# Patient Record
Sex: FEMALE | Race: BLACK OR AFRICAN AMERICAN | Employment: FULL TIME | URBAN - METROPOLITAN AREA
[De-identification: names, ages, dates, MRNs, and addresses within clinical notes are randomized per-mention and may not be internally consistent; named-entity substitution may affect disease eponyms.]

---

## 2022-04-13 ENCOUNTER — HOSPITAL ENCOUNTER (EMERGENCY)
Age: 67
Discharge: HOME | End: 2022-04-13
Attending: STUDENT IN AN ORGANIZED HEALTH CARE EDUCATION/TRAINING PROGRAM

## 2022-04-13 ENCOUNTER — APPOINTMENT (OUTPATIENT)
Dept: GENERAL RADIOLOGY | Age: 67
End: 2022-04-13
Attending: STUDENT IN AN ORGANIZED HEALTH CARE EDUCATION/TRAINING PROGRAM

## 2022-04-13 VITALS
TEMPERATURE: 98.1 F | HEART RATE: 73 BPM | DIASTOLIC BLOOD PRESSURE: 77 MMHG | OXYGEN SATURATION: 99 % | BODY MASS INDEX: 29.02 KG/M2 | RESPIRATION RATE: 27 BRPM | WEIGHT: 170 LBS | HEIGHT: 64 IN | SYSTOLIC BLOOD PRESSURE: 178 MMHG

## 2022-04-13 DIAGNOSIS — R53.83 FATIGUE, UNSPECIFIED TYPE: Primary | ICD-10-CM

## 2022-04-13 LAB
ALBUMIN SERPL-MCNC: 3.2 G/DL (ref 3.2–4.6)
ALBUMIN/GLOB SERPL: 1 {RATIO} (ref 1.2–3.5)
ALP SERPL-CCNC: 108 U/L (ref 50–136)
ALT SERPL-CCNC: 32 U/L (ref 12–65)
ANION GAP SERPL CALC-SCNC: 4 MMOL/L (ref 7–16)
APPEARANCE UR: ABNORMAL
AST SERPL-CCNC: 16 U/L (ref 15–37)
BASOPHILS # BLD: 0.1 K/UL (ref 0–0.2)
BASOPHILS NFR BLD: 1 % (ref 0–2)
BILIRUB SERPL-MCNC: 0.2 MG/DL (ref 0.2–1.1)
BILIRUB UR QL: NEGATIVE
BUN SERPL-MCNC: 17 MG/DL (ref 8–23)
CALCIUM SERPL-MCNC: 8.1 MG/DL (ref 8.3–10.4)
CHLORIDE SERPL-SCNC: 105 MMOL/L (ref 98–107)
CO2 SERPL-SCNC: 30 MMOL/L (ref 21–32)
COLOR UR: YELLOW
CREAT SERPL-MCNC: 0.99 MG/DL (ref 0.6–1)
DIFFERENTIAL METHOD BLD: ABNORMAL
EOSINOPHIL # BLD: 0.1 K/UL (ref 0–0.8)
EOSINOPHIL NFR BLD: 1 % (ref 0.5–7.8)
ERYTHROCYTE [DISTWIDTH] IN BLOOD BY AUTOMATED COUNT: 14.5 % (ref 11.9–14.6)
GLOBULIN SER CALC-MCNC: 3.2 G/DL (ref 2.3–3.5)
GLUCOSE SERPL-MCNC: 188 MG/DL (ref 65–100)
GLUCOSE UR STRIP.AUTO-MCNC: NEGATIVE MG/DL
HCT VFR BLD AUTO: 38.5 % (ref 35.8–46.3)
HGB BLD-MCNC: 12.4 G/DL (ref 11.7–15.4)
HGB UR QL STRIP: NEGATIVE
IMM GRANULOCYTES # BLD AUTO: 0.2 K/UL (ref 0–0.5)
IMM GRANULOCYTES NFR BLD AUTO: 1 % (ref 0–5)
KETONES UR QL STRIP.AUTO: ABNORMAL MG/DL
LEUKOCYTE ESTERASE UR QL STRIP.AUTO: NEGATIVE
LYMPHOCYTES # BLD: 3.9 K/UL (ref 0.5–4.6)
LYMPHOCYTES NFR BLD: 34 % (ref 13–44)
MAGNESIUM SERPL-MCNC: 1.9 MG/DL (ref 1.8–2.4)
MCH RBC QN AUTO: 30.7 PG (ref 26.1–32.9)
MCHC RBC AUTO-ENTMCNC: 32.2 G/DL (ref 31.4–35)
MCV RBC AUTO: 95.3 FL (ref 79.6–97.8)
MONOCYTES # BLD: 0.8 K/UL (ref 0.1–1.3)
MONOCYTES NFR BLD: 7 % (ref 4–12)
NEUTS SEG # BLD: 6.5 K/UL (ref 1.7–8.2)
NEUTS SEG NFR BLD: 56 % (ref 43–78)
NITRITE UR QL STRIP.AUTO: NEGATIVE
NRBC # BLD: 0 K/UL (ref 0–0.2)
PH UR STRIP: 5.5 [PH] (ref 5–9)
PLATELET # BLD AUTO: 247 K/UL (ref 150–450)
PMV BLD AUTO: 9.9 FL (ref 9.4–12.3)
POTASSIUM SERPL-SCNC: 3.8 MMOL/L (ref 3.5–5.1)
PROT SERPL-MCNC: 6.4 G/DL (ref 6.3–8.2)
PROT UR STRIP-MCNC: NEGATIVE MG/DL
RBC # BLD AUTO: 4.04 M/UL (ref 4.05–5.2)
SODIUM SERPL-SCNC: 139 MMOL/L (ref 136–145)
SP GR UR REFRACTOMETRY: 1.03 (ref 1–1.02)
TSH SERPL DL<=0.005 MIU/L-ACNC: 2.11 UIU/ML
UROBILINOGEN UR QL STRIP.AUTO: 0.2 EU/DL (ref 0.2–1)
WBC # BLD AUTO: 11.5 K/UL (ref 4.3–11.1)

## 2022-04-13 PROCEDURE — 81003 URINALYSIS AUTO W/O SCOPE: CPT

## 2022-04-13 PROCEDURE — 93005 ELECTROCARDIOGRAM TRACING: CPT | Performed by: STUDENT IN AN ORGANIZED HEALTH CARE EDUCATION/TRAINING PROGRAM

## 2022-04-13 PROCEDURE — 80053 COMPREHEN METABOLIC PANEL: CPT

## 2022-04-13 PROCEDURE — 99285 EMERGENCY DEPT VISIT HI MDM: CPT

## 2022-04-13 PROCEDURE — 74011250636 HC RX REV CODE- 250/636: Performed by: STUDENT IN AN ORGANIZED HEALTH CARE EDUCATION/TRAINING PROGRAM

## 2022-04-13 PROCEDURE — 83735 ASSAY OF MAGNESIUM: CPT

## 2022-04-13 PROCEDURE — 84443 ASSAY THYROID STIM HORMONE: CPT

## 2022-04-13 PROCEDURE — 85025 COMPLETE CBC W/AUTO DIFF WBC: CPT

## 2022-04-13 PROCEDURE — 71046 X-RAY EXAM CHEST 2 VIEWS: CPT

## 2022-04-13 RX ORDER — SODIUM CHLORIDE 0.9 % (FLUSH) 0.9 %
5-10 SYRINGE (ML) INJECTION AS NEEDED
Status: DISCONTINUED | OUTPATIENT
Start: 2022-04-13 | End: 2022-04-14 | Stop reason: HOSPADM

## 2022-04-13 RX ORDER — HYDROXYZINE PAMOATE 25 MG/1
25 CAPSULE ORAL
Qty: 20 CAPSULE | Refills: 0 | Status: SHIPPED | OUTPATIENT
Start: 2022-04-13

## 2022-04-13 RX ORDER — MELATONIN 3 MG
3 CAPSULE ORAL
Qty: 20 CAPSULE | Refills: 0 | Status: SHIPPED | OUTPATIENT
Start: 2022-04-13

## 2022-04-13 RX ORDER — SODIUM CHLORIDE 0.9 % (FLUSH) 0.9 %
5-10 SYRINGE (ML) INJECTION EVERY 8 HOURS
Status: DISCONTINUED | OUTPATIENT
Start: 2022-04-13 | End: 2022-04-14 | Stop reason: HOSPADM

## 2022-04-13 RX ADMIN — SODIUM CHLORIDE 1000 ML: 900 INJECTION, SOLUTION INTRAVENOUS at 21:44

## 2022-04-14 LAB
ATRIAL RATE: 58 BPM
CALCULATED P AXIS, ECG09: 76 DEGREES
CALCULATED R AXIS, ECG10: 61 DEGREES
CALCULATED T AXIS, ECG11: 115 DEGREES
DIAGNOSIS, 93000: NORMAL
P-R INTERVAL, ECG05: 134 MS
Q-T INTERVAL, ECG07: 438 MS
QRS DURATION, ECG06: 82 MS
QTC CALCULATION (BEZET), ECG08: 429 MS
VENTRICULAR RATE, ECG03: 58 BPM

## 2022-04-14 NOTE — DISCHARGE INSTRUCTIONS
Your work-up tonight has been stable. Take the medication prescribed as directed. Avoid caffeinated drinks after early afternoon to prevent disruption of sleep schedule. Go to sleep at the same time every night, awake at similar times each morning.   Arrange follow-up with your primary care provider for recheck and return to this department for worsening symptoms, concerns or questions

## 2022-04-14 NOTE — ED PROVIDER NOTES
62-year-old female patient presenting to this department with reports of worsening fatigue. Patient states this has been progressive over the past 2 weeks. She states she is up frequently throughout the night to urinate. History of overactive bladder, denies dysuria, hematuria or associated fever or chills. Patient states she often is exhausted by 730, goes to bed shortly thereafter and wakes around 10, at 1 AM and then 3 AM and finally 5 to go to work. Patient states she feels fatigued in the morning when waking. She denies any sudden changes in health but does endorse stress related to her job. Patient reports history of COPD and tobacco use. She denies previous sleep study. Voice concern for dehydration as her mouth has been dry but denies any changes in fluid intake           History reviewed. No pertinent past medical history. History reviewed. No pertinent surgical history. History reviewed. No pertinent family history. Social History     Socioeconomic History    Marital status:      Spouse name: Not on file    Number of children: Not on file    Years of education: Not on file    Highest education level: Not on file   Occupational History    Not on file   Tobacco Use    Smoking status: Never Smoker    Smokeless tobacco: Never Used   Substance and Sexual Activity    Alcohol use: Not on file    Drug use: Not on file    Sexual activity: Not on file   Other Topics Concern    Not on file   Social History Narrative    Not on file     Social Determinants of Health     Financial Resource Strain:     Difficulty of Paying Living Expenses: Not on file   Food Insecurity:     Worried About Running Out of Food in the Last Year: Not on file    [place] of Food in the Last Year: Not on file   Transportation Needs:     Lack of Transportation (Medical): Not on file    Lack of Transportation (Non-Medical):  Not on file   Physical Activity:     Days of Exercise per Week: Not on file    Minutes of Exercise per Session: Not on file   Stress:     Feeling of Stress : Not on file   Social Connections:     Frequency of Communication with Friends and Family: Not on file    Frequency of Social Gatherings with Friends and Family: Not on file    Attends [employer] Services: Not on file    Active Member of Clubs or Organizations: Not on file    Attends Club or Organization Meetings: Not on file    Marital Status: Not on file   Intimate Partner Violence:     Fear of Current or Ex-Partner: Not on file    Emotionally Abused: Not on file    Physically Abused: Not on file    Sexually Abused: Not on file   Housing Stability:     Unable to Pay for Housing in the Last Year: Not on file    Number of [place] in the Last Year: Not on file    Unstable Housing in the Last Year: Not on file         ALLERGIES: Patient has no known allergies. Review of Systems   Constitutional: Positive for fatigue. Negative for chills, diaphoresis and fever. HENT: Negative for congestion, sneezing and sore throat. Eyes: Negative for visual disturbance. Respiratory: Negative for cough, chest tightness, shortness of breath and wheezing. Cardiovascular: Negative for chest pain and leg swelling. Gastrointestinal: Negative for abdominal pain, blood in stool, diarrhea, nausea and vomiting. Endocrine: Negative for polyuria. Genitourinary: Negative for difficulty urinating, dysuria, flank pain, hematuria and urgency. Musculoskeletal: Negative for back pain, myalgias, neck pain and neck stiffness. Skin: Negative for color change and rash. Neurological: Negative for dizziness, syncope, speech difficulty, weakness, light-headedness, numbness and headaches. Psychiatric/Behavioral: Positive for sleep disturbance. Negative for behavioral problems. All other systems reviewed and are negative.       Vitals:    04/13/22 1814   BP: (!) 163/73   Pulse: 68   Resp: 16   Temp: 98.1 °F (36.7 °C)   SpO2: 98%   Weight: 77.1 kg (170 lb)   Height: 5' 4\" (1.626 m)            Physical Exam  Vitals and nursing note reviewed. Constitutional:       General: She is not in acute distress. Appearance: She is well-developed. She is not diaphoretic. Comments: Alert and oriented to person place and time. No acute distress, speaks in clear, fluid sentences. Very well-appearing female patient,   HENT:      Head: Normocephalic and atraumatic. Right Ear: External ear normal.      Left Ear: External ear normal.      Nose: Nose normal.   Eyes:      Pupils: Pupils are equal, round, and reactive to light. Cardiovascular:      Rate and Rhythm: Normal rate and regular rhythm. Heart sounds: Normal heart sounds. No murmur heard. No friction rub. No gallop. Pulmonary:      Effort: Pulmonary effort is normal. No respiratory distress. Breath sounds: Normal breath sounds. No stridor. No decreased breath sounds, wheezing, rhonchi or rales. Chest:      Chest wall: No tenderness. Abdominal:      General: There is no distension. Palpations: Abdomen is soft. There is no mass. Tenderness: There is no abdominal tenderness. There is no guarding or rebound. Hernia: No hernia is present. Musculoskeletal:         General: No tenderness or deformity. Normal range of motion. Cervical back: Normal range of motion. Skin:     General: Skin is warm and dry. Neurological:      Mental Status: She is alert and oriented to person, place, and time. Cranial Nerves: No cranial nerve deficit. MDM  Number of Diagnoses or Management Options  Fatigue, unspecified type: established and worsening  Diagnosis management comments: Labs and x-ray including TSH are stable.   Awaiting urinalysis    Anticipate discharge with outpatient supportive care and primary care referral       Amount and/or Complexity of Data Reviewed  Clinical lab tests: ordered and reviewed  Tests in the radiology section of CPT®: ordered and reviewed  Tests in the medicine section of CPT®: ordered and reviewed  Independent visualization of images, tracings, or specimens: yes    Risk of Complications, Morbidity, and/or Mortality  Presenting problems: moderate  Diagnostic procedures: low  Management options: moderate    Patient Progress  Patient progress: stable         Procedures

## 2022-04-14 NOTE — ED NOTES
I have reviewed discharge instructions with the patient. The patient verbalized understanding. Patient left ED via Discharge Method: ambulatory to Home with self    Opportunity for questions and clarification provided. No acute distress present      Patient given 2 scripts. To continue your aftercare when you leave the hospital, you may receive an automated call from our care team to check in on how you are doing. This is a free service and part of our promise to provide the best care and service to meet your aftercare needs.  If you have questions, or wish to unsubscribe from this service please call [phone]. Thank you for Choosing our [facility].

## 2023-02-21 ENCOUNTER — APPOINTMENT (OUTPATIENT)
Dept: CT IMAGING | Age: 68
End: 2023-02-21
Payer: MEDICARE

## 2023-02-21 ENCOUNTER — APPOINTMENT (OUTPATIENT)
Dept: GENERAL RADIOLOGY | Age: 68
End: 2023-02-21
Payer: MEDICARE

## 2023-02-21 ENCOUNTER — APPOINTMENT (OUTPATIENT)
Dept: ULTRASOUND IMAGING | Age: 68
End: 2023-02-21
Payer: MEDICARE

## 2023-02-21 ENCOUNTER — HOSPITAL ENCOUNTER (EMERGENCY)
Age: 68
Discharge: HOME | End: 2023-02-21
Attending: EMERGENCY MEDICINE
Payer: MEDICARE

## 2023-02-21 VITALS
HEART RATE: 72 BPM | SYSTOLIC BLOOD PRESSURE: 189 MMHG | TEMPERATURE: 98.9 F | HEIGHT: 64 IN | WEIGHT: 165 LBS | RESPIRATION RATE: 18 BRPM | DIASTOLIC BLOOD PRESSURE: 90 MMHG | BODY MASS INDEX: 28.17 KG/M2 | OXYGEN SATURATION: 99 %

## 2023-02-21 DIAGNOSIS — N39.0 URINARY TRACT INFECTION WITHOUT HEMATURIA, SITE UNSPECIFIED: ICD-10-CM

## 2023-02-21 DIAGNOSIS — R93.89 ENDOMETRIAL THICKENING ON ULTRASOUND: Primary | ICD-10-CM

## 2023-02-21 DIAGNOSIS — R10.9 ABDOMINAL PAIN, UNSPECIFIED ABDOMINAL LOCATION: ICD-10-CM

## 2023-02-21 DIAGNOSIS — M25.512 ACUTE PAIN OF LEFT SHOULDER: ICD-10-CM

## 2023-02-21 LAB
ALBUMIN SERPL-MCNC: 4 G/DL (ref 3.2–4.6)
ALBUMIN/GLOB SERPL: 1.3 (ref 0.4–1.6)
ALP SERPL-CCNC: 86 U/L (ref 50–130)
ALT SERPL-CCNC: 27 U/L (ref 12–65)
ANION GAP SERPL CALC-SCNC: 5 MMOL/L (ref 2–11)
APPEARANCE UR: ABNORMAL
AST SERPL-CCNC: 21 U/L (ref 15–37)
BACTERIA URNS QL MICRO: ABNORMAL /HPF
BILIRUB SERPL-MCNC: 0.4 MG/DL (ref 0.2–1.1)
BILIRUB UR QL: NEGATIVE
BUN SERPL-MCNC: 19 MG/DL (ref 8–23)
CALCIUM SERPL-MCNC: 9 MG/DL (ref 8.3–10.4)
CASTS URNS QL MICRO: ABNORMAL /LPF
CHLORIDE SERPL-SCNC: 108 MMOL/L (ref 101–110)
CO2 SERPL-SCNC: 29 MMOL/L (ref 21–32)
COLOR UR: ABNORMAL
CREAT SERPL-MCNC: 0.95 MG/DL (ref 0.6–1)
EPI CELLS #/AREA URNS HPF: ABNORMAL /HPF
ERYTHROCYTE [DISTWIDTH] IN BLOOD BY AUTOMATED COUNT: 14 % (ref 11.9–14.6)
GLOBULIN SER CALC-MCNC: 3.2 G/DL (ref 2.8–4.5)
GLUCOSE SERPL-MCNC: 101 MG/DL (ref 65–100)
GLUCOSE UR STRIP.AUTO-MCNC: NEGATIVE MG/DL
HCT VFR BLD AUTO: 40.1 % (ref 35.8–46.3)
HGB BLD-MCNC: 13.2 G/DL (ref 11.7–15.4)
HGB UR QL STRIP: NEGATIVE
KETONES UR QL STRIP.AUTO: ABNORMAL MG/DL
LEUKOCYTE ESTERASE UR QL STRIP.AUTO: NEGATIVE
LIPASE SERPL-CCNC: 64 U/L (ref 73–393)
MAGNESIUM SERPL-MCNC: 2.2 MG/DL (ref 1.8–2.4)
MCH RBC QN AUTO: 31.1 PG (ref 26.1–32.9)
MCHC RBC AUTO-ENTMCNC: 32.9 G/DL (ref 31.4–35)
MCV RBC AUTO: 94.4 FL (ref 82–102)
MUCOUS THREADS URNS QL MICRO: ABNORMAL /LPF
NITRITE UR QL STRIP.AUTO: NEGATIVE
NRBC # BLD: 0 K/UL (ref 0–0.2)
OTHER OBSERVATIONS: ABNORMAL
PH UR STRIP: 5 (ref 5–9)
PLATELET # BLD AUTO: 274 K/UL (ref 150–450)
PMV BLD AUTO: 9.6 FL (ref 9.4–12.3)
POTASSIUM SERPL-SCNC: 3.5 MMOL/L (ref 3.5–5.1)
PROT SERPL-MCNC: 7.2 G/DL (ref 6.3–8.2)
PROT UR STRIP-MCNC: ABNORMAL MG/DL
RBC # BLD AUTO: 4.25 M/UL (ref 4.05–5.2)
RBC #/AREA URNS HPF: ABNORMAL /HPF
SODIUM SERPL-SCNC: 142 MMOL/L (ref 133–143)
SP GR UR REFRACTOMETRY: 1.03 (ref 1–1.02)
TROPONIN I SERPL HS-MCNC: 40.6 PG/ML (ref 0–14)
TROPONIN I SERPL HS-MCNC: 42.8 PG/ML (ref 0–14)
UROBILINOGEN UR QL STRIP.AUTO: 1 EU/DL (ref 0.2–1)
WBC # BLD AUTO: 9.1 K/UL (ref 4.3–11.1)
WBC URNS QL MICRO: ABNORMAL /HPF

## 2023-02-21 PROCEDURE — 99285 EMERGENCY DEPT VISIT HI MDM: CPT

## 2023-02-21 PROCEDURE — 83690 ASSAY OF LIPASE: CPT

## 2023-02-21 PROCEDURE — 94762 N-INVAS EAR/PLS OXIMTRY CONT: CPT

## 2023-02-21 PROCEDURE — A4216 STERILE WATER/SALINE, 10 ML: HCPCS | Performed by: PHYSICIAN ASSISTANT

## 2023-02-21 PROCEDURE — 85027 COMPLETE CBC AUTOMATED: CPT

## 2023-02-21 PROCEDURE — 83735 ASSAY OF MAGNESIUM: CPT

## 2023-02-21 PROCEDURE — 80053 COMPREHEN METABOLIC PANEL: CPT

## 2023-02-21 PROCEDURE — 2580000003 HC RX 258: Performed by: PHYSICIAN ASSISTANT

## 2023-02-21 PROCEDURE — 96365 THER/PROPH/DIAG IV INF INIT: CPT

## 2023-02-21 PROCEDURE — 93005 ELECTROCARDIOGRAM TRACING: CPT | Performed by: PHYSICIAN ASSISTANT

## 2023-02-21 PROCEDURE — 2500000003 HC RX 250 WO HCPCS: Performed by: PHYSICIAN ASSISTANT

## 2023-02-21 PROCEDURE — 73030 X-RAY EXAM OF SHOULDER: CPT

## 2023-02-21 PROCEDURE — 71046 X-RAY EXAM CHEST 2 VIEWS: CPT

## 2023-02-21 PROCEDURE — 76856 US EXAM PELVIC COMPLETE: CPT

## 2023-02-21 PROCEDURE — 6360000002 HC RX W HCPCS: Performed by: PHYSICIAN ASSISTANT

## 2023-02-21 PROCEDURE — 74177 CT ABD & PELVIS W/CONTRAST: CPT

## 2023-02-21 PROCEDURE — 96375 TX/PRO/DX INJ NEW DRUG ADDON: CPT

## 2023-02-21 PROCEDURE — 72040 X-RAY EXAM NECK SPINE 2-3 VW: CPT

## 2023-02-21 PROCEDURE — 6360000004 HC RX CONTRAST MEDICATION: Performed by: PHYSICIAN ASSISTANT

## 2023-02-21 PROCEDURE — 87086 URINE CULTURE/COLONY COUNT: CPT

## 2023-02-21 PROCEDURE — 81001 URINALYSIS AUTO W/SCOPE: CPT

## 2023-02-21 PROCEDURE — 84484 ASSAY OF TROPONIN QUANT: CPT

## 2023-02-21 RX ORDER — MELOXICAM 7.5 MG/1
7.5 TABLET ORAL DAILY
Qty: 30 TABLET | Refills: 0 | Status: SHIPPED | OUTPATIENT
Start: 2023-02-21

## 2023-02-21 RX ORDER — ONDANSETRON 2 MG/ML
4 INJECTION INTRAMUSCULAR; INTRAVENOUS
Status: COMPLETED | OUTPATIENT
Start: 2023-02-21 | End: 2023-02-21

## 2023-02-21 RX ORDER — METHYLPREDNISOLONE SODIUM SUCCINATE 125 MG/2ML
125 INJECTION, POWDER, LYOPHILIZED, FOR SOLUTION INTRAMUSCULAR; INTRAVENOUS
Status: COMPLETED | OUTPATIENT
Start: 2023-02-21 | End: 2023-02-21

## 2023-02-21 RX ORDER — SODIUM CHLORIDE 0.9 % (FLUSH) 0.9 %
10 SYRINGE (ML) INJECTION
Status: COMPLETED | OUTPATIENT
Start: 2023-02-21 | End: 2023-02-21

## 2023-02-21 RX ORDER — 0.9 % SODIUM CHLORIDE 0.9 %
100 INTRAVENOUS SOLUTION INTRAVENOUS
Status: COMPLETED | OUTPATIENT
Start: 2023-02-21 | End: 2023-02-21

## 2023-02-21 RX ORDER — METHYLPREDNISOLONE 4 MG/1
4 TABLET ORAL SEE ADMIN INSTRUCTIONS
Qty: 1 KIT | Refills: 0 | Status: SHIPPED | OUTPATIENT
Start: 2023-02-21

## 2023-02-21 RX ORDER — CEFDINIR 300 MG/1
300 CAPSULE ORAL 2 TIMES DAILY
Qty: 20 CAPSULE | Refills: 0 | Status: SHIPPED | OUTPATIENT
Start: 2023-02-21 | End: 2023-03-03

## 2023-02-21 RX ORDER — DIPHENHYDRAMINE HYDROCHLORIDE 50 MG/ML
25 INJECTION INTRAMUSCULAR; INTRAVENOUS
Status: COMPLETED | OUTPATIENT
Start: 2023-02-21 | End: 2023-02-21

## 2023-02-21 RX ORDER — FEXOFENADINE HCL 180 MG/1
180 TABLET ORAL DAILY
Qty: 30 TABLET | Refills: 0 | Status: SHIPPED | OUTPATIENT
Start: 2023-02-21 | End: 2023-03-23

## 2023-02-21 RX ORDER — 0.9 % SODIUM CHLORIDE 0.9 %
1000 INTRAVENOUS SOLUTION INTRAVENOUS
Status: COMPLETED | OUTPATIENT
Start: 2023-02-21 | End: 2023-02-21

## 2023-02-21 RX ADMIN — FAMOTIDINE 20 MG: 10 INJECTION, SOLUTION INTRAVENOUS at 18:12

## 2023-02-21 RX ADMIN — DIPHENHYDRAMINE HYDROCHLORIDE 25 MG: 50 INJECTION, SOLUTION INTRAMUSCULAR; INTRAVENOUS at 18:11

## 2023-02-21 RX ADMIN — CEFTRIAXONE 1000 MG: 1 INJECTION, POWDER, FOR SOLUTION INTRAMUSCULAR; INTRAVENOUS at 18:20

## 2023-02-21 RX ADMIN — SODIUM CHLORIDE, PRESERVATIVE FREE 10 ML: 5 INJECTION INTRAVENOUS at 17:19

## 2023-02-21 RX ADMIN — ONDANSETRON 4 MG: 2 INJECTION INTRAMUSCULAR; INTRAVENOUS at 18:13

## 2023-02-21 RX ADMIN — SODIUM CHLORIDE 100 ML: 9 INJECTION, SOLUTION INTRAVENOUS at 17:19

## 2023-02-21 RX ADMIN — SODIUM CHLORIDE 1000 ML: 9 INJECTION, SOLUTION INTRAVENOUS at 18:11

## 2023-02-21 RX ADMIN — METHYLPREDNISOLONE SODIUM SUCCINATE 125 MG: 125 INJECTION, POWDER, FOR SOLUTION INTRAMUSCULAR; INTRAVENOUS at 18:14

## 2023-02-21 RX ADMIN — IOPAMIDOL 100 ML: 755 INJECTION, SOLUTION INTRAVENOUS at 17:15

## 2023-02-21 ASSESSMENT — LIFESTYLE VARIABLES
HOW MANY STANDARD DRINKS CONTAINING ALCOHOL DO YOU HAVE ON A TYPICAL DAY: PATIENT DOES NOT DRINK
HOW OFTEN DO YOU HAVE A DRINK CONTAINING ALCOHOL: NEVER

## 2023-02-21 ASSESSMENT — PAIN DESCRIPTION - ORIENTATION: ORIENTATION: LEFT

## 2023-02-21 ASSESSMENT — PAIN - FUNCTIONAL ASSESSMENT
PAIN_FUNCTIONAL_ASSESSMENT: 0-10
PAIN_FUNCTIONAL_ASSESSMENT: PREVENTS OR INTERFERES SOME ACTIVE ACTIVITIES AND ADLS

## 2023-02-21 ASSESSMENT — PAIN DESCRIPTION - LOCATION: LOCATION: SHOULDER

## 2023-02-21 ASSESSMENT — PAIN DESCRIPTION - DESCRIPTORS: DESCRIPTORS: ACHING

## 2023-02-21 ASSESSMENT — PAIN SCALES - GENERAL: PAINLEVEL_OUTOF10: 10

## 2023-02-21 ASSESSMENT — PAIN DESCRIPTION - FREQUENCY: FREQUENCY: CONTINUOUS

## 2023-02-21 NOTE — Clinical Note
Lucita Martinez was seen and treated in our emergency department on 2/21/2023. She may return to work on 02/23/2023. If you have any questions or concerns, please don't hesitate to call.       ASHLYN Fontenot

## 2023-02-21 NOTE — ED PROVIDER NOTES
Emergency Department Provider Note                   PCP:                KARINE Jeffries NP               Age: 74 y.o. Sex: female       ICD-10-CM    1. Endometrial thickening on ultrasound  R93.89 [facility] Marah Lima MD, OB/GYN, [place]      2. Abdominal pain, unspecified abdominal location  R10.9 [facility] Marah Lima MD, OB/GYN, [place]      3. Urinary tract infection without hematuria, site unspecified  N39.0       4. Acute pain of left shoulder  M25.512 [address] [place] [address] Dr Dick Reed To Discharge 02/21/2023 09:02:10 PM       Medical Decision Making  6:02 PM patient has some hives proximal to her IV site from receiving the IV contrast.  She has no history of this. There is no shortness of breath or difficulty swallowing. No chest pain. She has some nausea with it. No itching. IV Benadryl, Pepcid, Solu-Medrol and fluids were ordered as well as Zofran for the nausea. IV Rocephin was ordered for her UTI however that will be held until these other medications are given and she is feeling better. She had bacteria in her urine. Pelvic ultrasound was ordered due to the thickening of her endometrium on CT. Patient is resting comfortably in the room. 7:11 PM the hives are completely gone. She is feeling better. IV contrast dye added to her allergy list.  No anaphylaxis. She will be sent home on Medrol dose pack, fexofenadine, Omnicef for her urinary tract infection, meloxicam for her pain and referral to Dr. Rufus Jaramillo who is the next unassigned OB/GYN. Patient was instructed to call them tomorrow to be scheduled as soon as possible. She will need an endometrial biopsy. She expresses understanding. She was referred to orthopedics for her shoulder if this continues to bother her. She should do gentle range of motion exercises.   Patient expresses understanding and is stable for discharge and ambulatory out of the ED without difficulty at this time. I also made a referral to PCP for follow-up. Keep a check on BP, check it once or twice weekly with same blood pressure monitor, write the number down with date and time and take that with you to see a PCP for recheck of BP. Patient presented with acute UTI, abdominal pain, shoulder pain to the ED. History obtained from patient. I reviewed the following tests labs, XR, CT. I did review records from an external source. I did engage in shared decision making with the patient/family. I considered further evaluation but did not do it due to resolved symptoms. There is no social determinant of health affecting care. I did use a clinical guideline to determine care of the patient. I discussed results/disposition with patient. Problems Addressed:  Abdominal pain, unspecified abdominal location: acute illness or injury  Acute pain of left shoulder: acute illness or injury  Endometrial thickening on ultrasound: acute illness or injury  Urinary tract infection without hematuria, site unspecified: acute illness or injury    Amount and/or Complexity of Data Reviewed  Labs: ordered. Radiology: ordered. ECG/medicine tests: ordered. Risk  OTC drugs. Prescription drug management.                                 Orders Placed This Encounter   Procedures    Culture, Urine    XR CHEST (2 VW)    CT ABDOMEN PELVIS W IV CONTRAST Additional Contrast? None    XR CERVICAL SPINE (2-3 VIEWS)    XR SHOULDER LEFT (MIN 2 VIEWS)    US PELVIS COMPLETE    CBC    Comprehensive Metabolic Panel    Troponin    Lipase    Magnesium    Urinalysis w rflx microscopic    [facility] Weinberg MD, OB/GYN, [address]    Cardiac Monitor    Pulse Oximetry    EKG 12 Lead    Saline lock IV        Medications   sodium chloride flush 0.9 % injection 10 mL (10 mLs IntraVENous Given 2/21/23 6705)   iopamidol (ISOVUE-370) 76 % injection 100 mL (100 mLs IntraVENous Given 2/21/23 1715)   0.9 % sodium chloride bolus (0 mLs IntraVENous Stopped 2/21/23 1720)   cefTRIAXone (ROCEPHIN) 1,000 mg in sodium chloride 0.9 % 50 mL IVPB (mini-bag) (0 mg IntraVENous Stopped 2/21/23 1850)   diphenhydrAMINE (BENADRYL) injection 25 mg (25 mg IntraVENous Given 2/21/23 1811)   famotidine (PEPCID) 20 mg in sodium chloride (PF) 0.9 % 10 mL injection (20 mg IntraVENous Given 2/21/23 1812)   methylPREDNISolone sodium (SOLU-MEDROL) injection 125 mg (125 mg IntraVENous Given 2/21/23 1814)   ondansetron (ZOFRAN) injection 4 mg (4 mg IntraVENous Given 2/21/23 1813)   0.9 % sodium chloride bolus (0 mLs IntraVENous Stopped 2/21/23 1911)       There are no discharge medications for this patient. Meryl Peoples is a 74 y.o. female who presents to the Emergency Department with chief complaint of    Chief Complaint   Patient presents with    Shoulder Pain    Constipation      Patient is here with abdominal pain and bloating that radiates up into her chest.  This has been going off and on for \"a while. \" She does not have a primary care physician. She states she has a history of constipation and nothing over-the-counter works. She has no fever. She has not been sick recently. She does have pain in the left shoulder that runs into her left upper arm but no swelling to that arm or weakness. No trouble speaking. No injury to the area. There is no swelling/tingling or weakness to her arms or legs, trouble with urination other than frequency or other new symptoms. She was concerned as she had a foreign body stuck in her vagina from May until January of this year without knowledge of it. This was removed without difficulty by [facility] on January 24. The object was intact. She had no injury from it however she has had symptoms off and on since then and is concerned she may have some repercussions from having the object in her vagina for such a long time.   She did ambulate to the room without difficulty and is well hydrated. The history is provided by the patient. Chest Pain  Pain location:  Epigastric  Pain quality: dull    Pain quality: not aching, not burning, not crushing, not hot, no pressure, not radiating, not sharp, not shooting, not stabbing, not tearing, not throbbing and no tightness    Pain radiates to:  Does not radiate  Pain severity:  Mild  Duration:  10 months  Timing:  Intermittent  Progression:  Waxing and waning  Context: not breathing, not drug use, not eating, not intercourse, not lifting, not movement, not raising an arm, not at rest, not stress and not trauma    Relieved by:  Nothing  Worsened by:  Nothing  Ineffective treatments:  None tried  Associated symptoms: abdominal pain and nausea    Associated symptoms: no AICD problem, no altered mental status, no anorexia, no anxiety, no back pain, no claudication, no cough, no diaphoresis, no dizziness, no dysphagia, no fatigue, no fever, no headache, no heartburn, no lower extremity edema, no near-syncope, no numbness, no orthopnea, no palpitations, no PND, no shortness of breath, no syncope, no vomiting and no weakness    Abdominal pain:     Location:  Generalized    Quality: cramping      Duration:  10 months    Timing:  Constant     Review of Systems   Constitutional: Negative. Negative for diaphoresis, fatigue and fever. HENT: Negative. Negative for trouble swallowing. Eyes: Negative. Respiratory: Negative. Negative for cough and shortness of breath. Cardiovascular:  Positive for chest pain. Negative for palpitations, orthopnea, claudication, syncope, PND and near-syncope. Gastrointestinal:  Positive for abdominal pain and nausea. Negative for anorexia, heartburn and vomiting. Endocrine: Negative. Genitourinary: Negative. Musculoskeletal: Negative. Negative for back pain. Left shoulder pain     Skin: Negative. Allergic/Immunologic: Negative. Neurological: Negative.   Negative for dizziness, weakness, numbness and headaches. Hematological: Negative. Psychiatric/Behavioral: Negative. All other systems reviewed and are negative. No past medical history on file. No past surgical history on file. No family history on file. Social History     Socioeconomic History    Marital status:    Tobacco Use    Smoking status: Never    Smokeless tobacco: Never        Allergies: Contrast [iodides]    There are no discharge medications for this patient. Vitals signs and nursing note reviewed. Patient Vitals for the past 4 hrs:   Pulse Resp BP SpO2   02/21/23 2121 72 18 -- 99 %   02/21/23 2114 62 12 (!) 189/90 99 %          Physical Exam  Vitals and nursing note reviewed. Constitutional:       Appearance: Normal appearance. HENT:      Head: Normocephalic and atraumatic. Right Ear: External ear normal.      Left Ear: External ear normal.      Nose: Nose normal.      Mouth/Throat:      Mouth: Mucous membranes are moist.   Eyes:      Extraocular Movements: Extraocular movements intact. Conjunctiva/sclera: Conjunctivae normal.      Pupils: Pupils are equal, round, and reactive to light. Cardiovascular:      Rate and Rhythm: Normal rate and regular rhythm. Pulses: Normal pulses. Heart sounds: Normal heart sounds. Pulmonary:      Effort: Pulmonary effort is normal.      Breath sounds: Normal breath sounds. Abdominal:      General: Abdomen is flat. Palpations: Abdomen is soft. Musculoskeletal:         General: Normal range of motion. Arms:       Cervical back: Normal range of motion and neck supple. Comments: Area of pain, there is full range of motion of the left shoulder and she is distally neurovascularly intact. Mild tenderness to palpation over the paracervical muscles on the left, palpation of that area reproduces her pain. Skin:     General: Skin is warm. Capillary Refill: Capillary refill takes less than 2 seconds. Neurological:      General: No focal deficit present. Mental Status: She is alert and oriented to person, place, and time. Mental status is at baseline. GCS: GCS eye subscore is 4. GCS verbal subscore is 5. GCS motor subscore is 6. Cranial Nerves: Cranial nerves 2-12 are intact. Sensory: Sensation is intact. Motor: Motor function is intact. Coordination: Coordination is intact. Gait: Gait is intact. Psychiatric:         Mood and Affect: Mood normal.         Behavior: Behavior normal.         Thought Content: Thought content normal.         Judgment: Judgment normal.        Procedures    ED EKG Interpretation  EKG was interpreted in the absence of a cardiologist.    Rate: 61 bpm.  EKG Interpretation: EKG Interpretation: sinus rhythm  ST Segments: Nonspecific ST segments - NO STEMI    Is this patient to be included in the SEP-1 core measure due to severe sepsis or septic shock? No Exclusion criteria - the patient is NOT to be included for SEP-1 Core Measure due to: Infection is not suspected     Results for orders placed or performed during the hospital encounter of 02/21/23   XR CHEST (2 VW)    Narrative    EXAMINATION: XR CHEST (2 VW) 2/21/2023 4:46 PM    ACCESSION NUMBER: [id number]    COMPARISON: None available    INDICATION: Chest Pain    TECHNIQUE: PA and lateral views of the chest were obtained. FINDINGS:   Support Devices:   *  None    Cardiac Silhouette: Within normal limits in size. Mediastinum: Normal mediastinal contours. Aortic arch calcifications. Lungs: No airspace consolidation. No pneumothorax or sizable pleural effusion. Upper Abdomen: Normal     Miscellaneous: No fracture or suspicious osseous lesion. Impression    No acute cardiopulmonary abnormality.      CT ABDOMEN PELVIS W IV CONTRAST Additional Contrast? None    Narrative    EXAMINATION: CT SCAN OF THE ABDOMEN AND PELVIS WITH INTRAVENOUS CONTRAST    DATE OF EXAM: 2/21/2023 4:30 PM    HISTORY: Abdominal pain and bloating  patient states she recently had a metal   object removed from the vagina. Patient is concerned about infection. COMPARISON: None. TECHNIQUE: CT examination of the abdomen and pelvis was performed following the   intravenous administration of iodinated contrast. 100 mL Isovue-370 CT dose   lowering techniques were used, to include: automated exposure control,   adjustment for patient size, and/or use of iterative reconstruction. FINDINGS:    ABDOMEN/PELVIS:    Lower Chest: Normal.     Liver: Normal.     Gallbladder/Biliary: Normal.     Pancreas: Normal.     Spleen: Normal.     Adrenal Glands: Normal.     Kidneys: Normal.     GI Tract: Stomach is nondistended. Small bowel is nondilated. Appendix is   visualized and normal. Large bowel shows no wall thickening and no significant   constipation. Mesentery/Peritoneum: Normal.    Vasculature: Significant atherosclerotic plaque abdominal aorta. No aneurysm. Lymph Nodes: Normal.     Abdominal Wall: Normal.     Bladder: Normal.     Reproductive: Limited assessment of the endometrium with CT but on the sagittal   view appears to measure up to 1.1 cm. This is abnormal based on patient age. No   radiopaque foreign body detected. No adnexal mass. Musculoskeletal: Normal.        Impression    1. Endometrial thickening. Recommend pelvis ultrasound for further assessment. 2.  No radiopaque foreign body. Thank you for the referral of this patient. This exam was interpreted by an   American Board of Radiology certified radiologist with subspecialty fellowship   in [place] [address]. If there are questions about this or other reports you can   contact a radiologist directly at [phone]   You can also reach me directly   at [email or web address]         Sharifa Hernandez M.D., [facility]   2/21/2023 5:43:00 PM   XR CERVICAL SPINE (2-3 VIEWS)    Narrative    EXAMINATION: XR CERVICAL SPINE (2-3 VIEWS) 2/21/2023 4:46 PM    ACCESSION NUMBER: [id number]    COMPARISON: None available    INDICATION: Neck and shoulder/arm pain    TECHNIQUE: 3 views of the cervical spine were obtained. FINDINGS:  Vertebral body height is within normal limits. Cervical spine alignment is  normal. No acute fracture. Mild to moderate multilevel degenerative changes involving the disc spaces and  facet joints. Arterial calcifications within the midneck. Impression    1. No acute abnormality. 2.  Mild to moderate multilevel degenerative changes. .   XR SHOULDER LEFT (MIN 2 VIEWS)    Narrative    EXAMINATION: XR SHOULDER LEFT (MIN 2 VIEWS) 2/21/2023 4:46 PM    ACCESSION NUMBER: [id number]    COMPARISON: None available    INDICATION: Neck and shoulder/arm pain    TECHNIQUE: 3 views of the left shoulder were obtained. FINDINGS:  No acute fracture or dislocation. Mild glenohumeral and AC joint arthrosis. Bony  mineralization is preserved. The soft tissues are unremarkable. Impression    1. No acute osseous abnormality. 2.  Mild glenohumeral and AC joint arthrosis. US PELVIS COMPLETE    Narrative    Exam: Pelvic ultrasound    Date: February 21, 2023    History: Endometrial thickening    Comparison: February 21, 2023    Technique: Real-time Duplex scan was performed using B-mode/grayscale imaging,   color flow and spectral Doppler analysis to include evaluation of arterial and   venous flow. Transabdominal and transvaginal technique was utilized. Findings: The uterus measures 7.2 x 3.7 x 3.1 cm. The endometrium is abnormally thickened   in a patient of this age measuring 1.5 cm. There is a probable 1.8 cm fibroid in   the anterior myometrium. The ovaries are not well-visualized secondary to overlying bowel gas. Impression    1. Abnormally thickened endometrium measuring 1.5 cm. OB/GYN consultation is   strongly recommended for follow-up. 2. Approximately 1.8 cm fibroid in the anterior myometrium.   3. The study is significantly degraded by overlying bowel gas.       Slot 63             Elo Landry M.D.   2/21/2023 8:42:00 PM   CBC   Result Value Ref Range    WBC 9.1 4.3 - 11.1 K/uL    RBC 4.25 4.05 - 5.2 M/uL    Hemoglobin 13.2 11.7 - 15.4 g/dL    Hematocrit 40.1 35.8 - 46.3 %    MCV 94.4 82.0 - 102.0 FL    MCH 31.1 26.1 - 32.9 PG    MCHC 32.9 31.4 - 35.0 g/dL    RDW 14.0 11.9 - 14.6 %    Platelets 295 231 - 710 K/uL    MPV 9.6 9.4 - 12.3 FL    nRBC 0.00 0.0 - 0.2 K/uL   Comprehensive Metabolic Panel   Result Value Ref Range    Sodium 142 133 - 143 mmol/L    Potassium 3.5 3.5 - 5.1 mmol/L    Chloride 108 101 - 110 mmol/L    CO2 29 21 - 32 mmol/L    Anion Gap 5 2 - 11 mmol/L    Glucose 101 (H) 65 - 100 mg/dL    BUN 19 8 - 23 MG/DL    Creatinine 0.95 0.6 - 1.0 MG/DL    Est, Glom Filt Rate >60 >60 ml/min/1.73m2    Calcium 9.0 8.3 - 10.4 MG/DL    Total Bilirubin 0.4 0.2 - 1.1 MG/DL    ALT 27 12 - 65 U/L    AST 21 15 - 37 U/L    Alk Phosphatase 86 50 - 130 U/L    Total Protein 7.2 6.3 - 8.2 g/dL    Albumin 4.0 3.2 - 4.6 g/dL    Globulin 3.2 2.8 - 4.5 g/dL    Albumin/Globulin Ratio 1.3 0.4 - 1.6     Troponin   Result Value Ref Range    Troponin, High Sensitivity 40.6 (H) 0 - 14 pg/mL   Troponin   Result Value Ref Range    Troponin, High Sensitivity 42.8 (H) 0 - 14 pg/mL   Lipase   Result Value Ref Range    Lipase 64 (L) 73 - 393 U/L   Magnesium   Result Value Ref Range    Magnesium 2.2 1.8 - 2.4 mg/dL   Urinalysis w rflx microscopic   Result Value Ref Range    Color, UA DARK YELLOW      Appearance CLOUDY      Specific Gravity, UA 1.034 (H) 1.001 - 1.023      pH, Urine 5.0 5.0 - 9.0      Protein, UA TRACE (A) NEG mg/dL    Glucose, UA Negative mg/dL    Ketones, Urine TRACE (A) NEG mg/dL    Bilirubin Urine Negative NEG      Blood, Urine Negative NEG      Urobilinogen, Urine 1.0 0.2 - 1.0 EU/dL    Nitrite, Urine Negative NEG      Leukocyte Esterase, Urine Negative NEG      WBC, UA 5-10 0 /hpf    RBC, UA 0-3 0 /hpf    Epithelial Cells UA 5-10 0 /hpf    BACTERIA, URINE 4+ (H) 0 /hpf    Casts 0-3 0 /lpf    Mucus, UA 1+ (H) 0 /lpf    Other observations RESULTS VERIFIED MANUALLY     EKG 12 Lead   Result Value Ref Range    Ventricular Rate 61 BPM    Atrial Rate 61 BPM    P-R Interval 140 ms    QRS Duration 89 ms    Q-T Interval 431 ms    QTc Calculation (Bazett) 435 ms    P Axis 64 degrees    R Axis 35 degrees    T Axis 66 degrees    Diagnosis Sinus rhythm         US PELVIS COMPLETE   Final Result   1. Abnormally thickened endometrium measuring 1.5 cm. OB/GYN consultation is    strongly recommended for follow-up. 2. Approximately 1.8 cm fibroid in the anterior myometrium. 3. The study is significantly degraded by overlying bowel gas. Zabrina Schroeder M.D.    2/21/2023 8:42:00 PM      CT ABDOMEN PELVIS W IV CONTRAST Additional Contrast? None   Final Result      1. Endometrial thickening. Recommend pelvis ultrasound for further assessment. 2.  No radiopaque foreign body. Thank you for the referral of this patient. This exam was interpreted by an    American Board of Radiology certified radiologist with subspecialty fellowship    in [place] [address]. If there are questions about this or other reports you can    contact a radiologist directly at [phone]   You can also reach me directly    at [email or web address]             Jose Enrique Baires M.D., [facility]    2/21/2023 5:43:00 PM      XR CHEST (2 VW)   Final Result   No acute cardiopulmonary abnormality. XR CERVICAL SPINE (2-3 VIEWS)   Final Result   1. No acute abnormality. 2.  Mild to moderate multilevel degenerative changes. .      XR SHOULDER LEFT (MIN 2 VIEWS)   Final Result   1. No acute osseous abnormality. 2.  Mild glenohumeral and AC joint arthrosis. Voice dictation software was used during the making of this note. This software is not perfect and grammatical and other typographical errors may be present.   This note has not been completely proofread for errors.      Fidel Galavizma  02/22/23 7114

## 2023-02-21 NOTE — ED TRIAGE NOTES
Pt c/o being constipated for the past couple weeks with no relief after taking miralx and drinking prune juice. Stated she had an x ray 01/23/2023 which showed a metal object inside her vagina which they removed, patient is concerned for an infection. Denies any discharge, flank pain or burning on urinating, was not placed on any antibiotics. Admits to frequency.    Also c/o left shoulder pain with numbness radiating down arm, denies any injury to shoulder

## 2023-02-22 LAB
EKG ATRIAL RATE: 61 BPM
EKG DIAGNOSIS: NORMAL
EKG P AXIS: 64 DEGREES
EKG P-R INTERVAL: 140 MS
EKG Q-T INTERVAL: 431 MS
EKG QRS DURATION: 89 MS
EKG QTC CALCULATION (BAZETT): 435 MS
EKG R AXIS: 35 DEGREES
EKG T AXIS: 66 DEGREES
EKG VENTRICULAR RATE: 61 BPM

## 2023-02-22 ASSESSMENT — ENCOUNTER SYMPTOMS
ORTHOPNEA: 0
VOMITING: 0
BACK PAIN: 0
SHORTNESS OF BREATH: 0
EYES NEGATIVE: 1
HEARTBURN: 0
RESPIRATORY NEGATIVE: 1
ABDOMINAL PAIN: 1
TROUBLE SWALLOWING: 0
NAUSEA: 1
ALLERGIC/IMMUNOLOGIC NEGATIVE: 1
COUGH: 0

## 2023-02-22 NOTE — ED NOTES
I have reviewed discharge instructions with the patient. The patient verbalized understanding. Patient left ED via Discharge Method: ambulatory to Home with self. Opportunity for questions and clarification provided. Patient given 4 scripts.         Burgess Aniket RN  02/21/23 [id number]

## 2023-02-22 NOTE — DISCHARGE INSTRUCTIONS
Call the gynecologist for an appointment as soon as possible. You will need an endometrial biopsy. Referral made to primary care and orthopedics. Take the medication as directed. I added IV contrast dye as an allergy on your allergy list.  Never take this again as you did have an allergic reaction to it. I will send you with a steroid dose taper as well as an allergy medicine that you should finish. Return to the ED if you are worsening in any way.

## 2023-02-23 ENCOUNTER — OFFICE VISIT (OUTPATIENT)
Dept: OBGYN CLINIC | Age: 68
End: 2023-02-23
Payer: MEDICARE

## 2023-02-23 VITALS
DIASTOLIC BLOOD PRESSURE: 80 MMHG | SYSTOLIC BLOOD PRESSURE: 140 MMHG | WEIGHT: 163 LBS | BODY MASS INDEX: 28.88 KG/M2 | HEIGHT: 63 IN

## 2023-02-23 DIAGNOSIS — K92.1 BLACK STOOL: ICD-10-CM

## 2023-02-23 DIAGNOSIS — N88.2 CERVICAL STENOSIS (UTERINE CERVIX): ICD-10-CM

## 2023-02-23 DIAGNOSIS — Z91.89 AT HIGH RISK FOR PAIN FROM PROCEDURE: ICD-10-CM

## 2023-02-23 DIAGNOSIS — N89.8 VAGINAL DISCHARGE: ICD-10-CM

## 2023-02-23 DIAGNOSIS — Z12.4 SCREENING FOR CERVICAL CANCER: ICD-10-CM

## 2023-02-23 DIAGNOSIS — R93.89 ENDOMETRIAL THICKENING ON ULTRASOUND: Primary | ICD-10-CM

## 2023-02-23 DIAGNOSIS — N89.8 VAGINAL ODOR: ICD-10-CM

## 2023-02-23 PROCEDURE — 4004F PT TOBACCO SCREEN RCVD TLK: CPT | Performed by: OBSTETRICS & GYNECOLOGY

## 2023-02-23 PROCEDURE — G8417 CALC BMI ABV UP PARAM F/U: HCPCS | Performed by: OBSTETRICS & GYNECOLOGY

## 2023-02-23 PROCEDURE — 3017F COLORECTAL CA SCREEN DOC REV: CPT | Performed by: OBSTETRICS & GYNECOLOGY

## 2023-02-23 PROCEDURE — 1090F PRES/ABSN URINE INCON ASSESS: CPT | Performed by: OBSTETRICS & GYNECOLOGY

## 2023-02-23 PROCEDURE — G8484 FLU IMMUNIZE NO ADMIN: HCPCS | Performed by: OBSTETRICS & GYNECOLOGY

## 2023-02-23 PROCEDURE — G8400 PT W/DXA NO RESULTS DOC: HCPCS | Performed by: OBSTETRICS & GYNECOLOGY

## 2023-02-23 PROCEDURE — G8427 DOCREV CUR MEDS BY ELIG CLIN: HCPCS | Performed by: OBSTETRICS & GYNECOLOGY

## 2023-02-23 PROCEDURE — 99203 OFFICE O/P NEW LOW 30 MIN: CPT | Performed by: OBSTETRICS & GYNECOLOGY

## 2023-02-23 PROCEDURE — 1123F ACP DISCUSS/DSCN MKR DOCD: CPT | Performed by: OBSTETRICS & GYNECOLOGY

## 2023-02-23 RX ORDER — NAPROXEN 500 MG/1
500 TABLET ORAL ONCE
Qty: 1 TABLET | Refills: 0 | Status: SHIPPED | OUTPATIENT
Start: 2023-02-23 | End: 2023-02-23

## 2023-02-23 RX ORDER — HYDROCODONE BITARTRATE AND ACETAMINOPHEN 7.5; 325 MG/1; MG/1
1 TABLET ORAL ONCE
Qty: 1 TABLET | Refills: 0 | Status: SHIPPED | OUTPATIENT
Start: 2023-02-23 | End: 2023-02-23

## 2023-02-23 RX ORDER — MISOPROSTOL 200 UG/1
TABLET ORAL
Qty: 3 TABLET | Refills: 0 | Status: SHIPPED | OUTPATIENT
Start: 2023-02-23

## 2023-02-23 NOTE — PROGRESS NOTES
Ed Clarke  74 y.o.  1955  [id number]    New pt           *    Today:  2/23/23      Chief Complaint   Patient presents with    [facility] FU       Subjective:  Ed Clarke is a 74 y.o. Amenorrhea since menopause ins her 45s  CT incidental finding --> endometrial thickening. No VB    4/2022 SF ER:  fatigue, frequent nocturia  \"Patient states she often is exhausted by 730, goes to bed shortly thereafter and wakes around 10, at 1 AM and then 3 AM and finally 5 to go to work. Patient states she feels fatigued in the morning when waking. Patient reports history of COPD and tobacco use  SF US--> thickened endometrium    2/2023 SF ER:  acute UTI, abd'l pain, shoulder pain          HPI:      CT Result (most recent):  CT ABDOMEN PELVIS W IV CONTRAST 02/21/2023    Narrative  EXAMINATION: CT SCAN OF THE ABDOMEN AND PELVIS WITH INTRAVENOUS CONTRAST    DATE OF EXAM: 2/21/2023 4:30 PM    HISTORY: Abdominal pain and bloating  patient states she recently had a metal  object removed from the vagina. Patient is concerned about infection. COMPARISON: None. TECHNIQUE: CT examination of the abdomen and pelvis was performed following the  intravenous administration of iodinated contrast. 100 mL Isovue-370 CT dose  lowering techniques were used, to include: automated exposure control,  adjustment for patient size, and/or use of iterative reconstruction. FINDINGS:    ABDOMEN/PELVIS:    Lower Chest: Normal.    Liver: Normal.    Gallbladder/Biliary: Normal.    Pancreas: Normal.    Spleen: Normal.    Adrenal Glands: Normal.    Kidneys: Normal.    GI Tract: Stomach is nondistended. Small bowel is nondilated. Appendix is  visualized and normal. Large bowel shows no wall thickening and no significant  constipation. Mesentery/Peritoneum: Normal.    Vasculature: Significant atherosclerotic plaque abdominal aorta. No aneurysm.     Lymph Nodes: Normal.    Abdominal Wall: Normal.    Bladder: Normal.    Reproductive: Limited assessment of the endometrium with CT but on the sagittal  view appears to measure up to 1.1 cm. This is abnormal based on patient age. No  radiopaque foreign body detected. No adnexal mass. Musculoskeletal: Normal.    Impression  1. Endometrial thickening. Recommend pelvis ultrasound for further assessment. 2.  No radiopaque foreign body. Thank you for the referral of this patient. This exam was interpreted by an  American Board of Radiology certified radiologist with subspecialty fellowship  in [place] [address]. If there are questions about this or other reports you can  contact a radiologist directly at [phone]   You can also reach me directly  at [email or web address]        Sunni Carias M.D., [facility]  2/21/2023 5:43:00 PM      US Result (most recent):  US PELVIS COMPLETE 02/21/2023    Narrative  Exam: Pelvic ultrasound    Date: February 21, 2023    History: Endometrial thickening    Comparison: February 21, 2023    Technique: Real-time Duplex scan was performed using B-mode/grayscale imaging,  color flow and spectral Doppler analysis to include evaluation of arterial and  venous flow. Transabdominal and transvaginal technique was utilized. Findings: The uterus measures 7.2 x 3.7 x 3.1 cm. The endometrium is abnormally thickened  in a patient of this age measuring 1.5 cm. There is a probable 1.8 cm fibroid in  the anterior myometrium. The ovaries are not well-visualized secondary to overlying bowel gas. Impression  1. Abnormally thickened endometrium measuring 1.5 cm. OB/GYN consultation is  strongly recommended for follow-up. 2. Approximately 1.8 cm fibroid in the anterior myometrium. 3. The study is significantly degraded by overlying bowel gas. Cindy Dougherty M.D.  2/21/2023 8:42:00 PM         No LMP recorded (lmp unknown). Patient is postmenopausal.      OB History   No obstetric history on file.        Allergies   Allergen Reactions Contrast [Iodides] Hives       Current Outpatient Medications   Medication Sig    miSOPROStol (CYTOTEC) 200 MCG tablet Take 200 mcg p.o. at bedtime starting 3 nights before your follow-up GYN office visit for endometrial biopsy. naproxen (EC-NAPROSYN) 500 MG EC tablet Take 1 tablet by mouth once for 1 dose Take 1-2 hours before endometrial biopsy    meloxicam (MOBIC) 7.5 MG tablet Take 1 tablet by mouth daily As directed for pain with food    methylPREDNISolone (MEDROL, KARY,) 4 MG tablet Take 1 tablet by mouth See Admin Instructions Take by mouth. fexofenadine (ALLEGRA) 180 MG tablet Take 1 tablet by mouth daily    albuterol sulfate HFA (PROVENTIL;VENTOLIN;PROAIR) 108 (90 Base) MCG/ACT inhaler INHALE 2 PUFFS EVERY 4 TO 6 HOURS AS NEEDED    atorvastatin (LIPITOR) 10 MG tablet Take 10 mg by mouth    benzonatate (TESSALON) 100 MG capsule Take 100 mg by mouth 3 times daily as needed    carvedilol (COREG) 12.5 MG tablet TAKE 1 TABLET BY MOUTH TWICE A DAY    lisinopril (PRINIVIL;ZESTRIL) 40 MG tablet   0 Refill(s), Type: Maintenance    magnesium oxide (MAG-OX) 400 MG tablet magnesium oxide 400 mg (241.3 mg magnesium) tablet   Take 1 tablet twice a day by oral route. Melatonin 3 MG CAPS Take 3 mg by mouth    busPIRone (BUSPAR) 10 MG tablet buspirone 10 mg tablet   1 tablet twice day as needed for anxiety    diclofenac (VOLTAREN) 75 MG EC tablet TAKE 1 TAB BY MOUTH TWICE A DAY X14 DAYS AS NEEDED FOR PAIN, SWELLING WITH FOOD. NOT W/ OTHER NSAIDS    FLUoxetine (PROZAC) 20 MG capsule Prozac 20 mg capsule   take 2 pills daily in the morning    zolpidem (AMBIEN) 10 MG tablet zolpidem 10 mg tablet   1/2 to one tablet qhs PRN     No current facility-administered medications for this visit. No past medical history on file.     Past Surgical History:   Procedure Laterality Date     SECTION         Social History     Socioeconomic History    Marital status:    Tobacco Use    Smoking status: Never Smokeless tobacco: Never   Vaping Use    Vaping Use: Never used   Substance and Sexual Activity    Alcohol use: Yes     Comment: occ    Drug use: Never    Sexual activity: Not Currently     Partners: Male       Family History   Problem Relation Age of Onset    Stroke Mother     Pseudotumor Cerebri Child     Hypertension Son         Both sons       Review of Systems   Genitourinary:         Nocturia       Objective: BP (!) 140/80   Ht 5' 3\" (1.6 m)   Wt 163 lb (73.9 kg)   LMP  (LMP Unknown)   BMI 28.87 kg/m²   Cayden Hernandez is a 74 y.o. female who appears pleasant, well developed, well nourished, with good attention to hygiene and body habitus. In no acute distress. Psych:  Oriented to person, place and time. Mood and affect are normal and appropriate to the situation. Short-term memory and understanding intact    Eyes: Sclera are clear. Conjunctiva and lids within normal limits. No icterus. Ears and Nose: no gross deformities to visual inspection, gross hearing intact     Lymph Nodes:    No groin lymphadenopathy noted. Skin: No skin rash, subcutaneous nodules, lesions or ulcers observed  Respiratory:   Breathing is non-labored. Lungs clear to auscultation without wheezing or rhonchi   Cardiovascular: RRR, Heart auscultation reveals no murmurs, rubs or gallops appreciated. Abdomen: Soft. No masses, nontender, no guarding or rebound  External genitalia: normal appearing, minimal erythema, no lesions  Vagina: pink with normal rugations, no lesions  Cervix: normal appearing, no lesions,  no exudate, no CMT.   Pap  Uterus: midline, normal size, shape and contour  Adnexa: bilaterally  no masses, non tender  Rectovaginal exam:  Normal sphincter tone, no masses     A/P:    1.  73 yo incidental finding endometrial thickening--> EMB      Significant comorbidities:        F/u 3m-assess response to tx    Re ROS--> non gynecologic concerns referred back to her PCP or appropriate specialist.            ICD-10-CM 1. Endometrial thickening on ultrasound  R93.89 PAP IG, Aptima HPV and rfx 16/18,45 ([id number])     miSOPROStol (CYTOTEC) 200 MCG tablet     HYDROcodone-acetaminophen (NORCO) 7.5-325 MG per tablet     naproxen (EC-NAPROSYN) 500 MG EC tablet      2. Screening for cervical cancer  Z12.4 PAP IG, Aptima HPV and rfx 16/18,45 ([id number])     PAP IG, Aptima HPV and rfx 16/18,45 ([id number])      3. Vaginal discharge  N89.8 NuSwab Vaginitis Plus (VG+) with Canddia (Six Species)     NuSwab Vaginitis Plus (VG+) with Canddia (Six Species)      4. Vaginal odor  N89.8 NuSwab Vaginitis Plus (VG+) with Canddia (Six Species)     NuSwab Vaginitis Plus (VG+) with Canddia (Six Species)      5. Black stool  K92.1 AMB POC FECAL BLOOD, OCCULT, QL 1 CARD      6. At high risk for pain from procedure  Z91.89 HYDROcodone-acetaminophen (NORCO) 7.5-325 MG per tablet     naproxen (EC-NAPROSYN) 500 MG EC tablet      7. Cervical stenosis (uterine cervix)  N88.2 PAP IG, Aptima HPV and rfx 16/18,45 ([id number])     miSOPROStol (CYTOTEC) 200 MCG tablet           Orders Placed This Encounter   Procedures    NuSwab Vaginitis Plus (VG+) with Canddia (Six Species)    PAP IG, Aptima HPV and rfx 16/18,45 ([id number])    AMB POC FECAL BLOOD, OCCULT, QL 1 CARD       More than 50% of this 30+  minute visit was spent addressing the above patient concerns including:  assessment, extensive chart review, physical exam and counseling the patient about the above concerns including evaluation plan and treatment strategies. Long conversation with patient, all her questions answered and addressed all her concerns.     Lexii Souza MD, [facility]

## 2023-02-24 ENCOUNTER — OFFICE VISIT (OUTPATIENT)
Dept: ORTHOPEDIC SURGERY | Age: 68
End: 2023-02-24
Payer: MEDICARE

## 2023-02-24 VITALS — WEIGHT: 163.8 LBS | BODY MASS INDEX: 27.29 KG/M2 | HEIGHT: 65 IN

## 2023-02-24 DIAGNOSIS — G95.9 CERVICAL MYELOPATHY (HCC): ICD-10-CM

## 2023-02-24 DIAGNOSIS — M48.02 CERVICAL STENOSIS OF SPINAL CANAL: Primary | ICD-10-CM

## 2023-02-24 DIAGNOSIS — R29.898 LEFT ARM WEAKNESS: ICD-10-CM

## 2023-02-24 LAB
BACTERIA SPEC CULT: NORMAL
SERVICE CMNT-IMP: NORMAL

## 2023-02-24 PROCEDURE — 99203 OFFICE O/P NEW LOW 30 MIN: CPT | Performed by: NURSE PRACTITIONER

## 2023-02-24 PROCEDURE — G8417 CALC BMI ABV UP PARAM F/U: HCPCS | Performed by: NURSE PRACTITIONER

## 2023-02-24 PROCEDURE — G8400 PT W/DXA NO RESULTS DOC: HCPCS | Performed by: NURSE PRACTITIONER

## 2023-02-24 PROCEDURE — 3017F COLORECTAL CA SCREEN DOC REV: CPT | Performed by: NURSE PRACTITIONER

## 2023-02-24 PROCEDURE — G8428 CUR MEDS NOT DOCUMENT: HCPCS | Performed by: NURSE PRACTITIONER

## 2023-02-24 PROCEDURE — 4004F PT TOBACCO SCREEN RCVD TLK: CPT | Performed by: NURSE PRACTITIONER

## 2023-02-24 PROCEDURE — 1090F PRES/ABSN URINE INCON ASSESS: CPT | Performed by: NURSE PRACTITIONER

## 2023-02-24 PROCEDURE — G8484 FLU IMMUNIZE NO ADMIN: HCPCS | Performed by: NURSE PRACTITIONER

## 2023-02-24 PROCEDURE — 1123F ACP DISCUSS/DSCN MKR DOCD: CPT | Performed by: NURSE PRACTITIONER

## 2023-02-24 RX ORDER — FLUOXETINE HYDROCHLORIDE 20 MG/1
CAPSULE ORAL
COMMUNITY
Start: 2022-11-10

## 2023-02-24 RX ORDER — MAGNESIUM OXIDE 400 MG/1
TABLET ORAL
COMMUNITY

## 2023-02-24 RX ORDER — ATORVASTATIN CALCIUM 10 MG/1
10 TABLET, FILM COATED ORAL
COMMUNITY
Start: 2019-12-12

## 2023-02-24 RX ORDER — ALBUTEROL SULFATE 90 UG/1
AEROSOL, METERED RESPIRATORY (INHALATION)
COMMUNITY
Start: 2023-02-07

## 2023-02-24 RX ORDER — BUSPIRONE HYDROCHLORIDE 10 MG/1
TABLET ORAL
COMMUNITY
Start: 2022-11-10

## 2023-02-24 RX ORDER — CARVEDILOL 12.5 MG/1
TABLET ORAL
COMMUNITY
Start: 2023-01-18

## 2023-02-24 RX ORDER — DICLOFENAC SODIUM 75 MG/1
TABLET, DELAYED RELEASE ORAL
COMMUNITY
Start: 2023-01-24

## 2023-02-24 RX ORDER — LISINOPRIL 40 MG/1
TABLET ORAL
COMMUNITY
Start: 2019-12-12

## 2023-02-24 RX ORDER — ZOLPIDEM TARTRATE 10 MG/1
TABLET ORAL
COMMUNITY
Start: 2022-11-10

## 2023-02-24 RX ORDER — BENZONATATE 100 MG/1
100 CAPSULE ORAL 3 TIMES DAILY PRN
COMMUNITY
Start: 2018-11-26

## 2023-02-24 NOTE — PROGRESS NOTES
Name: Cyrus Dixon  YOB: 1955  Gender: female  MRN: [id number]    CC: Acute on chronic neck pain, arm pain    History of present illness: This is a very pleasant 74 y.o. old female who presents with recent treatment in the emergency room for cervical spine complaints. As she is a bit of a poor historian but states she is had neck pain for quite some time. She has pain that on the left side of the neck into the shoulder and down in the axilla down the bicep and into her hand sometimes. She has weakness of the left arm. She has difficulty lifting her left shoulder up. She states this got worse after the shingles vaccine. She has had symptoms on the right before. She is done physical therapy years ago. She states she is had 6 prednisone packs in the past 6 months. She tells me that she has had nerve studies in the past.  Upon further investigation into epic, patient was seen by [facility] neurology for questionable seizures and also cervical myelopathy. I do not have any images to review but report of an MRI cervical spine from 2016 revealed severe spinal stenosis from C4-C7. At that time in 2018 nerve conduction studies were normal.  My understanding was she was being treated at the [facility] during that time and maybe that is why surgery was not offered. There have been changes in fine motor skills such as handwriting and buttoning buttons. There have not been changes in gait since the onset of the symptoms. The patient has not had cervical surgery in the past.    Thus far, efforts to address the pain have included gabapentin in the past, steroids, NSAIDs, physical therapy. AMB PAIN ASSESSMENT 2/24/2023   Location of Pain Neck   Location Modifiers Left   Severity of Pain 10   Frequency of Pain Constant   Limiting Behavior Yes   Result of Injury No   Work-Related Injury No   Are there other pain locations you wish to document?  No          ROS/Meds/PSH/PMH/FH/SH: I personally reviewed the patient's collected intake data. Below are the pertinents:    Allergies   Allergen Reactions    Contrast [Iodides] Hives         Current Outpatient Medications:     albuterol sulfate HFA (PROVENTIL;VENTOLIN;PROAIR) 108 (90 Base) MCG/ACT inhaler, INHALE 2 PUFFS EVERY 4 TO 6 HOURS AS NEEDED, Disp: , Rfl:     atorvastatin (LIPITOR) 10 MG tablet, Take 10 mg by mouth, Disp: , Rfl:     benzonatate (TESSALON) 100 MG capsule, Take 100 mg by mouth 3 times daily as needed, Disp: , Rfl:     carvedilol (COREG) 12.5 MG tablet, TAKE 1 TABLET BY MOUTH TWICE A DAY, Disp: , Rfl:     lisinopril (PRINIVIL;ZESTRIL) 40 MG tablet,  0 Refill(s), Type: Maintenance, Disp: , Rfl:     magnesium oxide (MAG-OX) 400 MG tablet, magnesium oxide 400 mg (241.3 mg magnesium) tablet  Take 1 tablet twice a day by oral route., Disp: , Rfl:     Melatonin 3 MG CAPS, Take 3 mg by mouth, Disp: , Rfl:     busPIRone (BUSPAR) 10 MG tablet, buspirone 10 mg tablet  1 tablet twice day as needed for anxiety, Disp: , Rfl:     diclofenac (VOLTAREN) 75 MG EC tablet, TAKE 1 TAB BY MOUTH TWICE A DAY X14 DAYS AS NEEDED FOR PAIN, SWELLING WITH FOOD. NOT W/ OTHER NSAIDS, Disp: , Rfl:     FLUoxetine (PROZAC) 20 MG capsule, Prozac 20 mg capsule  take 2 pills daily in the morning, Disp: , Rfl:     zolpidem (AMBIEN) 10 MG tablet, zolpidem 10 mg tablet  1/2 to one tablet qhs PRN, Disp: , Rfl:     miSOPROStol (CYTOTEC) 200 MCG tablet, Take 200 mcg p.o. at bedtime starting 3 nights before your follow-up GYN office visit for endometrial biopsy. , Disp: 3 tablet, Rfl: 0    cefdinir (OMNICEF) 300 MG capsule, Take 1 capsule by mouth 2 times daily for 10 days, Disp: 20 capsule, Rfl: 0    meloxicam (MOBIC) 7.5 MG tablet, Take 1 tablet by mouth daily As directed for pain with food, Disp: 30 tablet, Rfl: 0    methylPREDNISolone (MEDROL, KARY,) 4 MG tablet, Take 1 tablet by mouth See Admin Instructions Take by mouth., Disp: 1 kit, Rfl: 0    fexofenadine (ALLEGRA) 180 MG tablet, Take 1 tablet by mouth daily, Disp: 30 tablet, Rfl: 0    naproxen (EC-NAPROSYN) 500 MG EC tablet, Take 1 tablet by mouth once for 1 dose Take 1-2 hours before endometrial biopsy, Disp: 1 tablet, Rfl: 0  Past Surgical History:   Procedure Laterality Date     SECTION       There is no problem list on file for this patient. Tobacco:  reports that she has never smoked. She has never used smokeless tobacco.  Alcohol:   Social History     Substance and Sexual Activity   Alcohol Use Yes    Comment: occ        Physical Exam:   BMI: Body mass index is 27.68 kg/m². GENERAL:  Adult in no acute distress, well developed, well nourished . Patient is appropriately conversant  CERVICAL SPINE:  Inspection of the neck reveals no evidence of rash or skin lesion. Examination of the cervical spine reveals no evidence of sagittal or coronal plane deformity, decreased ROM, and palpable tenderness  Spurling's sign is positive to the left side for reproduction of radicular symptoms. Patient ambulates with a normal gait. Sensory testing reveals intact sensation to light touch in the distribution of the C5-T1 dermatomes bilaterally  Reflexes     Right Left   Biceps (C5) 2 2   Brachio radialis (C6) 2 2    Triceps (C7) 2 3     Begum's is negative         Tinel's and Gloria testing over the cubital and carpal tunnels do not reproduce the symptoms. Shoulder examination is not consistent with adhesive capsulitis or acute rotator cuff tendinitis. Strength testing in the upper extremity reveals the following based on the 5 point grading scale:     Delt(C5) Bicep(C6) WE(C6) Tricep (C7) WF(C7) (C8) Int (T1)   Right 5 5 5 5 5 5 5   Left 3 5 5 5 5 5 5     Pulses are palpable over bilateral radial arteries. Radiographic Studies:     AP and Lateral views of the Cervical Spine independently reviewed:   Advanced disc degeneration from C4-C7. Assessment/Plan:       Diagnosis Orders   1.  Cervical stenosis of spinal canal        2. Left arm weakness        3. Cervical myelopathy ([address]            This patient's clinical history and physical exam is consistent with cervical radiculopathy involving primarily the left C5 and C6 nerve root(s). Is exhibiting signs of radiculopathy but also has deltoid weakness with known previous history of advanced cervical stenosis that was severe with cervical myelopathy. She has never had corrective surgery for this. I suspect she is having worsening myelopathy symptoms. We will go ahead and obtain a new MRI of the cervical spine. I will see her back after. I discussed the natural history of this condition with the patient in that often these patients will experience diminishing of their symptoms within several weeks of conservative care. We also discussed that the typical conservative treatments available include rest, NSAIDs, pain medication, and physical therapy as symptoms allow. Oral and/or epidural steroids are other options to consider. I discussed potential surgical options including a discectomy and fusion if the symptoms fail to improve or there is a progressive neurologic deficit and conservative management has been exhausted. -MRI: A cervical MRI was ordered to confirm the diagnosis and assess the severity. No orders of the defined types were placed in this encounter. No orders of the defined types were placed in this encounter. 3 This is stable chronic illness/condition      Return for MRI results. KARINE Sotomayor - CNP  02/24/23      Elements of this note were created using speech recognition software. As such, errors of speech recognition may be present.

## 2023-02-27 LAB
A VAGINAE DNA VAG QL NAA+PROBE: NORMAL SCORE
BVAB2 DNA VAG QL NAA+PROBE: NORMAL SCORE
C ALBICANS DNA VAG QL NAA+PROBE: NEGATIVE
C GLABRATA DNA VAG QL NAA+PROBE: NEGATIVE
C TRACH RRNA SPEC QL NAA+PROBE: NEGATIVE
CANDIDA KRUSEI: NEGATIVE
CANDIDA LUSITANIAE, NAA, 180015: NEGATIVE
CANDIDA PARAPSILOSIS/TROPICALIS: NEGATIVE
MEGA1 DNA VAG QL NAA+PROBE: NORMAL SCORE
N GONORRHOEA RRNA SPEC QL NAA+PROBE: NEGATIVE
T VAGINALIS RRNA SPEC QL NAA+PROBE: NEGATIVE

## 2023-02-28 ENCOUNTER — TELEPHONE (OUTPATIENT)
Dept: FAMILY MEDICINE CLINIC | Facility: CLINIC | Age: 68
End: 2023-02-28

## 2023-02-28 NOTE — TELEPHONE ENCOUNTER
----- Message from [facility] sent at 2/27/2023  9:51 AM EST -----  Subject: Appointment Request    Reason for Call: New Patient/New to Provider Appointment needed: New   Patient Request Appointment    QUESTIONS    Reason for appointment request? Available appointments did not meet   patient need     Additional Information for Provider? Patient called in to schedule a new   patient appointment as well as a ED follow up in regards to a lot things,   such as neck and shoulder pain, UTI, somethings with her uterus. Available   appointments does not meet patient needs. Patient would like to be seen   sooner than 3/29/2023. Patient has [employer]. Patient prefers an appointment for 1:30 or after.  Please   contact patient to further assist.   ---------------------------------------------------------------------------  --------------  [employer] INFO  [phone]; OK to leave message on voicemail  ---------------------------------------------------------------------------  --------------  SCRIPT ANSWERS  COVID Screen: Monica Calvo

## 2023-03-01 LAB
CYTOLOGIST CVX/VAG CYTO: NORMAL
CYTOLOGY CVX/VAG DOC THIN PREP: NORMAL
HPV APTIMA: NEGATIVE
HPV GENOTYPE REFLEX: NORMAL
Lab: NORMAL
PATH REPORT.FINAL DX SPEC: NORMAL
STAT OF ADQ CVX/VAG CYTO-IMP: NORMAL

## 2023-03-16 ENCOUNTER — TELEPHONE (OUTPATIENT)
Dept: OBGYN CLINIC | Age: 68
End: 2023-03-16

## 2023-03-16 NOTE — TELEPHONE ENCOUNTER
3/16/23 4:56 pm JOYCE JIMENEZ MA      Returned pt call in regards to a Rx for Sreekanth Fess. Pt did not answer so I LMOV to return call. I also sent pt a [employer] message with this information.

## 2023-03-16 NOTE — TELEPHONE ENCOUNTER
Patient LM that she was given 3 meds to take prior to procedure and she doesn't know how to take them.   Will ask dr Jorge L Sun and let her know

## 2023-03-17 ENCOUNTER — TELEPHONE (OUTPATIENT)
Dept: OBGYN CLINIC | Age: 68
End: 2023-03-17

## 2023-03-17 NOTE — TELEPHONE ENCOUNTER
----- Message from Shani Zacarias MA sent at 3/17/2023 10:55 AM EDT -----  Regarding: FW: Hydrocodone    ----- Message -----  From: Ana Laura Barone  Sent: 3/16/2023   7:24 PM EDT  To: [facility] Clinical Staff  Subject: Hydrocodone                                      I got all three but I need to know how to take them do I take them tonight before on that Monday or do I take them that Tuesday morning the [address]    3/17/23 11:00 AM  JOYCE JIMENEZ MA  Called pt back and gave her instruction on how to take meds prior to her procedure. She verbalized understanding.

## 2023-03-21 ENCOUNTER — TELEPHONE (OUTPATIENT)
Dept: OBGYN CLINIC | Age: 68
End: 2023-03-21

## 2023-03-21 ENCOUNTER — PROCEDURE VISIT (OUTPATIENT)
Dept: OBGYN CLINIC | Age: 68
End: 2023-03-21
Payer: MEDICARE

## 2023-03-21 VITALS
BODY MASS INDEX: 28.7 KG/M2 | WEIGHT: 162 LBS | SYSTOLIC BLOOD PRESSURE: 137 MMHG | DIASTOLIC BLOOD PRESSURE: 88 MMHG | HEIGHT: 63 IN

## 2023-03-21 DIAGNOSIS — N39.3 SUI (STRESS URINARY INCONTINENCE, FEMALE): ICD-10-CM

## 2023-03-21 DIAGNOSIS — R93.89 ENDOMETRIAL THICKENING ON ULTRASOUND: Primary | ICD-10-CM

## 2023-03-21 PROCEDURE — 1123F ACP DISCUSS/DSCN MKR DOCD: CPT | Performed by: OBSTETRICS & GYNECOLOGY

## 2023-03-21 PROCEDURE — 3017F COLORECTAL CA SCREEN DOC REV: CPT | Performed by: OBSTETRICS & GYNECOLOGY

## 2023-03-21 PROCEDURE — G8484 FLU IMMUNIZE NO ADMIN: HCPCS | Performed by: OBSTETRICS & GYNECOLOGY

## 2023-03-21 PROCEDURE — 1036F TOBACCO NON-USER: CPT | Performed by: OBSTETRICS & GYNECOLOGY

## 2023-03-21 PROCEDURE — 0509F URINE INCON PLAN DOCD: CPT | Performed by: OBSTETRICS & GYNECOLOGY

## 2023-03-21 PROCEDURE — G8417 CALC BMI ABV UP PARAM F/U: HCPCS | Performed by: OBSTETRICS & GYNECOLOGY

## 2023-03-21 PROCEDURE — 1090F PRES/ABSN URINE INCON ASSESS: CPT | Performed by: OBSTETRICS & GYNECOLOGY

## 2023-03-21 PROCEDURE — G8427 DOCREV CUR MEDS BY ELIG CLIN: HCPCS | Performed by: OBSTETRICS & GYNECOLOGY

## 2023-03-21 PROCEDURE — 99213 OFFICE O/P EST LOW 20 MIN: CPT | Performed by: OBSTETRICS & GYNECOLOGY

## 2023-03-21 PROCEDURE — G8400 PT W/DXA NO RESULTS DOC: HCPCS | Performed by: OBSTETRICS & GYNECOLOGY

## 2023-03-21 SDOH — ECONOMIC STABILITY: FOOD INSECURITY: WITHIN THE PAST 12 MONTHS, THE FOOD YOU BOUGHT JUST DIDN'T LAST AND YOU DIDN'T HAVE MONEY TO GET MORE.: PATIENT DECLINED

## 2023-03-21 SDOH — ECONOMIC STABILITY: FOOD INSECURITY: WITHIN THE PAST 12 MONTHS, YOU WORRIED THAT YOUR FOOD WOULD RUN OUT BEFORE YOU GOT MONEY TO BUY MORE.: PATIENT DECLINED

## 2023-03-21 SDOH — ECONOMIC STABILITY: INCOME INSECURITY: HOW HARD IS IT FOR YOU TO PAY FOR THE VERY BASICS LIKE FOOD, HOUSING, MEDICAL CARE, AND HEATING?: PATIENT DECLINED

## 2023-03-21 SDOH — ECONOMIC STABILITY: HOUSING INSECURITY
IN THE LAST 12 MONTHS, WAS THERE A TIME WHEN YOU DID NOT HAVE A STEADY PLACE TO SLEEP OR SLEPT IN A SHELTER (INCLUDING NOW)?: PATIENT REFUSED

## 2023-03-21 NOTE — PATIENT INSTRUCTIONS
[facility]  Phone: [phone]  Located at [facility]. Emergency Food Pantry Hours: Mon, Wed, and Fri 9am-1pm  Website: [email or web address]  [address] Pantry  Phone: [phone]  Located at [address]. Call for hours and availability   Website: [email or web address]  [address] Pant[address]  Phone: [phone]  Located at [address]. Call for hours and availability; serves up to 76 families a week, based on donations  Website: [email or web address]      Need additional resources? Call 211 or Find Help: https://www. findhelp.org/FINANCIAL RESOURCES    [employer]   o What they offer: The [employer] Program helps uninsured patients who do not qualify for government-sponsored health insurance and cannot afford to pay for their medical care. Insured patients may also qualify for assistance based on family income, family size, and medical needs. o Phone Number: [phone]      o How to apply for the [employer] Program:   Option 1: To apply for financial assistance, a patient (or their family or other provider) should fill out the Financial Assistance Application. Copies of the Financial Assistance Application and the FAP may be obtained for free by calling the [facility] customer service department at [phone]. Option 2: The Financial Assistance Application and policy may be obtained for free by downloading a copy from the [employer]: o http://walton-ramos.org/. com/patient-resources/financial-assistance       o Applications are available in several languages on the website     [employer]  What they offer:  May be able to assist with medical bills if you are uninsured. Phone number: [phone]  www. [employer]    [employer] and Resources for the [employer] (uninsured and under insured) A Nurse and  are available.   [employer]

## 2023-03-21 NOTE — PROGRESS NOTES
History     Socioeconomic History    Marital status:      Spouse name: Not on file    Number of children: Not on file    Years of education: Not on file    Highest education level: Not on file   Occupational History    Not on file   Tobacco Use    Smoking status: Never    Smokeless tobacco: Never   Vaping Use    Vaping Use: Never used   Substance and Sexual Activity    Alcohol use: Yes     Comment: occ    Drug use: Never    Sexual activity: Not Currently     Partners: Male   Other Topics Concern    Not on file   Social History Narrative    Not on file     Social Determinants of Health     Financial Resource Strain: Unknown    Difficulty of Paying Living Expenses: Patient refused   Food Insecurity: Unknown    Worried About Running Out of Food in the Last Year: Patient refused    [address] in the Last Year: Patient refused   Transportation Needs: Unknown    Lack of Transportation (Medical): Not on file    Lack of Transportation (Non-Medical): Patient refused   Physical Activity: Not on file   Stress: Not on file   Social Connections: Not on file   Intimate Partner Violence: Not on file   Housing Stability: Unknown    Unable to Pay for Housing in the Last Year: Not on file    Number of [place] in the Last Year: Not on file    Unstable Housing in the Last Year: Patient refused       Family History:  Family History   Problem Relation Age of Onset    Stroke Mother     Pseudotumor Cerebri Child     Hypertension Son         Both sons       Physical Exam:  /88   Ht 5' 3\" (1.6 m)   Wt 162 lb (73.5 kg)   LMP  (LMP Unknown)   BMI 28.70 kg/m²     Skinny Cook is a 74 y.o. female who appears pleasant, well developed, well nourished, with good attention to hygiene and body habitus. Oriented to person, place and time. Skinny Cook is in no apparent distress. Psych:  Mood and affect are normal and appropriate to the situation.   Head is normocephalic, atraumatic, without any gross head or neck

## 2023-03-27 ENCOUNTER — NURSE ONLY (OUTPATIENT)
Dept: OBGYN CLINIC | Age: 68
End: 2023-03-27

## 2023-04-05 ENCOUNTER — HOSPITAL ENCOUNTER (EMERGENCY)
Age: 68
Discharge: HOME | End: 2023-04-05
Attending: EMERGENCY MEDICINE
Payer: MEDICARE

## 2023-04-05 ENCOUNTER — APPOINTMENT (OUTPATIENT)
Dept: GENERAL RADIOLOGY | Age: 68
End: 2023-04-05
Payer: MEDICARE

## 2023-04-05 VITALS
DIASTOLIC BLOOD PRESSURE: 64 MMHG | SYSTOLIC BLOOD PRESSURE: 128 MMHG | HEART RATE: 71 BPM | HEIGHT: 63 IN | BODY MASS INDEX: 28.88 KG/M2 | RESPIRATION RATE: 21 BRPM | TEMPERATURE: 99.6 F | WEIGHT: 163 LBS | OXYGEN SATURATION: 98 %

## 2023-04-05 DIAGNOSIS — U07.1 COVID-19 VIRUS INFECTION: Primary | ICD-10-CM

## 2023-04-05 DIAGNOSIS — N30.00 ACUTE CYSTITIS WITHOUT HEMATURIA: ICD-10-CM

## 2023-04-05 LAB
APPEARANCE UR: ABNORMAL
BACTERIA URNS QL MICRO: ABNORMAL /HPF
BILIRUB UR QL: ABNORMAL
CASTS URNS QL MICRO: ABNORMAL /LPF
COLOR UR: ABNORMAL
EPI CELLS #/AREA URNS HPF: ABNORMAL /HPF
GLUCOSE UR STRIP.AUTO-MCNC: NEGATIVE MG/DL
HGB UR QL STRIP: NEGATIVE
KETONES UR QL STRIP.AUTO: ABNORMAL MG/DL
LEUKOCYTE ESTERASE UR QL STRIP.AUTO: ABNORMAL
MUCOUS THREADS URNS QL MICRO: ABNORMAL /LPF
NITRITE UR QL STRIP.AUTO: NEGATIVE
OTHER OBSERVATIONS: ABNORMAL
PH UR STRIP: 5 (ref 5–9)
PROT UR STRIP-MCNC: 100 MG/DL
RBC #/AREA URNS HPF: ABNORMAL /HPF
SP GR UR REFRACTOMETRY: 1.03 (ref 1–1.02)
UROBILINOGEN UR QL STRIP.AUTO: 1 EU/DL (ref 0.2–1)
WBC URNS QL MICRO: ABNORMAL /HPF

## 2023-04-05 PROCEDURE — 99284 EMERGENCY DEPT VISIT MOD MDM: CPT

## 2023-04-05 PROCEDURE — 81001 URINALYSIS AUTO W/SCOPE: CPT

## 2023-04-05 PROCEDURE — 71045 X-RAY EXAM CHEST 1 VIEW: CPT

## 2023-04-05 RX ORDER — CEPHALEXIN 500 MG/1
500 CAPSULE ORAL 2 TIMES DAILY
Qty: 14 CAPSULE | Refills: 0 | Status: SHIPPED | OUTPATIENT
Start: 2023-04-05 | End: 2023-04-12

## 2023-04-05 ASSESSMENT — ENCOUNTER SYMPTOMS: SORE THROAT: 0

## 2023-04-05 ASSESSMENT — PAIN SCALES - GENERAL: PAINLEVEL_OUTOF10: 9

## 2023-04-05 ASSESSMENT — PAIN DESCRIPTION - ORIENTATION: ORIENTATION: LEFT

## 2023-04-05 ASSESSMENT — PAIN - FUNCTIONAL ASSESSMENT: PAIN_FUNCTIONAL_ASSESSMENT: 0-10

## 2023-04-05 ASSESSMENT — PAIN DESCRIPTION - LOCATION: LOCATION: SHOULDER

## 2023-04-05 NOTE — ED PROVIDER NOTES
Emergency Department Provider Note       PCP: KARINE Ordoñez - ABBY   Age: 74 y.o. Sex: female     DISPOSITION Decision To Discharge 04/05/2023 11:30:54 AM       ICD-10-CM    1. COVID-19 virus infection  U07.1       2. Acute cystitis without hematuria  N30.00           Medical Decision Making     Complexity of Problems Addressed:  Complexity of Problem: 1 acute, uncomplicated illness or injury. Complexity of Problem: 1 acute illness with systemic symptoms. Data Reviewed and Analyzed:  Category 1:   I independently ordered and reviewed each unique test.  I reviewed external records: ED visit note from an outside group. I reviewed external records: provider visit note from PCP. I reviewed external records: provider visit note from outside specialist.  I reviewed external records: previous lab results from outside ED. Category 2:   I interpreted the X-rays. No focal pneumonia    Category 3: Discussion of management or test interpretation. Patient is well-appearing here. She is not hypoxic. Will check urinalysis. Did obtain chest x-ray to shows no pneumonia. She does qualify for Paxlovid. Risk of Complications and/or Morbidity of Patient Management:  Prescription drug management performed    History     Cyrus Dixon is a 74 y.o. female who presents to the Emergency Department with chief complaint of    Chief Complaint   Patient presents with    Positive For Covid-19      Patient presents ER with complaints of testing positive for COVID. Patient states symptoms started a couple nights ago. States she had fever, body aches, arm pain cough and shortness of breath. States she had a home COVID test that was positive today. Her secondary complaint is frequently urinating. Denies any dysuria. States she tested positive for UTI a couple weeks ago. Denies any abdominal pain or vomiting. The history is provided by the patient.    Positive For Covid-19  Max temp prior to arrival:

## 2023-04-05 NOTE — DISCHARGE INSTRUCTIONS
Take medications as prescribed  Take Tylenol or ibuprofen as needed for fever or body aches  Drink plenty liquids  Follow-up with your primary care physician

## 2023-04-05 NOTE — ED NOTES
I have reviewed discharge instructions with the patient. The patient verbalized understanding. Patient left ED via Discharge Method: ambulatory to Home with self. Opportunity for questions and clarification provided. Patient given 2 scripts. To continue your aftercare when you leave the hospital, you may receive an automated call from our care team to check in on how you are doing. This is a free service and part of our promise to provide the best care and service to meet your aftercare needs.  If you have questions, or wish to unsubscribe from this service please call [phone]. Thank you for Choosing our [facility] Emergency Department.         Brittany Minor RN  04/05/23 1496

## 2023-04-05 NOTE — ED TRIAGE NOTES
Patient ambulatory to triage. Patient states she had a positive COVID test at home this morning. Patient c\o L shoulder pain, fatigue, and frequency urination.

## 2023-04-06 ENCOUNTER — CARE COORDINATION (OUTPATIENT)
Dept: CARE COORDINATION | Facility: CLINIC | Age: 68
End: 2023-04-06

## 2023-04-06 NOTE — CARE COORDINATION
Patient contacted regarding COVID-19 diagnosis. Discussed COVID-19 related testing which was available at this time. Test results were positive. Patient informed of results, if available? Yes. LPN Care Coordinator contacted the patient by telephone to perform post discharge assessment. Call within 2 business days of discharge: Yes. Verified name and  with patient as identifiers. Provided introduction to self, and explanation of the CTN/ACM role, and reason for call due to risk factors for infection and/or exposure to COVID-19. Symptoms reviewed with patient who verbalized the following symptoms: fever  fatigue. Patient reports starting her abx for UTI as well as the Paxlovid. Instructed patient to push fluids. Due to no new or worsening symptoms encounter was not routed to provider for escalation. Discussed follow-up appointments. If no appointment was previously scheduled, appointment scheduling offered: Patient states she will contact her OB/GYN today as she was to have a biopsy soon. Patient states she will contact PCP as well as she is asking to see a different provider. [facility] follow up appointment(s):   Future Appointments   Date Time Provider Dru Gunn   2023  8:45 AM KARINE Hutson - CNP [facility] AMB     Non-[facility] follow up appointment(s): n/a    Non-face-to-face services provided:  Obtained and reviewed discharge summary and/or continuity of care documents  Education of patient/family/caregiver/guardian to support self-management-patient verbalized understanding     Advance Care Planning:   Does patient have an Advance Directive:  decision maker updated. Educated patient about risk for severe COVID-19 due to risk factors according to CDC guidelines. LPN CC reviewed discharge instructions, medical action plan and red flag symptoms with the patient who verbalized understanding. Discussed COVID vaccination status: Yes.  Education provided on COVID-19 vaccination as

## 2023-04-27 ENCOUNTER — TELEMEDICINE (OUTPATIENT)
Dept: PRIMARY CARE CLINIC | Facility: CLINIC | Age: 68
End: 2023-04-27
Payer: MEDICARE

## 2023-04-27 DIAGNOSIS — G47.09 OTHER INSOMNIA: ICD-10-CM

## 2023-04-27 DIAGNOSIS — Z12.31 ENCOUNTER FOR SCREENING MAMMOGRAM FOR MALIGNANT NEOPLASM OF BREAST: ICD-10-CM

## 2023-04-27 DIAGNOSIS — I10 PRIMARY HYPERTENSION: Primary | ICD-10-CM

## 2023-04-27 DIAGNOSIS — E78.00 HYPERCHOLESTEREMIA: ICD-10-CM

## 2023-04-27 DIAGNOSIS — J30.89 ENVIRONMENTAL AND SEASONAL ALLERGIES: ICD-10-CM

## 2023-04-27 DIAGNOSIS — Z78.0 POST-MENOPAUSAL: ICD-10-CM

## 2023-04-27 DIAGNOSIS — Z76.89 ENCOUNTER TO ESTABLISH CARE WITH NEW DOCTOR: ICD-10-CM

## 2023-04-27 DIAGNOSIS — J45.40 MODERATE PERSISTENT ASTHMA, UNSPECIFIED WHETHER COMPLICATED: ICD-10-CM

## 2023-04-27 DIAGNOSIS — F32.A ANXIETY AND DEPRESSION: ICD-10-CM

## 2023-04-27 DIAGNOSIS — F41.9 ANXIETY AND DEPRESSION: ICD-10-CM

## 2023-04-27 DIAGNOSIS — M25.512 ACUTE PAIN OF LEFT SHOULDER: ICD-10-CM

## 2023-04-27 PROCEDURE — G8427 DOCREV CUR MEDS BY ELIG CLIN: HCPCS | Performed by: FAMILY MEDICINE

## 2023-04-27 PROCEDURE — 1123F ACP DISCUSS/DSCN MKR DOCD: CPT | Performed by: FAMILY MEDICINE

## 2023-04-27 PROCEDURE — 99203 OFFICE O/P NEW LOW 30 MIN: CPT | Performed by: FAMILY MEDICINE

## 2023-04-27 PROCEDURE — 3017F COLORECTAL CA SCREEN DOC REV: CPT | Performed by: FAMILY MEDICINE

## 2023-04-27 PROCEDURE — G8400 PT W/DXA NO RESULTS DOC: HCPCS | Performed by: FAMILY MEDICINE

## 2023-04-27 PROCEDURE — 1090F PRES/ABSN URINE INCON ASSESS: CPT | Performed by: FAMILY MEDICINE

## 2023-04-27 RX ORDER — ATORVASTATIN CALCIUM 40 MG/1
40 TABLET, FILM COATED ORAL DAILY
COMMUNITY
Start: 2023-04-08

## 2023-04-27 RX ORDER — FEXOFENADINE HCL 180 MG/1
180 TABLET ORAL DAILY
Qty: 90 TABLET | Refills: 1 | Status: SHIPPED | OUTPATIENT
Start: 2023-04-27

## 2023-04-27 RX ORDER — FEXOFENADINE HCL 180 MG/1
180 TABLET ORAL DAILY
COMMUNITY
Start: 2023-04-17 | End: 2023-04-27 | Stop reason: SDUPTHER

## 2023-04-27 RX ORDER — FLUOXETINE HYDROCHLORIDE 40 MG/1
CAPSULE ORAL
COMMUNITY
Start: 2023-03-23 | End: 2023-04-27 | Stop reason: CLARIF

## 2023-04-27 SDOH — ECONOMIC STABILITY: FOOD INSECURITY: WITHIN THE PAST 12 MONTHS, YOU WORRIED THAT YOUR FOOD WOULD RUN OUT BEFORE YOU GOT MONEY TO BUY MORE.: SOMETIMES TRUE

## 2023-04-27 SDOH — ECONOMIC STABILITY: HOUSING INSECURITY
IN THE LAST 12 MONTHS, WAS THERE A TIME WHEN YOU DID NOT HAVE A STEADY PLACE TO SLEEP OR SLEPT IN A SHELTER (INCLUDING NOW)?: NO

## 2023-04-27 SDOH — ECONOMIC STABILITY: FOOD INSECURITY: WITHIN THE PAST 12 MONTHS, THE FOOD YOU BOUGHT JUST DIDN'T LAST AND YOU DIDN'T HAVE MONEY TO GET MORE.: NEVER TRUE

## 2023-04-27 SDOH — ECONOMIC STABILITY: INCOME INSECURITY: HOW HARD IS IT FOR YOU TO PAY FOR THE VERY BASICS LIKE FOOD, HOUSING, MEDICAL CARE, AND HEATING?: SOMEWHAT HARD

## 2023-04-27 ASSESSMENT — PATIENT HEALTH QUESTIONNAIRE - PHQ9
6. FEELING BAD ABOUT YOURSELF - OR THAT YOU ARE A FAILURE OR HAVE LET YOURSELF OR YOUR FAMILY DOWN: 0
4. FEELING TIRED OR HAVING LITTLE ENERGY: 3
9. THOUGHTS THAT YOU WOULD BE BETTER OFF DEAD, OR OF HURTING YOURSELF: 0
1. LITTLE INTEREST OR PLEASURE IN DOING THINGS: 2
5. POOR APPETITE OR OVEREATING: 1
10. IF YOU CHECKED OFF ANY PROBLEMS, HOW DIFFICULT HAVE THESE PROBLEMS MADE IT FOR YOU TO DO YOUR WORK, TAKE CARE OF THINGS AT HOME, OR GET ALONG WITH OTHER PEOPLE: 0
SUM OF ALL RESPONSES TO PHQ9 QUESTIONS 1 & 2: 3
8. MOVING OR SPEAKING SO SLOWLY THAT OTHER PEOPLE COULD HAVE NOTICED. OR THE OPPOSITE, BEING SO FIGETY OR RESTLESS THAT YOU HAVE BEEN MOVING AROUND A LOT MORE THAN USUAL: 1
SUM OF ALL RESPONSES TO PHQ QUESTIONS 1-9: 13
3. TROUBLE FALLING OR STAYING ASLEEP: 3
7. TROUBLE CONCENTRATING ON THINGS, SUCH AS READING THE NEWSPAPER OR WATCHING TELEVISION: 2
2. FEELING DOWN, DEPRESSED OR HOPELESS: 1

## 2023-04-27 NOTE — PROGRESS NOTES
her medications which is Allegra that she takes for her allergies. She states that she has refill on all of her other medications at this time. She used to previously see a new [facility] in [facility] and states that she decided to switch care. She follows with psychiatrist, orthopedic, OB/GYN specialist.  She states that she is up-to-date on Pap smear and had it in February 2023. She is also up-to-date on colonoscopy and had it in 2016 and is due in 2026. She is willing to get mammogram and bone density scan done. She states that she has left shoulder pain, that has been ongoing for past several months. She had x-ray done in the ER and also seen in orthopedic physician for it. She states that meloxicam does not work for the pain. She was advised to do physical therapy and she never followed up on it. She states that she continues with the pain and is willing to do therapy at this time. Denies headache, dizziness, fever, chills, cough, shortness of breath, wheezing, chest pain, palpitations, nausea, vomiting, diarrhea, abdominal pain, urinary problems. Denies all other review of system. No other concerns or complaints. Review of Systems   Constitutional:  Negative for appetite change, chills, fatigue, fever and unexpected weight change. HENT:  Negative for congestion, ear discharge, ear pain, postnasal drip, rhinorrhea, sinus pressure, sinus pain, sneezing and sore throat. Eyes:  Negative for pain, redness and visual disturbance. Respiratory:  Negative for cough, chest tightness, shortness of breath and wheezing. Cardiovascular:  Negative for chest pain, palpitations and leg swelling. Gastrointestinal:  Negative for abdominal pain, blood in stool, constipation, diarrhea, nausea and vomiting. Musculoskeletal:  Positive for arthralgias. Negative for back pain, gait problem, neck pain and neck stiffness. Skin:  Negative for rash and wound.    Neurological:  Negative for dizziness, tremors,

## 2023-04-28 PROBLEM — F32.A ANXIETY AND DEPRESSION: Status: ACTIVE | Noted: 2023-04-28

## 2023-04-28 PROBLEM — F41.9 ANXIETY AND DEPRESSION: Status: ACTIVE | Noted: 2023-04-28

## 2023-04-28 PROBLEM — I10 PRIMARY HYPERTENSION: Status: ACTIVE | Noted: 2023-04-28

## 2023-04-28 PROBLEM — E78.00 HYPERCHOLESTEREMIA: Status: ACTIVE | Noted: 2023-04-28

## 2023-04-28 PROBLEM — J30.89 ENVIRONMENTAL AND SEASONAL ALLERGIES: Status: ACTIVE | Noted: 2023-04-28

## 2023-04-28 PROBLEM — J45.40 MODERATE PERSISTENT ASTHMA: Status: ACTIVE | Noted: 2023-04-28

## 2023-04-28 PROBLEM — M25.512 ACUTE PAIN OF LEFT SHOULDER: Status: ACTIVE | Noted: 2023-04-28

## 2023-04-28 PROBLEM — G47.09 OTHER INSOMNIA: Status: ACTIVE | Noted: 2023-04-28

## 2023-04-28 ASSESSMENT — ENCOUNTER SYMPTOMS
BACK PAIN: 0
SORE THROAT: 0
EYE REDNESS: 0
CHEST TIGHTNESS: 0
DIARRHEA: 0
RHINORRHEA: 0
WHEEZING: 0
BLOOD IN STOOL: 0
EYE PAIN: 0
VOMITING: 0
SHORTNESS OF BREATH: 0
NAUSEA: 0
SINUS PAIN: 0
COUGH: 0
CONSTIPATION: 0
ABDOMINAL PAIN: 0
SINUS PRESSURE: 0

## 2023-05-02 ENCOUNTER — TELEPHONE (OUTPATIENT)
Dept: PRIMARY CARE CLINIC | Facility: CLINIC | Age: 68
End: 2023-05-02

## 2023-05-16 ENCOUNTER — HOSPITAL ENCOUNTER (OUTPATIENT)
Dept: MAMMOGRAPHY | Age: 68
Discharge: HOME | End: 2023-05-19
Payer: MEDICARE

## 2023-05-16 DIAGNOSIS — Z78.0 POST-MENOPAUSAL: ICD-10-CM

## 2023-05-16 DIAGNOSIS — Z12.31 ENCOUNTER FOR SCREENING MAMMOGRAM FOR MALIGNANT NEOPLASM OF BREAST: ICD-10-CM

## 2023-05-16 PROCEDURE — 77067 SCR MAMMO BI INCL CAD: CPT

## 2023-05-16 PROCEDURE — 77080 DXA BONE DENSITY AXIAL: CPT

## 2023-05-24 ENCOUNTER — TELEMEDICINE (OUTPATIENT)
Dept: PRIMARY CARE CLINIC | Facility: CLINIC | Age: 68
End: 2023-05-24
Payer: MEDICARE

## 2023-05-24 DIAGNOSIS — M85.89 OSTEOPENIA OF MULTIPLE SITES: ICD-10-CM

## 2023-05-24 DIAGNOSIS — U07.1 COVID-19 VIRUS INFECTION: ICD-10-CM

## 2023-05-24 DIAGNOSIS — R05.1 ACUTE COUGH: ICD-10-CM

## 2023-05-24 DIAGNOSIS — R09.81 NASAL CONGESTION: Primary | ICD-10-CM

## 2023-05-24 PROCEDURE — 99213 OFFICE O/P EST LOW 20 MIN: CPT | Performed by: FAMILY MEDICINE

## 2023-05-24 PROCEDURE — 1090F PRES/ABSN URINE INCON ASSESS: CPT | Performed by: FAMILY MEDICINE

## 2023-05-24 PROCEDURE — 3017F COLORECTAL CA SCREEN DOC REV: CPT | Performed by: FAMILY MEDICINE

## 2023-05-24 PROCEDURE — G8427 DOCREV CUR MEDS BY ELIG CLIN: HCPCS | Performed by: FAMILY MEDICINE

## 2023-05-24 PROCEDURE — 1123F ACP DISCUSS/DSCN MKR DOCD: CPT | Performed by: FAMILY MEDICINE

## 2023-05-24 PROCEDURE — G8399 PT W/DXA RESULTS DOCUMENT: HCPCS | Performed by: FAMILY MEDICINE

## 2023-05-24 RX ORDER — GUAIFENESIN 600 MG/1
TABLET, EXTENDED RELEASE ORAL
COMMUNITY

## 2023-05-24 RX ORDER — OMEPRAZOLE 40 MG/1
CAPSULE, DELAYED RELEASE ORAL
COMMUNITY

## 2023-05-24 RX ORDER — LISINOPRIL AND HYDROCHLOROTHIAZIDE 12.5; 1 MG/1; MG/1
TABLET ORAL
COMMUNITY
End: 2023-05-24 | Stop reason: CLARIF

## 2023-05-24 ASSESSMENT — PATIENT HEALTH QUESTIONNAIRE - PHQ9
2. FEELING DOWN, DEPRESSED OR HOPELESS: 0
9. THOUGHTS THAT YOU WOULD BE BETTER OFF DEAD, OR OF HURTING YOURSELF: 0
SUM OF ALL RESPONSES TO PHQ QUESTIONS 1-9: 10
5. POOR APPETITE OR OVEREATING: 3
3. TROUBLE FALLING OR STAYING ASLEEP: 1
4. FEELING TIRED OR HAVING LITTLE ENERGY: 3
SUM OF ALL RESPONSES TO PHQ9 QUESTIONS 1 & 2: 1
SUM OF ALL RESPONSES TO PHQ QUESTIONS 1-9: 10
6. FEELING BAD ABOUT YOURSELF - OR THAT YOU ARE A FAILURE OR HAVE LET YOURSELF OR YOUR FAMILY DOWN: 0
1. LITTLE INTEREST OR PLEASURE IN DOING THINGS: 1
10. IF YOU CHECKED OFF ANY PROBLEMS, HOW DIFFICULT HAVE THESE PROBLEMS MADE IT FOR YOU TO DO YOUR WORK, TAKE CARE OF THINGS AT HOME, OR GET ALONG WITH OTHER PEOPLE: 1
8. MOVING OR SPEAKING SO SLOWLY THAT OTHER PEOPLE COULD HAVE NOTICED. OR THE OPPOSITE, BEING SO FIGETY OR RESTLESS THAT YOU HAVE BEEN MOVING AROUND A LOT MORE THAN USUAL: 1
7. TROUBLE CONCENTRATING ON THINGS, SUCH AS READING THE NEWSPAPER OR WATCHING TELEVISION: 1

## 2023-05-24 ASSESSMENT — ENCOUNTER SYMPTOMS
SORE THROAT: 0
ABDOMINAL PAIN: 0
SINUS PAIN: 0
COUGH: 1
RHINORRHEA: 0
EYE PAIN: 0
BACK PAIN: 0
VOMITING: 0
NAUSEA: 0
SINUS PRESSURE: 0
SHORTNESS OF BREATH: 0
DIARRHEA: 0
CONSTIPATION: 0
CHEST TIGHTNESS: 0
BLOOD IN STOOL: 0
WHEEZING: 0
EYE REDNESS: 0

## 2023-05-24 NOTE — PROGRESS NOTES
findings:-       On this date 5/24/2023 I have spent 15 minutes reviewing previous notes, test results and face to face (virtual) with the patient discussing the diagnosis and importance of compliance with the treatment plan as well as documenting on the day of the visit. Aries Hudson, was evaluated through a synchronous (real-time) audio-video encounter. The patient (or guardian if applicable) is aware that this is a billable service, which includes applicable co-pays. This Virtual Visit was conducted with patient's (and/or legal guardian's) consent. Patient identification was verified, and a caregiver was present when appropriate. The patient was located at Home: [id number] [facility] [id number]  Provider was located at [address]): [address] [facility],  [address] [place], [place] [address] [address]         --[facility] Railing, DO

## 2023-06-02 ENCOUNTER — TELEPHONE (OUTPATIENT)
Dept: PRIMARY CARE CLINIC | Facility: CLINIC | Age: 68
End: 2023-06-02

## 2023-06-13 ENCOUNTER — OFFICE VISIT (OUTPATIENT)
Dept: PRIMARY CARE CLINIC | Facility: CLINIC | Age: 68
End: 2023-06-13
Payer: MEDICARE

## 2023-06-13 VITALS
SYSTOLIC BLOOD PRESSURE: 138 MMHG | DIASTOLIC BLOOD PRESSURE: 84 MMHG | WEIGHT: 163 LBS | HEART RATE: 79 BPM | TEMPERATURE: 97.2 F | OXYGEN SATURATION: 98 % | BODY MASS INDEX: 28.88 KG/M2 | HEIGHT: 63 IN

## 2023-06-13 DIAGNOSIS — E78.00 HYPERCHOLESTEREMIA: ICD-10-CM

## 2023-06-13 DIAGNOSIS — I10 PRIMARY HYPERTENSION: ICD-10-CM

## 2023-06-13 DIAGNOSIS — M25.512 ACUTE PAIN OF LEFT SHOULDER: Primary | ICD-10-CM

## 2023-06-13 DIAGNOSIS — U07.1 COVID-19 VIRUS INFECTION: ICD-10-CM

## 2023-06-13 DIAGNOSIS — G89.29 CHRONIC MIDLINE LOW BACK PAIN WITH BILATERAL SCIATICA: ICD-10-CM

## 2023-06-13 DIAGNOSIS — M54.42 CHRONIC MIDLINE LOW BACK PAIN WITH BILATERAL SCIATICA: ICD-10-CM

## 2023-06-13 DIAGNOSIS — M54.41 CHRONIC MIDLINE LOW BACK PAIN WITH BILATERAL SCIATICA: ICD-10-CM

## 2023-06-13 PROCEDURE — 1090F PRES/ABSN URINE INCON ASSESS: CPT | Performed by: FAMILY MEDICINE

## 2023-06-13 PROCEDURE — G8427 DOCREV CUR MEDS BY ELIG CLIN: HCPCS | Performed by: FAMILY MEDICINE

## 2023-06-13 PROCEDURE — 4004F PT TOBACCO SCREEN RCVD TLK: CPT | Performed by: FAMILY MEDICINE

## 2023-06-13 PROCEDURE — 3078F DIAST BP <80 MM HG: CPT | Performed by: FAMILY MEDICINE

## 2023-06-13 PROCEDURE — 3017F COLORECTAL CA SCREEN DOC REV: CPT | Performed by: FAMILY MEDICINE

## 2023-06-13 PROCEDURE — 3074F SYST BP LT 130 MM HG: CPT | Performed by: FAMILY MEDICINE

## 2023-06-13 PROCEDURE — 99213 OFFICE O/P EST LOW 20 MIN: CPT | Performed by: FAMILY MEDICINE

## 2023-06-13 PROCEDURE — 1123F ACP DISCUSS/DSCN MKR DOCD: CPT | Performed by: FAMILY MEDICINE

## 2023-06-13 PROCEDURE — G8399 PT W/DXA RESULTS DOCUMENT: HCPCS | Performed by: FAMILY MEDICINE

## 2023-06-13 PROCEDURE — G8417 CALC BMI ABV UP PARAM F/U: HCPCS | Performed by: FAMILY MEDICINE

## 2023-06-13 SDOH — ECONOMIC STABILITY: FOOD INSECURITY: WITHIN THE PAST 12 MONTHS, YOU WORRIED THAT YOUR FOOD WOULD RUN OUT BEFORE YOU GOT MONEY TO BUY MORE.: NEVER TRUE

## 2023-06-13 SDOH — ECONOMIC STABILITY: FOOD INSECURITY: WITHIN THE PAST 12 MONTHS, THE FOOD YOU BOUGHT JUST DIDN'T LAST AND YOU DIDN'T HAVE MONEY TO GET MORE.: NEVER TRUE

## 2023-06-13 SDOH — ECONOMIC STABILITY: INCOME INSECURITY: HOW HARD IS IT FOR YOU TO PAY FOR THE VERY BASICS LIKE FOOD, HOUSING, MEDICAL CARE, AND HEATING?: NOT HARD AT ALL

## 2023-06-13 ASSESSMENT — PATIENT HEALTH QUESTIONNAIRE - PHQ9
5. POOR APPETITE OR OVEREATING: 0
SUM OF ALL RESPONSES TO PHQ9 QUESTIONS 1 & 2: 0
8. MOVING OR SPEAKING SO SLOWLY THAT OTHER PEOPLE COULD HAVE NOTICED. OR THE OPPOSITE, BEING SO FIGETY OR RESTLESS THAT YOU HAVE BEEN MOVING AROUND A LOT MORE THAN USUAL: 0
SUM OF ALL RESPONSES TO PHQ QUESTIONS 1-9: 3
1. LITTLE INTEREST OR PLEASURE IN DOING THINGS: 0
7. TROUBLE CONCENTRATING ON THINGS, SUCH AS READING THE NEWSPAPER OR WATCHING TELEVISION: 0
SUM OF ALL RESPONSES TO PHQ QUESTIONS 1-9: 3
3. TROUBLE FALLING OR STAYING ASLEEP: 1
6. FEELING BAD ABOUT YOURSELF - OR THAT YOU ARE A FAILURE OR HAVE LET YOURSELF OR YOUR FAMILY DOWN: 0
SUM OF ALL RESPONSES TO PHQ QUESTIONS 1-9: 3
9. THOUGHTS THAT YOU WOULD BE BETTER OFF DEAD, OR OF HURTING YOURSELF: 0
10. IF YOU CHECKED OFF ANY PROBLEMS, HOW DIFFICULT HAVE THESE PROBLEMS MADE IT FOR YOU TO DO YOUR WORK, TAKE CARE OF THINGS AT HOME, OR GET ALONG WITH OTHER PEOPLE: 0
SUM OF ALL RESPONSES TO PHQ QUESTIONS 1-9: 3
4. FEELING TIRED OR HAVING LITTLE ENERGY: 2
2. FEELING DOWN, DEPRESSED OR HOPELESS: 0

## 2023-06-19 ASSESSMENT — ENCOUNTER SYMPTOMS
SORE THROAT: 0
ABDOMINAL PAIN: 0
DIARRHEA: 0
SINUS PRESSURE: 0
CONSTIPATION: 0
RHINORRHEA: 0
SINUS PAIN: 0
BLOOD IN STOOL: 0
VOMITING: 0
CHEST TIGHTNESS: 0
NAUSEA: 0
SHORTNESS OF BREATH: 0
WHEEZING: 0
COUGH: 0
EYE PAIN: 0
BACK PAIN: 1
EYE REDNESS: 0

## 2023-07-17 ENCOUNTER — COMMUNITY OUTREACH (OUTPATIENT)
Dept: PRIMARY CARE CLINIC | Facility: CLINIC | Age: 68
End: 2023-07-17

## 2023-09-01 ENCOUNTER — TELEPHONE (OUTPATIENT)
Dept: INTERNAL MEDICINE CLINIC | Facility: CLINIC | Age: 68
End: 2023-09-01

## 2023-09-01 PROBLEM — N32.81 OVERACTIVE BLADDER: Status: ACTIVE | Noted: 2021-11-17

## 2023-09-01 PROBLEM — G95.9 CERVICAL MYELOPATHY WITH CERVICAL RADICULOPATHY (HCC): Status: ACTIVE | Noted: 2017-01-11

## 2023-09-01 PROBLEM — N95.9 MENOPAUSAL AND POSTMENOPAUSAL DISORDER: Status: ACTIVE | Noted: 2021-11-17

## 2023-09-01 PROBLEM — M48.02 CERVICAL STENOSIS OF SPINE: Status: ACTIVE | Noted: 2017-07-19

## 2023-09-01 PROBLEM — J44.9 COPD (CHRONIC OBSTRUCTIVE PULMONARY DISEASE) (HCC): Status: ACTIVE | Noted: 2023-09-01

## 2023-09-01 PROBLEM — M54.12 CERVICAL MYELOPATHY WITH CERVICAL RADICULOPATHY (HCC): Status: ACTIVE | Noted: 2017-01-11

## 2023-09-01 PROBLEM — U07.1 DISEASE DUE TO SEVERE ACUTE RESPIRATORY SYNDROME CORONAVIRUS 2 (SARS-COV-2): Status: ACTIVE | Noted: 2023-09-01

## 2023-09-01 PROBLEM — E55.9 VITAMIN D DEFICIENCY: Status: ACTIVE | Noted: 2023-09-01

## 2023-09-19 ENCOUNTER — TELEPHONE (OUTPATIENT)
Dept: OBGYN CLINIC | Age: 68
End: 2023-09-19

## 2023-09-20 ENCOUNTER — OFFICE VISIT (OUTPATIENT)
Dept: OBGYN CLINIC | Age: 68
End: 2023-09-20
Payer: MEDICARE

## 2023-09-20 VITALS
DIASTOLIC BLOOD PRESSURE: 80 MMHG | HEIGHT: 63 IN | WEIGHT: 166 LBS | SYSTOLIC BLOOD PRESSURE: 142 MMHG | BODY MASS INDEX: 29.41 KG/M2

## 2023-09-20 DIAGNOSIS — N89.8 VAGINAL DISCHARGE: ICD-10-CM

## 2023-09-20 DIAGNOSIS — R93.89 ENDOMETRIAL THICKENING ON ULTRASOUND: Primary | ICD-10-CM

## 2023-09-20 PROCEDURE — G8428 CUR MEDS NOT DOCUMENT: HCPCS | Performed by: OBSTETRICS & GYNECOLOGY

## 2023-09-20 PROCEDURE — G8417 CALC BMI ABV UP PARAM F/U: HCPCS | Performed by: OBSTETRICS & GYNECOLOGY

## 2023-09-20 PROCEDURE — G8399 PT W/DXA RESULTS DOCUMENT: HCPCS | Performed by: OBSTETRICS & GYNECOLOGY

## 2023-09-20 PROCEDURE — 1090F PRES/ABSN URINE INCON ASSESS: CPT | Performed by: OBSTETRICS & GYNECOLOGY

## 2023-09-20 PROCEDURE — 1123F ACP DISCUSS/DSCN MKR DOCD: CPT | Performed by: OBSTETRICS & GYNECOLOGY

## 2023-09-20 PROCEDURE — 76830 TRANSVAGINAL US NON-OB: CPT | Performed by: OBSTETRICS & GYNECOLOGY

## 2023-09-20 PROCEDURE — 3074F SYST BP LT 130 MM HG: CPT | Performed by: OBSTETRICS & GYNECOLOGY

## 2023-09-20 PROCEDURE — 3078F DIAST BP <80 MM HG: CPT | Performed by: OBSTETRICS & GYNECOLOGY

## 2023-09-20 PROCEDURE — 3017F COLORECTAL CA SCREEN DOC REV: CPT | Performed by: OBSTETRICS & GYNECOLOGY

## 2023-09-20 PROCEDURE — 99213 OFFICE O/P EST LOW 20 MIN: CPT | Performed by: OBSTETRICS & GYNECOLOGY

## 2023-09-20 PROCEDURE — 4004F PT TOBACCO SCREEN RCVD TLK: CPT | Performed by: OBSTETRICS & GYNECOLOGY

## 2023-09-20 RX ORDER — FLUOXETINE HYDROCHLORIDE 40 MG/1
CAPSULE ORAL DAILY
COMMUNITY
Start: 2023-09-05

## 2023-09-20 NOTE — PROGRESS NOTES
POC US, TRANSVAGINAL      2. Vaginal discharge  NuSwab Vaginitis Plus (VG+) with Canddia (Six Species)    NuSwab Vaginitis Plus (VG+) with Canddia (Six Species)          N85.9    Fluid in the endometrial cavity     R93.89  Endometrial thickening.            Miko Holt MD, Shy Citizen

## 2023-09-21 ENCOUNTER — TELEPHONE (OUTPATIENT)
Dept: PRIMARY CARE CLINIC | Facility: CLINIC | Age: 68
End: 2023-09-21

## 2023-09-21 NOTE — TELEPHONE ENCOUNTER
----- Message from Candida Biswas sent at 9/21/2023  9:04 AM EDT -----  Subject: Message to Provider    QUESTIONS  Information for Provider? IMPORTANT? Patient needs a call from one of the   nurses to go over her medications. She really needs assistance. Best times   to call are around 9 am and 4 pm. Thank you.  ---------------------------------------------------------------------------  --------------  Link NAVARRETE  [phone]; OK to leave message on voicemail  ---------------------------------------------------------------------------  --------------  SCRIPT ANSWERS  Relationship to Patient?  Self

## 2023-10-04 ENCOUNTER — PREP FOR PROCEDURE (OUTPATIENT)
Dept: OBGYN CLINIC | Age: 68
End: 2023-10-04

## 2023-10-04 DIAGNOSIS — R93.89 THICKENED ENDOMETRIUM: ICD-10-CM

## 2023-10-06 ENCOUNTER — HOSPITAL ENCOUNTER (EMERGENCY)
Age: 68
Discharge: HOME | End: 2023-10-06
Payer: MEDICARE

## 2023-10-06 ENCOUNTER — APPOINTMENT (OUTPATIENT)
Dept: GENERAL RADIOLOGY | Age: 68
End: 2023-10-06
Payer: MEDICARE

## 2023-10-06 VITALS
SYSTOLIC BLOOD PRESSURE: 141 MMHG | HEIGHT: 64 IN | BODY MASS INDEX: 28.34 KG/M2 | TEMPERATURE: 98.4 F | OXYGEN SATURATION: 98 % | HEART RATE: 59 BPM | WEIGHT: 166 LBS | DIASTOLIC BLOOD PRESSURE: 70 MMHG | RESPIRATION RATE: 18 BRPM

## 2023-10-06 DIAGNOSIS — M62.838 CERVICAL PARASPINAL MUSCLE SPASM: ICD-10-CM

## 2023-10-06 DIAGNOSIS — M54.12 CERVICAL RADICULITIS: Primary | ICD-10-CM

## 2023-10-06 PROCEDURE — 72040 X-RAY EXAM NECK SPINE 2-3 VW: CPT

## 2023-10-06 PROCEDURE — 6360000002 HC RX W HCPCS

## 2023-10-06 PROCEDURE — 99284 EMERGENCY DEPT VISIT MOD MDM: CPT

## 2023-10-06 PROCEDURE — 96372 THER/PROPH/DIAG INJ SC/IM: CPT

## 2023-10-06 RX ORDER — METHOCARBAMOL 750 MG/1
750 TABLET, FILM COATED ORAL 4 TIMES DAILY
Qty: 20 TABLET | Refills: 0 | Status: SHIPPED | OUTPATIENT
Start: 2023-10-06 | End: 2023-10-11

## 2023-10-06 RX ORDER — KETOROLAC TROMETHAMINE 30 MG/ML
30 INJECTION, SOLUTION INTRAMUSCULAR; INTRAVENOUS
Status: COMPLETED | OUTPATIENT
Start: 2023-10-06 | End: 2023-10-06

## 2023-10-06 RX ORDER — DEXAMETHASONE SODIUM PHOSPHATE 10 MG/ML
8 INJECTION INTRAMUSCULAR; INTRAVENOUS
Status: COMPLETED | OUTPATIENT
Start: 2023-10-06 | End: 2023-10-06

## 2023-10-06 RX ORDER — METHYLPREDNISOLONE 4 MG/1
TABLET ORAL
Qty: 1 KIT | Refills: 0 | Status: SHIPPED | OUTPATIENT
Start: 2023-10-06 | End: 2023-10-12

## 2023-10-06 RX ADMIN — DEXAMETHASONE SODIUM PHOSPHATE 8 MG: 10 INJECTION INTRAMUSCULAR; INTRAVENOUS at 17:56

## 2023-10-06 RX ADMIN — KETOROLAC TROMETHAMINE 30 MG: 30 INJECTION, SOLUTION INTRAMUSCULAR; INTRAVENOUS at 17:56

## 2023-10-06 ASSESSMENT — LIFESTYLE VARIABLES
HOW OFTEN DO YOU HAVE A DRINK CONTAINING ALCOHOL: MONTHLY OR LESS
HOW MANY STANDARD DRINKS CONTAINING ALCOHOL DO YOU HAVE ON A TYPICAL DAY: 1 OR 2

## 2023-10-06 ASSESSMENT — PAIN - FUNCTIONAL ASSESSMENT: PAIN_FUNCTIONAL_ASSESSMENT: 0-10

## 2023-10-06 ASSESSMENT — PAIN SCALES - GENERAL
PAINLEVEL_OUTOF10: 10
PAINLEVEL_OUTOF10: 10

## 2023-10-06 ASSESSMENT — ENCOUNTER SYMPTOMS
VOMITING: 0
NAUSEA: 0
CHEST TIGHTNESS: 0
SHORTNESS OF BREATH: 0
COLOR CHANGE: 0
ABDOMINAL PAIN: 0
DIARRHEA: 0

## 2023-10-06 ASSESSMENT — PAIN DESCRIPTION - LOCATION
LOCATION: NECK
LOCATION: NECK

## 2023-10-06 NOTE — ED TRIAGE NOTES
Pt states that she woke up this morning with the right side of neck hurting.   Denies any injury or trauma to the neck

## 2023-10-06 NOTE — DISCHARGE INSTRUCTIONS
X-ray did reveal some degenerative changes of the cervical spine, most particularly at C5 and C6 as discussed. Your radiating pain is almost certainly related to a compressed nerve. There appears to also be some overlap with cervical muscular spasms/tension. I provided a prescription for steroids and muscle relaxers which will likely help with your pain and tension. Recommend heat and range of motion exercises of the neck and shoulder as well. Please return with any worsening symptoms or concerns. Otherwise, plan to follow-up with your primary doctor.

## 2023-10-09 ENCOUNTER — CARE COORDINATION (OUTPATIENT)
Dept: CARE COORDINATION | Facility: CLINIC | Age: 68
End: 2023-10-09

## 2023-10-27 ENCOUNTER — TELEMEDICINE (OUTPATIENT)
Dept: INTERNAL MEDICINE CLINIC | Facility: CLINIC | Age: 68
End: 2023-10-27
Payer: MEDICARE

## 2023-10-27 DIAGNOSIS — J43.8 OTHER EMPHYSEMA (HCC): ICD-10-CM

## 2023-10-27 DIAGNOSIS — G95.9 CERVICAL MYELOPATHY WITH CERVICAL RADICULOPATHY (HCC): ICD-10-CM

## 2023-10-27 DIAGNOSIS — E78.00 HYPERCHOLESTEREMIA: ICD-10-CM

## 2023-10-27 DIAGNOSIS — Z76.89 ENCOUNTER TO ESTABLISH CARE WITH NEW DOCTOR: Primary | ICD-10-CM

## 2023-10-27 DIAGNOSIS — K63.5 POLYP OF COLON, UNSPECIFIED PART OF COLON, UNSPECIFIED TYPE: ICD-10-CM

## 2023-10-27 DIAGNOSIS — I10 PRIMARY HYPERTENSION: ICD-10-CM

## 2023-10-27 DIAGNOSIS — R79.9 ABNORMAL BLOOD CHEMISTRY: ICD-10-CM

## 2023-10-27 DIAGNOSIS — F17.200 TOBACCO DEPENDENCE SYNDROME: ICD-10-CM

## 2023-10-27 DIAGNOSIS — E55.9 VITAMIN D DEFICIENCY: ICD-10-CM

## 2023-10-27 DIAGNOSIS — M85.89 OSTEOPENIA OF MULTIPLE SITES: ICD-10-CM

## 2023-10-27 DIAGNOSIS — M54.12 CERVICAL MYELOPATHY WITH CERVICAL RADICULOPATHY (HCC): ICD-10-CM

## 2023-10-27 DIAGNOSIS — F41.9 ANXIETY AND DEPRESSION: ICD-10-CM

## 2023-10-27 DIAGNOSIS — F32.A ANXIETY AND DEPRESSION: ICD-10-CM

## 2023-10-27 DIAGNOSIS — R93.89 THICKENED ENDOMETRIUM: ICD-10-CM

## 2023-10-27 DIAGNOSIS — Z11.59 ENCOUNTER FOR HEPATITIS C SCREENING TEST FOR LOW RISK PATIENT: ICD-10-CM

## 2023-10-27 DIAGNOSIS — G40.909 SEIZURE DISORDER (HCC): ICD-10-CM

## 2023-10-27 PROBLEM — J45.40 MODERATE PERSISTENT ASTHMA: Status: RESOLVED | Noted: 2023-04-28 | Resolved: 2023-10-27

## 2023-10-27 PROCEDURE — 1090F PRES/ABSN URINE INCON ASSESS: CPT | Performed by: INTERNAL MEDICINE

## 2023-10-27 PROCEDURE — 3017F COLORECTAL CA SCREEN DOC REV: CPT | Performed by: INTERNAL MEDICINE

## 2023-10-27 PROCEDURE — 99215 OFFICE O/P EST HI 40 MIN: CPT | Performed by: INTERNAL MEDICINE

## 2023-10-27 PROCEDURE — G8484 FLU IMMUNIZE NO ADMIN: HCPCS | Performed by: INTERNAL MEDICINE

## 2023-10-27 PROCEDURE — G8399 PT W/DXA RESULTS DOCUMENT: HCPCS | Performed by: INTERNAL MEDICINE

## 2023-10-27 PROCEDURE — G8417 CALC BMI ABV UP PARAM F/U: HCPCS | Performed by: INTERNAL MEDICINE

## 2023-10-27 PROCEDURE — 4004F PT TOBACCO SCREEN RCVD TLK: CPT | Performed by: INTERNAL MEDICINE

## 2023-10-27 PROCEDURE — 3023F SPIROM DOC REV: CPT | Performed by: INTERNAL MEDICINE

## 2023-10-27 PROCEDURE — G8427 DOCREV CUR MEDS BY ELIG CLIN: HCPCS | Performed by: INTERNAL MEDICINE

## 2023-10-27 PROCEDURE — 1123F ACP DISCUSS/DSCN MKR DOCD: CPT | Performed by: INTERNAL MEDICINE

## 2023-10-27 RX ORDER — METHOCARBAMOL 750 MG/1
750 TABLET, FILM COATED ORAL 4 TIMES DAILY
Qty: 40 TABLET | Refills: 2 | Status: SHIPPED | OUTPATIENT
Start: 2023-10-27 | End: 2023-11-06

## 2023-10-27 ASSESSMENT — ENCOUNTER SYMPTOMS: RESPIRATORY NEGATIVE: 1

## 2023-10-27 NOTE — PROGRESS NOTES
Yes     Comment: rarely    Drug use: Never                 Current Outpatient Medications:     methocarbamol (ROBAXIN) 750 MG tablet, Take 1 tablet by mouth 4 times daily for 10 days, Disp: 40 tablet, Rfl: 2    FLUoxetine (PROZAC) 40 MG capsule, Take by mouth daily, Disp: , Rfl:     fluticasone-salmeterol (ADVAIR DISKUS) 250-50 MCG/ACT AEPB diskus inhaler, Inhale 1 puff twice a day by inhalation route for 30 days. , Disp: , Rfl:     atorvastatin (LIPITOR) 40 MG tablet, Take 1 tablet by mouth daily, Disp: , Rfl:     fexofenadine (ALLEGRA) 180 MG tablet, Take 1 tablet by mouth daily, Disp: 90 tablet, Rfl: 1    albuterol sulfate HFA (PROVENTIL;VENTOLIN;PROAIR) 108 (90 Base) MCG/ACT inhaler, INHALE 2 PUFFS EVERY 4 TO 6 HOURS AS NEEDED, Disp: , Rfl:     carvedilol (COREG) 12.5 MG tablet, TAKE 1 TABLET BY MOUTH TWICE A DAY, Disp: , Rfl:     lisinopril (PRINIVIL;ZESTRIL) 40 MG tablet, Take 1 tablet by mouth daily, Disp: , Rfl:     zolpidem (AMBIEN) 10 MG tablet, Take 1 tablet by mouth nightly as needed. , Disp: , Rfl:     Allergies   Allergen Reactions    Adhesive Tape Rash    Citalopram Other (See Comments)    Contrast [Iodides] Hives    Iodinated Contrast Media Hives    Ioversol Other (See Comments)       Review of Systems   Constitutional: Negative. HENT: Negative. Respiratory: Negative. Cardiovascular: Negative. Objective:  LMP  (LMP Unknown)     Examination:  Physical Exam  Neurological:      Mental Status: She is alert. Psychiatric:         Thought Content: Thought content normal.         Judgment: Judgment normal.           Assessment/Plan:    Tarik Turk was seen today for establish care. Diagnoses and all orders for this visit:    Encounter to establish care with new doctor  Patient's medical history was reviewed in detail today. Recommended healthy diet, regular exercise.      Primary hypertension  Recommended low sodium diet; Goal: take meds as prescribed, monitor blood pressure at home and call

## 2023-11-01 ENCOUNTER — OFFICE VISIT (OUTPATIENT)
Dept: INTERNAL MEDICINE CLINIC | Facility: CLINIC | Age: 68
End: 2023-11-01
Payer: MEDICARE

## 2023-11-01 VITALS
BODY MASS INDEX: 28.85 KG/M2 | OXYGEN SATURATION: 99 % | WEIGHT: 169 LBS | HEART RATE: 54 BPM | SYSTOLIC BLOOD PRESSURE: 140 MMHG | TEMPERATURE: 98.1 F | HEIGHT: 64 IN | DIASTOLIC BLOOD PRESSURE: 80 MMHG

## 2023-11-01 DIAGNOSIS — J00 ACUTE NASOPHARYNGITIS: Primary | ICD-10-CM

## 2023-11-01 PROCEDURE — 1123F ACP DISCUSS/DSCN MKR DOCD: CPT | Performed by: INTERNAL MEDICINE

## 2023-11-01 PROCEDURE — 3077F SYST BP >= 140 MM HG: CPT | Performed by: INTERNAL MEDICINE

## 2023-11-01 PROCEDURE — 3017F COLORECTAL CA SCREEN DOC REV: CPT | Performed by: INTERNAL MEDICINE

## 2023-11-01 PROCEDURE — G8484 FLU IMMUNIZE NO ADMIN: HCPCS | Performed by: INTERNAL MEDICINE

## 2023-11-01 PROCEDURE — 1090F PRES/ABSN URINE INCON ASSESS: CPT | Performed by: INTERNAL MEDICINE

## 2023-11-01 PROCEDURE — 99213 OFFICE O/P EST LOW 20 MIN: CPT | Performed by: INTERNAL MEDICINE

## 2023-11-01 PROCEDURE — 4004F PT TOBACCO SCREEN RCVD TLK: CPT | Performed by: INTERNAL MEDICINE

## 2023-11-01 PROCEDURE — 3079F DIAST BP 80-89 MM HG: CPT | Performed by: INTERNAL MEDICINE

## 2023-11-01 PROCEDURE — G8417 CALC BMI ABV UP PARAM F/U: HCPCS | Performed by: INTERNAL MEDICINE

## 2023-11-01 PROCEDURE — G8399 PT W/DXA RESULTS DOCUMENT: HCPCS | Performed by: INTERNAL MEDICINE

## 2023-11-01 PROCEDURE — G8427 DOCREV CUR MEDS BY ELIG CLIN: HCPCS | Performed by: INTERNAL MEDICINE

## 2023-11-01 RX ORDER — METHYLPREDNISOLONE 4 MG/1
TABLET ORAL
Qty: 1 KIT | Refills: 0 | Status: SHIPPED | OUTPATIENT
Start: 2023-11-01 | End: 2023-11-07

## 2023-11-01 RX ORDER — FLUCONAZOLE 150 MG/1
150 TABLET ORAL ONCE
Qty: 1 TABLET | Refills: 0 | Status: SHIPPED | OUTPATIENT
Start: 2023-11-01 | End: 2023-11-01

## 2023-11-01 RX ORDER — AMOXICILLIN AND CLAVULANATE POTASSIUM 875; 125 MG/1; MG/1
1 TABLET, FILM COATED ORAL 2 TIMES DAILY
Qty: 20 TABLET | Refills: 0 | Status: SHIPPED | OUTPATIENT
Start: 2023-11-01 | End: 2023-11-11

## 2023-11-01 ASSESSMENT — PATIENT HEALTH QUESTIONNAIRE - PHQ9
SUM OF ALL RESPONSES TO PHQ9 QUESTIONS 1 & 2: 0
1. LITTLE INTEREST OR PLEASURE IN DOING THINGS: 0
2. FEELING DOWN, DEPRESSED OR HOPELESS: 0
SUM OF ALL RESPONSES TO PHQ QUESTIONS 1-9: 0

## 2023-11-01 ASSESSMENT — ENCOUNTER SYMPTOMS
NAUSEA: 0
CHEST TIGHTNESS: 0
VOMITING: 0
SINUS PAIN: 1
DIARRHEA: 0
COUGH: 0
SHORTNESS OF BREATH: 0
RHINORRHEA: 1
BLOOD IN STOOL: 0
ABDOMINAL PAIN: 0

## 2023-11-01 NOTE — PROGRESS NOTES
[facility]      2023    Patient Name: Jessica Gonzalez  :  1955    Subjective:    Chief Complaint:  Chief Complaint   Patient presents with    Jaw Pain     Pt c/o R sided jaw pain, swelling, and some pain when swallowing for the past few days.           HPI  c/o pain on the R side of her head radiating to her jaw and R side of her cheek and under her jaw; she is wondering if she has a knot under her R jaw; she has pain talking x2 days; she denies fever, chills; she had had some SOB and cough off and on; she's had similar episode in the past; she has chronic sinus drainage; she has tried Aleve cold and sinus, with minimal relief;     Past Medical History:   Diagnosis Date    Allergic rhinitis     Anxiety and depression     Cervical radiculopathy     COPD (chronic obstructive pulmonary disease) ([address])     High cholesterol     History of bone density study 2023    osteopenia    History of mammogram 2023    History of Papanicolaou smear of cervix 2023    History of seizures     Hypertension     Osteopenia        Past Surgical History:   Procedure Laterality Date    BUNIONECTOMY Right      SECTION      x3    COLONOSCOPY  2016    s/p polypectomy    WRIST SURGERY Left        Family History   Problem Relation Age of Onset    Stroke Mother     Hypertension Mother     No Known Problems Father     Hypertension Son         Both sons    Pseudotumor Cerebri Child     Breast Cancer Neg Hx        Social History     Tobacco Use    Smoking status: Every Day     Packs/day: 1.00     Years: 40.00     Additional pack years: 0.00     Total pack years: 40.00     Types: Cigarettes     Passive exposure: Never    Smokeless tobacco: Never   Vaping Use    Vaping Use: Never used   Substance Use Topics    Alcohol use: Yes     Comment: rarely    Drug use: Never                 Current Outpatient Medications:     amoxicillin-clavulanate (AUGMENTIN) 875-125 MG per tablet, Take 1 tablet by mouth 2 times daily for 10

## 2023-12-03 ENCOUNTER — CLINICAL DOCUMENTATION (OUTPATIENT)
Dept: OBGYN CLINIC | Age: 68
End: 2023-12-03

## 2023-12-03 DIAGNOSIS — N88.2 CERVICAL STENOSIS (UTERINE CERVIX): Primary | ICD-10-CM

## 2023-12-03 RX ORDER — MISOPROSTOL 200 UG/1
TABLET ORAL
Qty: 4 TABLET | Refills: 0 | Status: SHIPPED | OUTPATIENT
Start: 2023-12-03

## 2023-12-05 ENCOUNTER — OFFICE VISIT (OUTPATIENT)
Dept: OBGYN CLINIC | Age: 68
End: 2023-12-05

## 2023-12-05 VITALS
DIASTOLIC BLOOD PRESSURE: 82 MMHG | WEIGHT: 166 LBS | HEIGHT: 64 IN | SYSTOLIC BLOOD PRESSURE: 128 MMHG | BODY MASS INDEX: 28.34 KG/M2

## 2023-12-05 DIAGNOSIS — Z01.812 PRE-PROCEDURE LAB EXAM: ICD-10-CM

## 2023-12-05 DIAGNOSIS — N76.0 BV (BACTERIAL VAGINOSIS): ICD-10-CM

## 2023-12-05 DIAGNOSIS — B96.89 BV (BACTERIAL VAGINOSIS): ICD-10-CM

## 2023-12-05 DIAGNOSIS — N89.8 VAGINAL ODOR: ICD-10-CM

## 2023-12-05 DIAGNOSIS — R93.89 ENDOMETRIAL THICKENING ON ULTRASOUND: Primary | ICD-10-CM

## 2023-12-05 DIAGNOSIS — G89.18 POST-OP PAIN: ICD-10-CM

## 2023-12-05 DIAGNOSIS — N88.2 CERVICAL STENOSIS (UTERINE CERVIX): ICD-10-CM

## 2023-12-05 DIAGNOSIS — N89.8 VAGINAL IRRITATION: ICD-10-CM

## 2023-12-05 DIAGNOSIS — N89.8 VAGINAL DISCHARGE: ICD-10-CM

## 2023-12-05 LAB
ALBUMIN SERPL-MCNC: 3.9 G/DL (ref 3.2–4.6)
ALBUMIN/GLOB SERPL: 1.4 (ref 0.4–1.6)
ALP SERPL-CCNC: 108 U/L (ref 50–136)
ALT SERPL-CCNC: 21 U/L (ref 12–65)
ANION GAP SERPL CALC-SCNC: 4 MMOL/L (ref 2–11)
AST SERPL-CCNC: 15 U/L (ref 15–37)
BASOPHILS # BLD: 0 K/UL (ref 0–0.2)
BASOPHILS NFR BLD: 0 % (ref 0–2)
BILIRUB SERPL-MCNC: 0.4 MG/DL (ref 0.2–1.1)
BUN SERPL-MCNC: 12 MG/DL (ref 8–23)
CALCIUM SERPL-MCNC: 9.4 MG/DL (ref 8.3–10.4)
CHLORIDE SERPL-SCNC: 109 MMOL/L (ref 103–113)
CO2 SERPL-SCNC: 28 MMOL/L (ref 21–32)
CREAT SERPL-MCNC: 0.9 MG/DL (ref 0.6–1)
DIFFERENTIAL METHOD BLD: NORMAL
EOSINOPHIL # BLD: 0.1 K/UL (ref 0–0.8)
EOSINOPHIL NFR BLD: 1 % (ref 0.5–7.8)
ERYTHROCYTE [DISTWIDTH] IN BLOOD BY AUTOMATED COUNT: 13.6 % (ref 11.9–14.6)
GLOBULIN SER CALC-MCNC: 2.8 G/DL (ref 2.8–4.5)
GLUCOSE SERPL-MCNC: 119 MG/DL (ref 65–100)
HCT VFR BLD AUTO: 38.8 % (ref 35.8–46.3)
HGB BLD-MCNC: 12.5 G/DL (ref 11.7–15.4)
IMM GRANULOCYTES # BLD AUTO: 0.1 K/UL (ref 0–0.5)
IMM GRANULOCYTES NFR BLD AUTO: 1 % (ref 0–5)
LYMPHOCYTES # BLD: 3.1 K/UL (ref 0.5–4.6)
LYMPHOCYTES NFR BLD: 31 % (ref 13–44)
MCH RBC QN AUTO: 30.7 PG (ref 26.1–32.9)
MCHC RBC AUTO-ENTMCNC: 32.2 G/DL (ref 31.4–35)
MCV RBC AUTO: 95.3 FL (ref 82–102)
MONOCYTES # BLD: 0.6 K/UL (ref 0.1–1.3)
MONOCYTES NFR BLD: 6 % (ref 4–12)
NEUTS SEG # BLD: 6 K/UL (ref 1.7–8.2)
NEUTS SEG NFR BLD: 61 % (ref 43–78)
NRBC # BLD: 0 K/UL (ref 0–0.2)
PLATELET # BLD AUTO: 275 K/UL (ref 150–450)
PMV BLD AUTO: 10.2 FL (ref 9.4–12.3)
POTASSIUM SERPL-SCNC: 4 MMOL/L (ref 3.5–5.1)
PROT SERPL-MCNC: 6.7 G/DL (ref 6.3–8.2)
RBC # BLD AUTO: 4.07 M/UL (ref 4.05–5.2)
SODIUM SERPL-SCNC: 141 MMOL/L (ref 136–146)
WBC # BLD AUTO: 9.8 K/UL (ref 4.3–11.1)

## 2023-12-05 RX ORDER — OXYCODONE HYDROCHLORIDE 5 MG/1
5 TABLET ORAL EVERY 6 HOURS PRN
Qty: 12 TABLET | Refills: 0 | Status: SHIPPED | OUTPATIENT
Start: 2023-12-05 | End: 2023-12-08

## 2023-12-05 RX ORDER — MISOPROSTOL 200 UG/1
TABLET ORAL
Qty: 3 TABLET | Refills: 0 | Status: ON HOLD | OUTPATIENT
Start: 2023-12-05 | End: 2023-12-08 | Stop reason: HOSPADM

## 2023-12-05 NOTE — PROGRESS NOTES
Patient verified name and . Order for consent not found in EHR; patient verifies procedure. Type 1b surgery, Phone assessment complete. Orders not received. Labs per surgeon: none  Labs per anesthesia protocol: none    Echo 10/4/17 and stress 17 in Care Everywhere and EKG 23 in EMR if needed DOS. Patient answered medical/surgical history questions at their best of ability. All prior to admission medications documented in EPIC. Patient instructed to take the following medications the day of surgery according to anesthesia guidelines with a small sip of water: Advair, Coreg, Allegra. Bring Albuterol inhaler to hospital.     Hold all vitamins 7 days prior to surgery and NSAIDS 5 days prior to surgery. Prescription meds to hold:none    Patient instructed on the following:    > Arrive at [address] [place] [address], time of arrival to be called the day before by 1700  > NPO after midnight, unless otherwise indicated, including gum, mints, and ice chips  > Responsible adult must drive patient to the hospital, stay during surgery, and patient will need supervision 24 hours after anesthesia  > Use non moisturizing soap in shower the night before surgery and on the morning of surgery  > All piercings must be removed prior to arrival.    > Leave all valuables (money and jewelry) at home but bring insurance card and ID on DOS.   > Do not wear make-up, nail polish, lotions, cologne, perfumes, powders, or oil on skin. Artificial nails are not permitted.

## 2023-12-07 DIAGNOSIS — N76.0 BV (BACTERIAL VAGINOSIS): ICD-10-CM

## 2023-12-07 DIAGNOSIS — B96.89 BV (BACTERIAL VAGINOSIS): ICD-10-CM

## 2023-12-07 LAB
A VAGINAE DNA VAG QL NAA+PROBE: ABNORMAL SCORE
BVAB2 DNA VAG QL NAA+PROBE: ABNORMAL SCORE
C ALBICANS DNA VAG QL NAA+PROBE: NEGATIVE
C GLABRATA DNA VAG QL NAA+PROBE: NEGATIVE
C TRACH RRNA SPEC QL NAA+PROBE: NEGATIVE
CANDIDA KRUSEI: NEGATIVE
CANDIDA LUSITANIAE, NAA: NEGATIVE
CANDIDA PARAPSILOSIS/TROPICALIS: NEGATIVE
MEGA1 DNA VAG QL NAA+PROBE: ABNORMAL SCORE
N GONORRHOEA RRNA SPEC QL NAA+PROBE: NEGATIVE
T VAGINALIS RRNA SPEC QL NAA+PROBE: NEGATIVE

## 2023-12-07 RX ORDER — METRONIDAZOLE 500 MG/1
500 TABLET ORAL 2 TIMES DAILY
Qty: 14 TABLET | Refills: 0 | Status: SHIPPED | OUTPATIENT
Start: 2023-12-07

## 2023-12-07 RX ORDER — METRONIDAZOLE 500 MG/1
500 TABLET ORAL 2 TIMES DAILY
Qty: 14 TABLET | Refills: 0 | Status: SHIPPED | OUTPATIENT
Start: 2023-12-07 | End: 2023-12-07 | Stop reason: SDUPTHER

## 2023-12-07 NOTE — TELEPHONE ENCOUNTER
Flagyl called in for pt to treat BV. Med sent to wrong pharmacy, please re-sent to [facility] on [facility].  Thank you

## 2023-12-08 ENCOUNTER — HOSPITAL ENCOUNTER (OUTPATIENT)
Age: 68
Discharge: HOME | End: 2023-12-08
Attending: OBSTETRICS & GYNECOLOGY | Admitting: OBSTETRICS & GYNECOLOGY
Payer: MEDICARE

## 2023-12-08 ENCOUNTER — ANESTHESIA (OUTPATIENT)
Dept: SURGERY | Age: 68
End: 2023-12-08
Payer: MEDICARE

## 2023-12-08 ENCOUNTER — ANESTHESIA EVENT (OUTPATIENT)
Dept: SURGERY | Age: 68
End: 2023-12-08
Payer: MEDICARE

## 2023-12-08 VITALS
HEART RATE: 84 BPM | OXYGEN SATURATION: 96 % | SYSTOLIC BLOOD PRESSURE: 162 MMHG | HEIGHT: 64 IN | BODY MASS INDEX: 28.07 KG/M2 | WEIGHT: 164.4 LBS | RESPIRATION RATE: 18 BRPM | TEMPERATURE: 98.1 F | DIASTOLIC BLOOD PRESSURE: 87 MMHG

## 2023-12-08 PROBLEM — R93.89 ENDOMETRIAL THICKENING ON ULTRASOUND: Status: ACTIVE | Noted: 2023-12-08

## 2023-12-08 LAB
ABO + RH BLD: NORMAL
BACTERIA SPEC CULT: NORMAL
BACTERIA SPEC CULT: NORMAL
BLOOD GROUP ANTIBODIES SERPL: NORMAL
SERVICE CMNT-IMP: NORMAL
SPECIMEN EXP DATE BLD: NORMAL

## 2023-12-08 PROCEDURE — 2720000010 HC SURG SUPPLY STERILE: Performed by: OBSTETRICS & GYNECOLOGY

## 2023-12-08 PROCEDURE — 3700000001 HC ADD 15 MINUTES (ANESTHESIA): Performed by: OBSTETRICS & GYNECOLOGY

## 2023-12-08 PROCEDURE — 2709999900 HC NON-CHARGEABLE SUPPLY: Performed by: OBSTETRICS & GYNECOLOGY

## 2023-12-08 PROCEDURE — 7100000001 HC PACU RECOVERY - ADDTL 15 MIN: Performed by: OBSTETRICS & GYNECOLOGY

## 2023-12-08 PROCEDURE — 2580000003 HC RX 258: Performed by: ANESTHESIOLOGY

## 2023-12-08 PROCEDURE — 6360000002 HC RX W HCPCS: Performed by: REGISTERED NURSE

## 2023-12-08 PROCEDURE — 3600000013 HC SURGERY LEVEL 3 ADDTL 15MIN: Performed by: OBSTETRICS & GYNECOLOGY

## 2023-12-08 PROCEDURE — 6370000000 HC RX 637 (ALT 250 FOR IP): Performed by: ANESTHESIOLOGY

## 2023-12-08 PROCEDURE — 3700000000 HC ANESTHESIA ATTENDED CARE: Performed by: OBSTETRICS & GYNECOLOGY

## 2023-12-08 PROCEDURE — 2500000003 HC RX 250 WO HCPCS: Performed by: REGISTERED NURSE

## 2023-12-08 PROCEDURE — 58558 HYSTEROSCOPY BIOPSY: CPT | Performed by: OBSTETRICS & GYNECOLOGY

## 2023-12-08 PROCEDURE — 7100000011 HC PHASE II RECOVERY - ADDTL 15 MIN: Performed by: OBSTETRICS & GYNECOLOGY

## 2023-12-08 PROCEDURE — 86901 BLOOD TYPING SEROLOGIC RH(D): CPT

## 2023-12-08 PROCEDURE — 88305 TISSUE EXAM BY PATHOLOGIST: CPT

## 2023-12-08 PROCEDURE — 86850 RBC ANTIBODY SCREEN: CPT

## 2023-12-08 PROCEDURE — 86900 BLOOD TYPING SEROLOGIC ABO: CPT

## 2023-12-08 PROCEDURE — 7100000000 HC PACU RECOVERY - FIRST 15 MIN: Performed by: OBSTETRICS & GYNECOLOGY

## 2023-12-08 PROCEDURE — 3600000003 HC SURGERY LEVEL 3 BASE: Performed by: OBSTETRICS & GYNECOLOGY

## 2023-12-08 PROCEDURE — 7100000010 HC PHASE II RECOVERY - FIRST 15 MIN: Performed by: OBSTETRICS & GYNECOLOGY

## 2023-12-08 RX ORDER — NAPROXEN 500 MG/1
500 TABLET ORAL 2 TIMES DAILY PRN
Qty: 14 TABLET | Refills: 0 | Status: SHIPPED | OUTPATIENT
Start: 2023-12-08

## 2023-12-08 RX ORDER — FENTANYL CITRATE 50 UG/ML
100 INJECTION, SOLUTION INTRAMUSCULAR; INTRAVENOUS PRN
Status: DISCONTINUED | OUTPATIENT
Start: 2023-12-08 | End: 2023-12-08 | Stop reason: HOSPADM

## 2023-12-08 RX ORDER — MIDAZOLAM HYDROCHLORIDE 2 MG/2ML
2 INJECTION, SOLUTION INTRAMUSCULAR; INTRAVENOUS
Status: DISCONTINUED | OUTPATIENT
Start: 2023-12-08 | End: 2023-12-08 | Stop reason: HOSPADM

## 2023-12-08 RX ORDER — KETOROLAC TROMETHAMINE 30 MG/ML
INJECTION, SOLUTION INTRAMUSCULAR; INTRAVENOUS PRN
Status: DISCONTINUED | OUTPATIENT
Start: 2023-12-08 | End: 2023-12-08 | Stop reason: SDUPTHER

## 2023-12-08 RX ORDER — GLYCOPYRROLATE 0.2 MG/ML
INJECTION INTRAMUSCULAR; INTRAVENOUS PRN
Status: DISCONTINUED | OUTPATIENT
Start: 2023-12-08 | End: 2023-12-08 | Stop reason: SDUPTHER

## 2023-12-08 RX ORDER — PROPOFOL 10 MG/ML
INJECTION, EMULSION INTRAVENOUS PRN
Status: DISCONTINUED | OUTPATIENT
Start: 2023-12-08 | End: 2023-12-08 | Stop reason: SDUPTHER

## 2023-12-08 RX ORDER — CARVEDILOL 12.5 MG/1
12.5 TABLET ORAL ONCE
Status: COMPLETED | OUTPATIENT
Start: 2023-12-08 | End: 2023-12-08

## 2023-12-08 RX ORDER — FENTANYL CITRATE 50 UG/ML
50 INJECTION, SOLUTION INTRAMUSCULAR; INTRAVENOUS PRN
Status: DISCONTINUED | OUTPATIENT
Start: 2023-12-08 | End: 2023-12-08 | Stop reason: HOSPADM

## 2023-12-08 RX ORDER — ONDANSETRON 2 MG/ML
INJECTION INTRAMUSCULAR; INTRAVENOUS PRN
Status: DISCONTINUED | OUTPATIENT
Start: 2023-12-08 | End: 2023-12-08 | Stop reason: SDUPTHER

## 2023-12-08 RX ORDER — EPHEDRINE SULFATE/0.9% NACL/PF 50 MG/5 ML
SYRINGE (ML) INTRAVENOUS PRN
Status: DISCONTINUED | OUTPATIENT
Start: 2023-12-08 | End: 2023-12-08 | Stop reason: SDUPTHER

## 2023-12-08 RX ORDER — MIDAZOLAM HYDROCHLORIDE 1 MG/ML
INJECTION INTRAMUSCULAR; INTRAVENOUS PRN
Status: DISCONTINUED | OUTPATIENT
Start: 2023-12-08 | End: 2023-12-08 | Stop reason: SDUPTHER

## 2023-12-08 RX ORDER — LIDOCAINE HYDROCHLORIDE 10 MG/ML
1 INJECTION, SOLUTION INFILTRATION; PERINEURAL
Status: DISCONTINUED | OUTPATIENT
Start: 2023-12-08 | End: 2023-12-08 | Stop reason: HOSPADM

## 2023-12-08 RX ORDER — OXYCODONE HYDROCHLORIDE 5 MG/1
5 TABLET ORAL
Status: DISCONTINUED | OUTPATIENT
Start: 2023-12-08 | End: 2023-12-08 | Stop reason: HOSPADM

## 2023-12-08 RX ORDER — HYDROMORPHONE HYDROCHLORIDE 1 MG/ML
0.5 INJECTION, SOLUTION INTRAMUSCULAR; INTRAVENOUS; SUBCUTANEOUS EVERY 5 MIN PRN
Status: DISCONTINUED | OUTPATIENT
Start: 2023-12-08 | End: 2023-12-08 | Stop reason: HOSPADM

## 2023-12-08 RX ORDER — SODIUM CHLORIDE 0.9 % (FLUSH) 0.9 %
5-40 SYRINGE (ML) INJECTION EVERY 12 HOURS SCHEDULED
Status: DISCONTINUED | OUTPATIENT
Start: 2023-12-08 | End: 2023-12-08 | Stop reason: HOSPADM

## 2023-12-08 RX ORDER — ONDANSETRON 2 MG/ML
4 INJECTION INTRAMUSCULAR; INTRAVENOUS EVERY 6 HOURS PRN
Status: DISCONTINUED | OUTPATIENT
Start: 2023-12-08 | End: 2023-12-08 | Stop reason: HOSPADM

## 2023-12-08 RX ORDER — SODIUM CHLORIDE, SODIUM LACTATE, POTASSIUM CHLORIDE, CALCIUM CHLORIDE 600; 310; 30; 20 MG/100ML; MG/100ML; MG/100ML; MG/100ML
INJECTION, SOLUTION INTRAVENOUS CONTINUOUS
Status: DISCONTINUED | OUTPATIENT
Start: 2023-12-08 | End: 2023-12-08 | Stop reason: HOSPADM

## 2023-12-08 RX ORDER — SODIUM CHLORIDE 0.9 % (FLUSH) 0.9 %
5-40 SYRINGE (ML) INJECTION PRN
Status: DISCONTINUED | OUTPATIENT
Start: 2023-12-08 | End: 2023-12-08 | Stop reason: HOSPADM

## 2023-12-08 RX ORDER — ONDANSETRON 2 MG/ML
4 INJECTION INTRAMUSCULAR; INTRAVENOUS
Status: DISCONTINUED | OUTPATIENT
Start: 2023-12-08 | End: 2023-12-08 | Stop reason: HOSPADM

## 2023-12-08 RX ORDER — DEXAMETHASONE SODIUM PHOSPHATE 10 MG/ML
INJECTION INTRAMUSCULAR; INTRAVENOUS PRN
Status: DISCONTINUED | OUTPATIENT
Start: 2023-12-08 | End: 2023-12-08 | Stop reason: SDUPTHER

## 2023-12-08 RX ORDER — LIDOCAINE HYDROCHLORIDE 20 MG/ML
INJECTION, SOLUTION EPIDURAL; INFILTRATION; INTRACAUDAL; PERINEURAL PRN
Status: DISCONTINUED | OUTPATIENT
Start: 2023-12-08 | End: 2023-12-08 | Stop reason: SDUPTHER

## 2023-12-08 RX ORDER — ATROPINE SULFATE 0.4 MG/ML
INJECTION, SOLUTION INTRAMUSCULAR; INTRAVENOUS; SUBCUTANEOUS PRN
Status: DISCONTINUED | OUTPATIENT
Start: 2023-12-08 | End: 2023-12-08 | Stop reason: SDUPTHER

## 2023-12-08 RX ADMIN — PROPOFOL 200 MG: 10 INJECTION, EMULSION INTRAVENOUS at 08:55

## 2023-12-08 RX ADMIN — DEXAMETHASONE SODIUM PHOSPHATE 10 MG: 10 INJECTION INTRAMUSCULAR; INTRAVENOUS at 09:00

## 2023-12-08 RX ADMIN — ONDANSETRON 4 MG: 2 INJECTION INTRAMUSCULAR; INTRAVENOUS at 09:00

## 2023-12-08 RX ADMIN — SODIUM CHLORIDE, SODIUM LACTATE, POTASSIUM CHLORIDE, AND CALCIUM CHLORIDE: 600; 310; 30; 20 INJECTION, SOLUTION INTRAVENOUS at 07:15

## 2023-12-08 RX ADMIN — ATROPINE SULFATE 0.2 MCG: 0.4 INJECTION, SOLUTION INTRAMUSCULAR; INTRAVENOUS; SUBCUTANEOUS at 09:21

## 2023-12-08 RX ADMIN — KETOROLAC TROMETHAMINE 30 MG: 30 INJECTION, SOLUTION INTRAMUSCULAR; INTRAVENOUS at 07:06

## 2023-12-08 RX ADMIN — LIDOCAINE HYDROCHLORIDE 100 MG: 20 INJECTION, SOLUTION EPIDURAL; INFILTRATION; INTRACAUDAL; PERINEURAL at 08:55

## 2023-12-08 RX ADMIN — CARVEDILOL 12.5 MG: 6.25 TABLET, FILM COATED ORAL at 07:43

## 2023-12-08 RX ADMIN — MIDAZOLAM 2 MG: 1 INJECTION INTRAMUSCULAR; INTRAVENOUS at 08:42

## 2023-12-08 RX ADMIN — Medication 10 MG: at 09:19

## 2023-12-08 RX ADMIN — GLYCOPYRROLATE 0.2 MG: 0.2 INJECTION INTRAMUSCULAR; INTRAVENOUS at 09:19

## 2023-12-08 RX ADMIN — SODIUM CHLORIDE, SODIUM LACTATE, POTASSIUM CHLORIDE, AND CALCIUM CHLORIDE: 600; 310; 30; 20 INJECTION, SOLUTION INTRAVENOUS at 09:29

## 2023-12-08 RX ADMIN — Medication 15 MG: at 09:21

## 2023-12-08 ASSESSMENT — PAIN - FUNCTIONAL ASSESSMENT: PAIN_FUNCTIONAL_ASSESSMENT: 0-10

## 2023-12-08 NOTE — OP NOTE
Operative Note      Patient: Anna Tavera  YOB: 1955  MRN: [id number]    Date of Procedure: 12/8/2023    Pre-Op Diagnosis Codes: * Thickened endometrium [R93.89]    Post-Op Diagnosis: Post-Op Diagnosis Codes: * Thickened endometrium [R93.89]  Endometrial polyp and multiloulated cyst in the upper cavity       Procedure(s): HYSTEROSCOPY DILATATION CURETTAGE WITH MYOSURE    Surgeon(s):  Nathaniel Crane MD    Assistant:   * No surgical staff found *    Anesthesia: General with LMA    Estimated Blood Loss (mL): Minimal    Complications: None    Specimens:   ID Type Source Tests Collected by Time Destination   A : endocervical curettings Tissue Cervix SURGICAL PATHOLOGY Nathaniel Crane MD 12/8/2023 0909    B : endometrial polyp and possible cyst Tissue Uterus SURGICAL PATHOLOGY Nathaniel Crane MD 12/8/2023 0919        Implants:  * No implants in log *      Drains: * No LDAs found *    Findings:   EUA revealed:   nl external genitalia and vagina, no abnormalities seen  Cervix:  normal appearing, no lesions/abnormalities seen  Uterus  small, approximately  7  wk size, no adnexal masses. Adnexa:  no palpable masses  Rectal , nl sphincter tone, no nodules/masses  Cavity sounded to 6 cm. Noted:  large polyp prolapsed through the internal os into the endocervical canal, filling the endometrial cavity  Multilobulated cystic mass noted in the upper/fundal  endometrial cavity  Cavity was otherwise smooth. Right ostia nl appearing, could not visualize the left ostia, upper cavity appeared scarred. MYOSURE:    Total fluid deficit 445 ml  Total fluid Volume collected:  1797      cutting time 2 minute 43 seconds   Final pressure 80  mm Hg    Procedure:  After obtaining informed consent pt was taken to the OR where anesthesia was obtained via general w/ LMA . Pt prepped & draped in the usual sterile fashion. Weighted speculum placed in the vagina.   Single tooth tenaculum grasped the anterior grasped the anterior lip of the cervix. Endocervical canal sharply curetted and endocervical curettings sent to pathology  Uterus sounded to 6 cm. The cervix did not require dilation, it was already sufficiently dilated to advance the MyoSure scope. Myosure hysteroscope was advanced under direct visualization. Weighted speculum removed. Cavity surveyed in a panoramic fashion with above findings noted. The \"myosure Reach\"  device was advanced into the endometrial cavity under direct visualization. A second single tooth tenaculum was placed to prevent significant vaginal loss of fluid infused to distend the endometrial cavity and maintain adequate expansion of the endometrial cavity. The diffuse fluffy tissue was removed in the usual fashion and the cavity walls were all  sampled  to ensure a representative sample of tissue was obtained from the entire cavity. The remainder of the cavity was sequentially curetted w/ the [facility] Reach device under direct visualization. After the entire cavity was adequately sampled. The myosure Reach device was removed. The endometrial cavity and endocervical canal were closely inspected and there was no perforation noted. The hysteroscope was removed. Good hemostasis was noted at the tenaculum sites      All instruments were removed from the vagina. Patient was returned to the dorsal supine position, awakened and transferred to the recovery room in stable condition. I spoke with her sister in law, Jason Evans,   postoperatively. Events of surgery were reviewed. She will follow up with me in ~2 weeks, sooner should she have any complications thought to be related to her procedure. I reviewed the importance of:  pelvic rest, nothing in the vagina, no:  sex, tampons douches, tub baths or swimming until her follow up appointment. Also reviewed:  infection, driving and physical activity precautions.         Pt was given prescriptions for post op pain

## 2023-12-08 NOTE — ANESTHESIA PRE PROCEDURE
Department of Anesthesiology  Preprocedure Note       Name:  Adele Handy   Age:  79 y.o.  :  1955                                          MRN:  [id number]         Date:  2023      Surgeon: Gato Boswell):  Rodriguez Rogers MD    Procedure: Procedure(s): HYSTEROSCOPY DILATATION CURETTAGE WITH MYOSURE    Medications prior to admission:   Prior to Admission medications    Medication Sig Start Date End Date Taking? Authorizing Provider   metroNIDAZOLE (FLAGYL) 500 MG tablet Take 1 tablet by mouth 2 times daily  Patient not taking: Reported on 2023   Rodriguez Rogers MD   miSOPROStol (CYTOTEC) 200 MCG tablet Take 200 mcg p.o. at bedtime starting 3 nights before your gynecologic surgery. Take Dec 5-7, 2023 at bedtime  Patient not taking: Reported on 2023   Rodriguez Rogers MD   oxyCODONE (ROXICODONE) 5 MG immediate release tablet Take 1 tablet by mouth every 6 hours as needed for Pain for up to 3 days. Ok to take 2.5 mg- 5 mg po q 6 hours prn. Intended supply: 3 days. Take lowest dose possible to manage pain Max Daily Amount: 20 mg  Patient not taking: Reported on 2023  Rodriguez Rogers MD   FLUoxetine (PROZAC) 40 MG capsule Take 1 capsule by mouth every evening 23   ProviderYenny MD   fluticasone-salmeterol (ADVAIR DISKUS) 250-50 MCG/ACT AEPB diskus inhaler Inhale 1 puff twice a day by inhalation route for 30 days.   Patient not taking: Reported on 2023   ProviderYenny MD   atorvastatin (LIPITOR) 40 MG tablet Take 1 tablet by mouth every evening 23   Yenny Melendez MD   fexofenadine (ALLEGRA) 180 MG tablet Take 1 tablet by mouth daily 23   Sheryl Mcdaniel DO   albuterol sulfate HFA (PROVENTIL;VENTOLIN;PROAIR) 108 (90 Base) MCG/ACT inhaler INHALE 2 PUFFS EVERY 4 TO 6 HOURS AS NEEDED 23   Yenny Melendez MD   carvedilol (COREG) 12.5 MG tablet TAKE 1 TABLET BY MOUTH TWICE A DAY 23   Provider

## 2023-12-08 NOTE — PERIOP NOTE
MD Shrestha at bedside, per Henrietta, patient to wait to take naprosen until this evening along with BP medication once BP has been taken.

## 2023-12-08 NOTE — DISCHARGE SUMMARY
Discharge Summary     Name: Duayne Nageotte MRN: [id number]  SSN: [id number]    YOB: 1955  Age: 79 y.o. Sex: female      Allergies: Adhesive tape, Citalopram, Contrast [iodides], Iodinated contrast media, and Ioversol    Admit Date: 12/8/2023    Discharge Date: 12/8/2023      Admitting Physician: Shelli Franco MD     * Admission Diagnoses: Postmenopausal thickened endometrium per US    * Discharge Diagnoses:  Same    * Procedures: Operative Hysteroscopy    * Discharge Condition: Good    [facility] Course: Normal hospital course for this procedure. Significant Diagnostic Studies:   Recent Results (from the past 24 hour(s))   TYPE AND SCREEN    Collection Time: 12/08/23  7:16 AM   Result Value Ref Range    Crossmatch expiration date 12/11/2023,2359     ABO/Rh O POSITIVE     Antibody Screen NEG        * Disposition: Home    Discharge Medications:      Medication List        ASK your doctor about these medications      Advair Diskus 250-50 MCG/ACT Aepb diskus inhaler  Generic drug: fluticasone-salmeterol     albuterol sulfate  (90 Base) MCG/ACT inhaler  Commonly known as: PROVENTIL;VENTOLIN;PROAIR     atorvastatin 40 MG tablet  Commonly known as: LIPITOR     carvedilol 12.5 MG tablet  Commonly known as: COREG     fexofenadine 180 MG tablet  Commonly known as: ALLEGRA  Take 1 tablet by mouth daily     FLUoxetine 40 MG capsule  Commonly known as: PROzac     lisinopril 40 MG tablet  Commonly known as: PRINIVIL;ZESTRIL     metroNIDAZOLE 500 MG tablet  Commonly known as: Flagyl  Take 1 tablet by mouth 2 times daily     miSOPROStol 200 MCG tablet  Commonly known as: Cytotec  Take 200 mcg p.o. at bedtime starting 3 nights before your gynecologic surgery. Take Dec 5-7, 2023 at bedtime     oxyCODONE 5 MG immediate release tablet  Commonly known as: Roxicodone  Take 1 tablet by mouth every 6 hours as needed for Pain for up to 3 days. Ok to take 2.5 mg- 5 mg po q 6 hours prn. Intended supply: 3 days. supply: 3 days. Take lowest dose possible to manage pain Max Daily Amount: 20 mg     zolpidem 10 MG tablet  Commonly known as: AMBIEN               Follow-up Care/Patient Instructions:    Pelvic rest x 2 wk/until follow up ov (no sex, swimming, tub bathes tampons or douching)  Pt may drive after 2 days as long as she is NOT taking narcotics & can quickly maneuver her foot from the gas to the brake. No driving while taking pain medication. Diet: Resume pre-hospital diet  For the next several days:  eat, shower and advance activity as tolerated. Notify [facility] for temp > 100.5, vaginal discharge with odor, heavy VB, worsening pelvic/abdominal pain and/or other concerns. Wound Care: as directed        Follow up in 2 wk for post op ov.

## 2023-12-08 NOTE — INTERVAL H&P NOTE
November 28, 2022     Danette Patel MD  [address] [facility] [id number] [employer]    Patient: Callie Marie   YOB: 1953   Date of Visit: 11/28/2022       Dear Dr Jen Nascimento: Thank you for referring Althea Springer to me for evaluation  Below are my notes for this consultation  If you have questions, please do not hesitate to call me  I look forward to following your patient along with you  Sincerely,        Vaughn Helm MD        CC: No Recipients  Vaughn Helm MD  11/28/2022  1:58 PM  Sign when Signing Visit  Assessment/Plan:    Encounter for gynecological examination (general) (routine) without abnormal findings  Here for Medicare biannual check  Questions OAB, constipation with intermittent RUQ pain, difficulty maintaining hygiene with hemorrhoidal tags, and memory concerns  Normal breast and pelvic exams  Last pap 9/2019 negative/HPV negative; no further indicated  Mammo order given, last 9/14/22 reviewed on [employer] [facility]  Colonoscopy 2020,due 2025  Dexa 7/3/2018 WNL with femoral neck T-1 1    Encouraged fiber daily for constipation and stool issues  Discussed incontinence  Recommended bladder training with timed voids, avoidance of irritants specifically tea/coffee/soda products and Kegel exercises  Due to history of glaucoma would avoid meds until cleared by opthalmology  Encouraged f/u with PCP for memory concerns  Diagnoses and all orders for this visit:    Encounter for gynecological examination (general) (routine) without abnormal findings    Encounter for screening mammogram for malignant neoplasm of breast  -     Mammo screening bilateral w 3d & cad; Future    OAB (overactive bladder)    Other orders  -     Liquid-based pap, screening  -     levothyroxine (Synthroid) 137 mcg tablet  -     atorvastatin (LIPITOR) 20 mg tablet  -     Multiple Vitamins-Minerals (MULTIVITAMIN ADULTS 50+ PO)         Subjective:     Patient ID: Callie Marie is a 81 y o  female      HPI  Here Update History & Physical    The patient's History and Physical of Dec 8, 2023 was reviewed with the patient and I examined the patient. There was no change. The surgical site was confirmed by the patient and me. Plan: The risks, benefits, expected outcome, and alternative to the recommended procedure have been discussed with the patient. Patient understands and wants to proceed with the procedure.      Electronically signed by Kris Cantu MD on 12/8/2023 at 7:32 AM for Medicare biannual breast and pelvic exams  The following portions of the patient's history were reviewed and updated as appropriate:   She  has a past medical history of Glaucoma, Hyperlipidemia, Hypothyroidism due to Hashimoto's thyroiditis, Osteoarthritis, and Tinnitus  She   Patient Active Problem List    Diagnosis Date Noted   • Encounter for gynecological examination (general) (routine) without abnormal findings 11/28/2022   • OAB (overactive bladder) 11/28/2022     She  has a past surgical history that includes Colonoscopy (2015); DXA procedure(historical) (2018); Thyroid surgery (1991); Knee surgery (1991); Shoulder surgery (Right, 2014); and Replacement total knee (Left, 08/2020)  Her family history includes Colon polyps in her brother; Prostate cancer in her brother; Prostate cancer (age of onset: 61) in her father  She  reports that she has never smoked  She has never used smokeless tobacco  She reports current alcohol use  She reports that she does not use drugs  Current Outpatient Medications   Medication Sig Dispense Refill   • atorvastatin (LIPITOR) 20 mg tablet      • levothyroxine (Synthroid) 137 mcg tablet      • Multiple Vitamins-Minerals (MULTIVITAMIN ADULTS 50+ PO)        No current facility-administered medications for this visit  She has No Known Allergies       Review of Systems No PMB, breast, bladder, bowel changes   No new persistent pain, bloating, early satiety or pelvic pressure      Objective:      /68 (BP Location: Left arm, Patient Position: Sitting, Cuff Size: Standard)   Ht 5' (1 524 m)   Wt 68 9 kg (151 lb 12 8 oz)   Breastfeeding No   BMI 29 65 kg/m²         Physical Exam   General appearance: no distress, pleasant  Neck: thyroid without nodules or thyromegaly, no palpable adenopathy  Lymph nodes: no palpable adenopathy  Breasts: no masses, nodes or skin changes  Abdomen: soft, non tender, no palpable masses  Pelvic exam: normal atrophic external genitalia, urethral meatus normal, vagina atrophic without lesions, cervix atrophic without lesions, uterus small, non tender, no adnexal masses, non tender  Rectal exam: normal sphincter tone, no masses, RV confirms above

## 2023-12-08 NOTE — ANESTHESIA POSTPROCEDURE EVALUATION
Department of Anesthesiology  Postprocedure Note    Patient: Johanne Quinn  MRN: [id number]  YOB: 1955  Date of evaluation: 12/8/2023      Procedure Summary       Date: 12/08/23 Room / Location: [facility] MAIN OR 02 / [facility] MAIN OR    Anesthesia Start: 0950 Anesthesia Stop: 6395    Procedure: HYSTEROSCOPY DILATATION CURETTAGE WITH MYOSURE (Vagina ) Diagnosis: Thickened endometrium      (Thickened endometrium [R93.89])    Surgeons: Vnicent Guan MD Responsible Provider: Alex Castle MD    Anesthesia Type: general ASA Status: 3            Anesthesia Type: No value filed.     Jonas Phase I: Jonas Score: 8    Jonas Phase II:        Anesthesia Post Evaluation    Patient location during evaluation: PACU  Patient participation: complete - patient participated  Level of consciousness: awake  Pain score: 0  Airway patency: patent  Nausea & Vomiting: no nausea and no vomiting  Complications: no  Cardiovascular status: blood pressure returned to baseline and hemodynamically stable  Respiratory status: acceptable, spontaneous ventilation and nonlabored ventilation  Hydration status: euvolemic  Multimodal analgesia pain management approach  Pain management: adequate

## 2023-12-08 NOTE — PERIOP NOTE
MD Melendez notified of patient BP of 179/83. Patient states she has not had her BP medicationtjhis morning.  Per MD Melendez patient ok to be discharged and to take BP medication when she gets home

## 2023-12-14 PROBLEM — Z78.0 POSTMENOPAUSAL: Status: ACTIVE | Noted: 2023-12-14

## 2024-01-15 NOTE — TELEPHONE ENCOUNTER
Behavioral Health Follow-Up   [facility]    Time Start: 1:50 p.m. Time End:  2:50  p.m.  Date of Service:  1/15/2024  Session #: 2    Subjective:  Applying for jobs, has an interview at [employer]  ([employer]) tomorrow. Working on quitting smoking.  Started new relationship, it is going well.    Symptoms reported: Depressed most days.  Low motivation.  Internalizes anger.  Concentration problems.  Memory issues.  Insomnia, never feels rested with sleep.  Struggles with self esteem.  Unexplained weight loss. High degree of anxiety, difficult controlling her anxiety, and difficulty relaxing. Has nightmares, daily flashbacks, dissociates frequently (\"I am really in my head\"), hypervigilance, difficulty with anyone being physically close to her.       Objective:    Mental Status:    Appearance: Appears stated age and Well-kept   Sensorium: alert   Affect:  restricted   Mood:   Dysphoric and Anxious   Thought Process:  Goal-Directed   Thought Content: no evidence of impairment   Behavior:   Cooperative   Psychomotor: no apparent problem   Speech:   appropriate quality, quantity and organization of sentences   Orientation:  oriented to person, place, time, and general circumstances   Judgment & Insight:  normal insight and judgment   Memory: Intact     Objective Testing:  PTSD checklist = 59/80    Assessment:   Progress/response to treatment:   Since initiation of treatment: Job seeking.  Weight loss has begun to stabilize (only 1 lb recently). Kept her journal ask recommended.   Since last visit: same    Risk: Focused assessment deferred, prior evaluation yielded minimal suicidal/homicidal risk and there are no new circumstances to warrant reassessment.      Protective Factors: denial of impulses, hopeful for improved circumstances, and future goals      ICD-10-CM    1. Major depressive disorder, recurrent episode, severe with anxious distress (HCC)  F33.2       2. PTSD (post-traumatic stress  PT needs prescription for muscle relaxer please call pt back, thank you.

## 2024-01-25 ENCOUNTER — OFFICE VISIT (OUTPATIENT)
Dept: OBGYN CLINIC | Age: 69
End: 2024-01-25
Payer: MEDICARE

## 2024-01-25 VITALS
DIASTOLIC BLOOD PRESSURE: 82 MMHG | HEIGHT: 64 IN | SYSTOLIC BLOOD PRESSURE: 130 MMHG | WEIGHT: 167 LBS | BODY MASS INDEX: 28.51 KG/M2

## 2024-01-25 DIAGNOSIS — Z09 SURGERY FOLLOW-UP: ICD-10-CM

## 2024-01-25 DIAGNOSIS — N85.01 ENDOMETRIAL HYPERPLASIA WITHOUT ATYPIA, SIMPLE: Primary | ICD-10-CM

## 2024-01-25 PROCEDURE — G8484 FLU IMMUNIZE NO ADMIN: HCPCS | Performed by: OBSTETRICS & GYNECOLOGY

## 2024-01-25 PROCEDURE — 1123F ACP DISCUSS/DSCN MKR DOCD: CPT | Performed by: OBSTETRICS & GYNECOLOGY

## 2024-01-25 PROCEDURE — 3079F DIAST BP 80-89 MM HG: CPT | Performed by: OBSTETRICS & GYNECOLOGY

## 2024-01-25 PROCEDURE — 99213 OFFICE O/P EST LOW 20 MIN: CPT | Performed by: OBSTETRICS & GYNECOLOGY

## 2024-01-25 PROCEDURE — G8399 PT W/DXA RESULTS DOCUMENT: HCPCS | Performed by: OBSTETRICS & GYNECOLOGY

## 2024-01-25 PROCEDURE — 3017F COLORECTAL CA SCREEN DOC REV: CPT | Performed by: OBSTETRICS & GYNECOLOGY

## 2024-01-25 PROCEDURE — 4004F PT TOBACCO SCREEN RCVD TLK: CPT | Performed by: OBSTETRICS & GYNECOLOGY

## 2024-01-25 PROCEDURE — 3075F SYST BP GE 130 - 139MM HG: CPT | Performed by: OBSTETRICS & GYNECOLOGY

## 2024-01-25 PROCEDURE — G8427 DOCREV CUR MEDS BY ELIG CLIN: HCPCS | Performed by: OBSTETRICS & GYNECOLOGY

## 2024-01-25 PROCEDURE — G8417 CALC BMI ABV UP PARAM F/U: HCPCS | Performed by: OBSTETRICS & GYNECOLOGY

## 2024-01-25 PROCEDURE — 1090F PRES/ABSN URINE INCON ASSESS: CPT | Performed by: OBSTETRICS & GYNECOLOGY

## 2024-01-25 NOTE — PROGRESS NOTES
Post op visit    Bonny Jensen   68 y.o.  1955  [id number]    Today:  24     Reports:  Is 2 weeks postop, doing well.  Denies fever.  No:  nausea or vomiting.  Is eating, drinking & voiding without difficulty.  Bowel function is normal  Is not taking pain medicine.        Surgery date 23  Pre-Op Diagnosis Codes:  Thickened endometrium [R93.89]     Post-Op Diagnosis:    Thickened endometrium [R93.89]  Postmenopausal  Endometrial polyp and multiloculated cyst in the upper cavity       Procedure(s):  HYSTEROSCOPY DILATATION CURETTAGE WITH MYOSURE      2023   DIAGNOSIS       A:  \" ENDOCERVICAL CURETTINGS\":         RARE BENIGN ENDOCERVICAL CELLS, LIMITED MATERIAL          B:  \" ENDOMETRIAL POLYP AND POSSIBLE CYST\":         FRAGMENTS OF NONSECRETORY ENDOMETRIUM FOCALLY HAVING CHANGES   CONSISTENT WITH ENDOMETRIAL HYPERPLASIA, SIMPLE (WITHOUT ATYPIA). SOME   BIOPSY FRAGMENTS HAVE ASSOCIATED BENIGN SMOOTH MUSCLE SUSPICIOUS FOR   SUBMUCOSAL LEIOMYOMA         OB History          3    Para        Term                AB        Living   3         SAB        IAB        Ectopic        Molar        Multiple        Live Births   3          Obstetric Comments   76-->boy, c-s  1978--> boy, c-s  1986--> boy, c-s--> brain tumor (d)             No LMP recorded (lmp unknown). Patient is postmenopausal.  Past Surgical History:   Procedure Laterality Date    BUNIONECTOMY Right      SECTION      x3    COLONOSCOPY  2016    s/p polypectomy    DILATION AND CURETTAGE OF UTERUS N/A 2023    HYSTEROSCOPY DILATATION CURETTAGE WITH MYOSURE performed by Jacinta Shrestha MD at [facility] MAIN OR    WRIST SURGERY Left      Past Medical History:   Diagnosis Date    Allergic rhinitis     Anxiety and depression     Cervical radiculopathy     COPD (chronic obstructive pulmonary disease) ([facility])     no home O2    High cholesterol     History of bone density study 2023    osteopenia    History of

## 2024-01-26 ENCOUNTER — APPOINTMENT (OUTPATIENT)
Dept: CT IMAGING | Age: 69
End: 2024-01-26
Payer: MEDICARE

## 2024-01-26 ENCOUNTER — HOSPITAL ENCOUNTER (EMERGENCY)
Age: 69
Discharge: HOME | End: 2024-01-26
Attending: EMERGENCY MEDICINE
Payer: MEDICARE

## 2024-01-26 VITALS
BODY MASS INDEX: 28.51 KG/M2 | OXYGEN SATURATION: 99 % | WEIGHT: 167 LBS | DIASTOLIC BLOOD PRESSURE: 76 MMHG | HEART RATE: 61 BPM | TEMPERATURE: 98.7 F | SYSTOLIC BLOOD PRESSURE: 172 MMHG | HEIGHT: 64 IN | RESPIRATION RATE: 16 BRPM

## 2024-01-26 DIAGNOSIS — R10.9 FLANK PAIN: Primary | ICD-10-CM

## 2024-01-26 DIAGNOSIS — N30.00 ACUTE CYSTITIS WITHOUT HEMATURIA: ICD-10-CM

## 2024-01-26 LAB
ALBUMIN SERPL-MCNC: 3.9 G/DL (ref 3.2–4.6)
ALBUMIN/GLOB SERPL: 1.2 (ref 0.4–1.6)
ALP SERPL-CCNC: 111 U/L (ref 50–136)
ALT SERPL-CCNC: 17 U/L (ref 12–65)
ANION GAP SERPL CALC-SCNC: 1 MMOL/L (ref 2–11)
APPEARANCE UR: CLEAR
AST SERPL-CCNC: 14 U/L (ref 15–37)
BACTERIA URNS QL MICRO: ABNORMAL /HPF
BASOPHILS # BLD: 0 K/UL (ref 0–0.2)
BASOPHILS NFR BLD: 0 % (ref 0–2)
BILIRUB SERPL-MCNC: 0.4 MG/DL (ref 0.2–1.1)
BILIRUB UR QL: ABNORMAL
BUN SERPL-MCNC: 11 MG/DL (ref 8–23)
CALCIUM SERPL-MCNC: 9.1 MG/DL (ref 8.3–10.4)
CASTS URNS QL MICRO: ABNORMAL /LPF
CHLORIDE SERPL-SCNC: 109 MMOL/L (ref 103–113)
CO2 SERPL-SCNC: 31 MMOL/L (ref 21–32)
COLOR UR: ABNORMAL
CREAT SERPL-MCNC: 0.9 MG/DL (ref 0.6–1)
DIFFERENTIAL METHOD BLD: NORMAL
EOSINOPHIL # BLD: 0.1 K/UL (ref 0–0.8)
EOSINOPHIL NFR BLD: 1 % (ref 0.5–7.8)
EPI CELLS #/AREA URNS HPF: ABNORMAL /HPF
ERYTHROCYTE [DISTWIDTH] IN BLOOD BY AUTOMATED COUNT: 13.9 % (ref 11.9–14.6)
GLOBULIN SER CALC-MCNC: 3.2 G/DL (ref 2.8–4.5)
GLUCOSE SERPL-MCNC: 175 MG/DL (ref 65–100)
GLUCOSE UR STRIP.AUTO-MCNC: NEGATIVE MG/DL
HCT VFR BLD AUTO: 40.2 % (ref 35.8–46.3)
HGB BLD-MCNC: 13 G/DL (ref 11.7–15.4)
HGB UR QL STRIP: NEGATIVE
IMM GRANULOCYTES # BLD AUTO: 0.1 K/UL (ref 0–0.5)
IMM GRANULOCYTES NFR BLD AUTO: 1 % (ref 0–5)
KETONES UR QL STRIP.AUTO: ABNORMAL MG/DL
LEUKOCYTE ESTERASE UR QL STRIP.AUTO: ABNORMAL
LIPASE SERPL-CCNC: 62 U/L (ref 73–393)
LYMPHOCYTES # BLD: 2.4 K/UL (ref 0.5–4.6)
LYMPHOCYTES NFR BLD: 29 % (ref 13–44)
MCH RBC QN AUTO: 30.4 PG (ref 26.1–32.9)
MCHC RBC AUTO-ENTMCNC: 32.3 G/DL (ref 31.4–35)
MCV RBC AUTO: 93.9 FL (ref 82–102)
MONOCYTES # BLD: 0.5 K/UL (ref 0.1–1.3)
MONOCYTES NFR BLD: 6 % (ref 4–12)
MUCOUS THREADS URNS QL MICRO: 0 /LPF
NEUTS SEG # BLD: 5.2 K/UL (ref 1.7–8.2)
NEUTS SEG NFR BLD: 63 % (ref 43–78)
NITRITE UR QL STRIP.AUTO: NEGATIVE
NRBC # BLD: 0 K/UL (ref 0–0.2)
PH UR STRIP: 5 (ref 5–9)
PLATELET # BLD AUTO: 272 K/UL (ref 150–450)
PMV BLD AUTO: 10 FL (ref 9.4–12.3)
POTASSIUM SERPL-SCNC: 4.2 MMOL/L (ref 3.5–5.1)
PROT SERPL-MCNC: 7.1 G/DL (ref 6.3–8.2)
PROT UR STRIP-MCNC: NEGATIVE MG/DL
RBC # BLD AUTO: 4.28 M/UL (ref 4.05–5.2)
RBC #/AREA URNS HPF: ABNORMAL /HPF
SODIUM SERPL-SCNC: 141 MMOL/L (ref 136–146)
SP GR UR REFRACTOMETRY: 1.02 (ref 1–1.02)
URINE CULTURE IF INDICATED: ABNORMAL
UROBILINOGEN UR QL STRIP.AUTO: 1 EU/DL (ref 0.2–1)
WBC # BLD AUTO: 8.2 K/UL (ref 4.3–11.1)
WBC URNS QL MICRO: ABNORMAL /HPF

## 2024-01-26 PROCEDURE — 85025 COMPLETE CBC W/AUTO DIFF WBC: CPT

## 2024-01-26 PROCEDURE — 83690 ASSAY OF LIPASE: CPT

## 2024-01-26 PROCEDURE — 96375 TX/PRO/DX INJ NEW DRUG ADDON: CPT

## 2024-01-26 PROCEDURE — 6360000002 HC RX W HCPCS: Performed by: EMERGENCY MEDICINE

## 2024-01-26 PROCEDURE — 74176 CT ABD & PELVIS W/O CONTRAST: CPT

## 2024-01-26 PROCEDURE — 96374 THER/PROPH/DIAG INJ IV PUSH: CPT

## 2024-01-26 PROCEDURE — 80053 COMPREHEN METABOLIC PANEL: CPT

## 2024-01-26 PROCEDURE — 99284 EMERGENCY DEPT VISIT MOD MDM: CPT

## 2024-01-26 PROCEDURE — 2580000003 HC RX 258: Performed by: EMERGENCY MEDICINE

## 2024-01-26 PROCEDURE — 81001 URINALYSIS AUTO W/SCOPE: CPT

## 2024-01-26 RX ORDER — 0.9 % SODIUM CHLORIDE 0.9 %
1000 INTRAVENOUS SOLUTION INTRAVENOUS
Status: COMPLETED | OUTPATIENT
Start: 2024-01-26 | End: 2024-01-26

## 2024-01-26 RX ORDER — CEPHALEXIN 500 MG/1
500 CAPSULE ORAL 3 TIMES DAILY
Qty: 30 CAPSULE | Refills: 0 | Status: SHIPPED | OUTPATIENT
Start: 2024-01-26 | End: 2024-02-05

## 2024-01-26 RX ORDER — KETOROLAC TROMETHAMINE 15 MG/ML
15 INJECTION, SOLUTION INTRAMUSCULAR; INTRAVENOUS
Status: COMPLETED | OUTPATIENT
Start: 2024-01-26 | End: 2024-01-26

## 2024-01-26 RX ORDER — ONDANSETRON 2 MG/ML
4 INJECTION INTRAMUSCULAR; INTRAVENOUS
Status: COMPLETED | OUTPATIENT
Start: 2024-01-26 | End: 2024-01-26

## 2024-01-26 RX ADMIN — ONDANSETRON 4 MG: 2 INJECTION INTRAMUSCULAR; INTRAVENOUS at 12:01

## 2024-01-26 RX ADMIN — SODIUM CHLORIDE 1000 ML: 9 INJECTION, SOLUTION INTRAVENOUS at 11:58

## 2024-01-26 RX ADMIN — KETOROLAC TROMETHAMINE 15 MG: 15 INJECTION, SOLUTION INTRAMUSCULAR; INTRAVENOUS at 12:01

## 2024-01-26 ASSESSMENT — PAIN - FUNCTIONAL ASSESSMENT
PAIN_FUNCTIONAL_ASSESSMENT: 0-10

## 2024-01-26 ASSESSMENT — ENCOUNTER SYMPTOMS
VOMITING: 0
BACK PAIN: 0
NAUSEA: 1
COLOR CHANGE: 0
CONSTIPATION: 0
COUGH: 0
ABDOMINAL PAIN: 0
DIARRHEA: 0

## 2024-01-26 ASSESSMENT — PAIN SCALES - GENERAL
PAINLEVEL_OUTOF10: 9
PAINLEVEL_OUTOF10: 9
PAINLEVEL_OUTOF10: 6

## 2024-01-26 ASSESSMENT — PAIN DESCRIPTION - LOCATION
LOCATION: FLANK
LOCATION: FLANK

## 2024-01-26 ASSESSMENT — PAIN DESCRIPTION - DESCRIPTORS
DESCRIPTORS: ACHING
DESCRIPTORS: ACHING

## 2024-01-26 ASSESSMENT — PAIN DESCRIPTION - ORIENTATION
ORIENTATION: LEFT
ORIENTATION: LEFT

## 2024-01-26 NOTE — ED NOTES
I have reviewed discharge instructions with the patient.  The patient verbalized understanding.    Patient left ED via Discharge Method: ambulatory to Home with self.    Opportunity for questions and clarification provided.       Patient given 1 scripts.         To continue your aftercare when you leave the hospital, you may receive an automated call from our care team to check in on how you are doing.  This is a free service and part of our promise to provide the best care and service to meet your aftercare needs.” If you have questions, or wish to unsubscribe from this service please call [phone].  Thank you for Choosing our [facility] Emergency Department.        Rupesh Mcdonald RN  01/26/24 0523

## 2024-01-26 NOTE — ED PROVIDER NOTES
Emergency Department Provider Note       PCP: Rhianna Anne MD   Age: 68 y.o.   Sex: female     DISPOSITION Decision To Discharge 01/26/2024 12:46:42 PM       ICD-10-CM    1. Flank pain  R10.9       2. Acute cystitis without hematuria  N30.00           Medical Decision Making     Complexity of Problems Addressed:  1 or more acute illnesses that pose a threat to life or bodily function.     Data Reviewed and Analyzed:   I independently ordered and reviewed each unique test.         I independently ordered and interpreted the ED       I interpreted the CT Scan no free air.    Discussion of management or test interpretation.  Patient left flank pain and UTI.  Will treat with antibiotics.  Feeling better here.  Will discharge.           Risk of Complications and/or Morbidity of Patient Management:  Prescription drug management performed.  Patient was discharged risks and benefits of hospitalization were considered.  Shared medical decision making was utilized in creating the patients health plan today.    Is this patient to be included in the SEP-1 core measure due to severe sepsis or septic shock? No Exclusion criteria - the patient is NOT to be included for SEP-1 Core Measure due to: Infection is not suspected      History      Patient having some left flank pain for the past day or so.  Increased urination.  No dysuria.  Some nausea but no vomiting or diarrhea.  Also constipated for the past 4 days.  Here for evaluation.    The history is provided by the patient. No  was used.   Flank Pain  This is a new problem. The current episode started 2 days ago. The problem occurs constantly. The problem has been gradually worsening. Pertinent negatives include no chest pain, no abdominal pain, no headaches and no shortness of breath. Nothing aggravates the symptoms. Nothing relieves the symptoms. She has tried nothing for the symptoms.        Review of Systems   Constitutional:  Negative for chills

## 2024-01-26 NOTE — ED TRIAGE NOTES
Per patient left sided flank pain that's progressively becoming worse x1 days. States increased urination. States of nausea denies v/d. Denies fever/chills. States constipation x4 days.

## 2024-01-29 ENCOUNTER — CARE COORDINATION (OUTPATIENT)
Dept: CARE COORDINATION | Facility: CLINIC | Age: 69
End: 2024-01-29

## 2024-01-29 NOTE — CARE COORDINATION
Ambulatory Care Management Outreach Attempt    This patient was received as a referral from  Daily assignment for case management of ed utilizer.    Attempted to reach patient for ED follow up.Pt has respectfully declined services at this time, my contact information has been shared with pt in the event there circumstances change. They are free to reach out at any time. ACM signing off.        Patient: oBnny Jensen Patient : 1955 MRN: [id number]    Last Discharge Facility       Date Complaint Diagnosis Description Type Department Provider    24 Flank Pain Flank pain ... ED (DISCHARGE) Sumeet Funk III, MD                Noted following upcoming appointments from discharge chart review:   BS follow up appointment(s):   Future Appointments   Date Time Provider Department Center   2024 11:40 AM Jacinta Shrestha MD [facility]     Non-BS follow up appointment(s):

## 2024-04-25 ENCOUNTER — TELEMEDICINE (OUTPATIENT)
Dept: INTERNAL MEDICINE CLINIC | Facility: CLINIC | Age: 69
End: 2024-04-25
Payer: MEDICARE

## 2024-04-25 DIAGNOSIS — I10 PRIMARY HYPERTENSION: ICD-10-CM

## 2024-04-25 DIAGNOSIS — R93.89 THICKENED ENDOMETRIUM: ICD-10-CM

## 2024-04-25 DIAGNOSIS — F32.A ANXIETY AND DEPRESSION: ICD-10-CM

## 2024-04-25 DIAGNOSIS — E78.00 HYPERCHOLESTEREMIA: ICD-10-CM

## 2024-04-25 DIAGNOSIS — G95.9 CERVICAL MYELOPATHY WITH CERVICAL RADICULOPATHY (HCC): ICD-10-CM

## 2024-04-25 DIAGNOSIS — J30.89 ENVIRONMENTAL AND SEASONAL ALLERGIES: ICD-10-CM

## 2024-04-25 DIAGNOSIS — M54.12 CERVICAL MYELOPATHY WITH CERVICAL RADICULOPATHY (HCC): ICD-10-CM

## 2024-04-25 DIAGNOSIS — J43.8 OTHER EMPHYSEMA (HCC): Primary | ICD-10-CM

## 2024-04-25 DIAGNOSIS — F41.9 ANXIETY AND DEPRESSION: ICD-10-CM

## 2024-04-25 PROCEDURE — 99214 OFFICE O/P EST MOD 30 MIN: CPT | Performed by: INTERNAL MEDICINE

## 2024-04-25 PROCEDURE — 3017F COLORECTAL CA SCREEN DOC REV: CPT | Performed by: INTERNAL MEDICINE

## 2024-04-25 PROCEDURE — 1090F PRES/ABSN URINE INCON ASSESS: CPT | Performed by: INTERNAL MEDICINE

## 2024-04-25 PROCEDURE — G8417 CALC BMI ABV UP PARAM F/U: HCPCS | Performed by: INTERNAL MEDICINE

## 2024-04-25 PROCEDURE — 4004F PT TOBACCO SCREEN RCVD TLK: CPT | Performed by: INTERNAL MEDICINE

## 2024-04-25 PROCEDURE — 3023F SPIROM DOC REV: CPT | Performed by: INTERNAL MEDICINE

## 2024-04-25 PROCEDURE — G8427 DOCREV CUR MEDS BY ELIG CLIN: HCPCS | Performed by: INTERNAL MEDICINE

## 2024-04-25 PROCEDURE — G8399 PT W/DXA RESULTS DOCUMENT: HCPCS | Performed by: INTERNAL MEDICINE

## 2024-04-25 PROCEDURE — 1123F ACP DISCUSS/DSCN MKR DOCD: CPT | Performed by: INTERNAL MEDICINE

## 2024-04-25 RX ORDER — FLUOXETINE HYDROCHLORIDE 40 MG/1
40 CAPSULE ORAL EVERY EVENING
Qty: 90 CAPSULE | Refills: 1 | Status: SHIPPED | OUTPATIENT
Start: 2024-04-25

## 2024-04-25 RX ORDER — METHOCARBAMOL 750 MG/1
750 TABLET, FILM COATED ORAL 4 TIMES DAILY
Qty: 40 TABLET | Refills: 2 | Status: SHIPPED | OUTPATIENT
Start: 2024-04-25 | End: 2024-05-05

## 2024-04-25 RX ORDER — ATORVASTATIN CALCIUM 40 MG/1
40 TABLET, FILM COATED ORAL EVERY EVENING
Qty: 90 TABLET | Refills: 1 | Status: SHIPPED | OUTPATIENT
Start: 2024-04-25

## 2024-04-25 RX ORDER — LISINOPRIL 40 MG/1
40 TABLET ORAL DAILY
Qty: 90 TABLET | Refills: 1 | Status: SHIPPED | OUTPATIENT
Start: 2024-04-25

## 2024-04-25 RX ORDER — FLUTICASONE PROPIONATE AND SALMETEROL 250; 50 UG/1; UG/1
POWDER RESPIRATORY (INHALATION)
Qty: 1 EACH | Refills: 5 | Status: SHIPPED | OUTPATIENT
Start: 2024-04-25

## 2024-04-25 RX ORDER — FEXOFENADINE HCL 180 MG/1
180 TABLET ORAL DAILY
Qty: 90 TABLET | Refills: 1 | Status: SHIPPED | OUTPATIENT
Start: 2024-04-25

## 2024-04-25 ASSESSMENT — ENCOUNTER SYMPTOMS: RESPIRATORY NEGATIVE: 1

## 2024-04-25 NOTE — PROGRESS NOTES
[facility]      2024    Patient Name: Bonny eJnsen  :  1955    Subjective:    Chief Complaint:  Chief Complaint   Patient presents with    Follow-up         HPI I was in the office while conducting this encounter.    Consent:  She and/or her healthcare decision maker is aware that this patient-initiated Telehealth encounter is a billable service, with coverage as determined by her insurance carrier. She is aware that she may receive a bill and has provided verbal consent to proceed: Yes    This virtual visit was conducted via [employer]. Pursuant to the emergency declaration under the [place] Act and the National Emergencies Act, 1135 waiver authority and the Coronavirus Preparedness and Response Supplemental Appropriations Act, this Virtual  Visit was conducted to reduce the patient's risk of exposure to COVID-19 and provide continuity of care for an established patient. Services were provided through a video synchronous discussion virtually to substitute for in-person clinic visit.  Due to this being a TeleHealth evaluation, many elements of the physical examination are unable to be assessed.     Total Time: minutes: 11-20 minutes.       She has some questions regarding possible hysterectomy; she saw Dr. Shrestha, Gyn, in January, has hx of endometrial hyperplasia wo atypia; she had hysteroscopy D&C, noted to have endometrial hyperplasia, simple wo atypia; she is to f/u in 2-4 weeks; patient states hysterectomy was recommended, but she is not sure if she should have this; reviewed pathology report with patient in detail, and after further discussion, plans to contact Dr. Shrestha's office regarding appointment;   She needs refills on some of her medications today;   HTN:  Patient compliant with taking blood pressure medications: Yes  Discussed importance of following low sodium DASH diet, increasing physical activity, taking medications as ordered, decreasing alcohol intake, keeping f/u visits to

## 2024-05-23 ENCOUNTER — OFFICE VISIT (OUTPATIENT)
Dept: OBGYN CLINIC | Age: 69
End: 2024-05-23
Payer: MEDICARE

## 2024-05-23 VITALS
BODY MASS INDEX: 28.34 KG/M2 | WEIGHT: 166 LBS | DIASTOLIC BLOOD PRESSURE: 68 MMHG | SYSTOLIC BLOOD PRESSURE: 122 MMHG | HEIGHT: 64 IN

## 2024-05-23 DIAGNOSIS — N85.01 ENDOMETRIAL HYPERPLASIA WITHOUT ATYPIA, SIMPLE: Primary | ICD-10-CM

## 2024-05-23 DIAGNOSIS — Z11.3 SCREEN FOR STD (SEXUALLY TRANSMITTED DISEASE): ICD-10-CM

## 2024-05-23 DIAGNOSIS — Z11.8 SCREENING EXAMINATION FOR PARASITIC INFECTION: ICD-10-CM

## 2024-05-23 DIAGNOSIS — N89.8 VAGINAL DISCHARGE: ICD-10-CM

## 2024-05-23 DIAGNOSIS — K59.00 CONSTIPATION, UNSPECIFIED CONSTIPATION TYPE: ICD-10-CM

## 2024-05-23 PROCEDURE — G8399 PT W/DXA RESULTS DOCUMENT: HCPCS | Performed by: OBSTETRICS & GYNECOLOGY

## 2024-05-23 PROCEDURE — 3078F DIAST BP <80 MM HG: CPT | Performed by: OBSTETRICS & GYNECOLOGY

## 2024-05-23 PROCEDURE — 3017F COLORECTAL CA SCREEN DOC REV: CPT | Performed by: OBSTETRICS & GYNECOLOGY

## 2024-05-23 PROCEDURE — 99214 OFFICE O/P EST MOD 30 MIN: CPT | Performed by: OBSTETRICS & GYNECOLOGY

## 2024-05-23 PROCEDURE — G8427 DOCREV CUR MEDS BY ELIG CLIN: HCPCS | Performed by: OBSTETRICS & GYNECOLOGY

## 2024-05-23 PROCEDURE — 1090F PRES/ABSN URINE INCON ASSESS: CPT | Performed by: OBSTETRICS & GYNECOLOGY

## 2024-05-23 PROCEDURE — 3074F SYST BP LT 130 MM HG: CPT | Performed by: OBSTETRICS & GYNECOLOGY

## 2024-05-23 PROCEDURE — 4004F PT TOBACCO SCREEN RCVD TLK: CPT | Performed by: OBSTETRICS & GYNECOLOGY

## 2024-05-23 PROCEDURE — G8417 CALC BMI ABV UP PARAM F/U: HCPCS | Performed by: OBSTETRICS & GYNECOLOGY

## 2024-05-23 PROCEDURE — 1123F ACP DISCUSS/DSCN MKR DOCD: CPT | Performed by: OBSTETRICS & GYNECOLOGY

## 2024-05-23 PROCEDURE — 99459 PELVIC EXAMINATION: CPT | Performed by: OBSTETRICS & GYNECOLOGY

## 2024-05-23 ASSESSMENT — PATIENT HEALTH QUESTIONNAIRE - PHQ9
8. MOVING OR SPEAKING SO SLOWLY THAT OTHER PEOPLE COULD HAVE NOTICED. OR THE OPPOSITE, BEING SO FIGETY OR RESTLESS THAT YOU HAVE BEEN MOVING AROUND A LOT MORE THAN USUAL: NOT AT ALL
7. TROUBLE CONCENTRATING ON THINGS, SUCH AS READING THE NEWSPAPER OR WATCHING TELEVISION: NOT AT ALL
SUM OF ALL RESPONSES TO PHQ9 QUESTIONS 1 & 2: 0
3. TROUBLE FALLING OR STAYING ASLEEP: NOT AT ALL
5. POOR APPETITE OR OVEREATING: NOT AT ALL
2. FEELING DOWN, DEPRESSED OR HOPELESS: NOT AT ALL
SUM OF ALL RESPONSES TO PHQ QUESTIONS 1-9: 0
6. FEELING BAD ABOUT YOURSELF - OR THAT YOU ARE A FAILURE OR HAVE LET YOURSELF OR YOUR FAMILY DOWN: NOT AT ALL
SUM OF ALL RESPONSES TO PHQ QUESTIONS 1-9: 0
9. THOUGHTS THAT YOU WOULD BE BETTER OFF DEAD, OR OF HURTING YOURSELF: NOT AT ALL
1. LITTLE INTEREST OR PLEASURE IN DOING THINGS: NOT AT ALL
SUM OF ALL RESPONSES TO PHQ QUESTIONS 1-9: 0
SUM OF ALL RESPONSES TO PHQ QUESTIONS 1-9: 0

## 2024-05-23 NOTE — PROGRESS NOTES
Bonny Jensen  68 y.o.  1955  [id number]    Today:  24        Chief Complaint   Patient presents with    Follow-up       Subjective:  Bonny Jensen is a 68 y.o. , presents today with concerns regarding:     No LMP recorded (lmp unknown). Patient is postmenopausal.    OB History    Para Term  AB Living   3 0 0 0 0 3   SAB IAB Ectopic Molar Multiple Live Births   0 0 0 0 0 3   Obstetric Comments   78-->boy, c-s   1978--> boy, c-s   1986--> boy, c-s--> brain tumor (d)       Allergies   Allergen Reactions    Adhesive Tape Rash    Citalopram Other (See Comments)    Contrast [Iodides] Hives    Iodinated Contrast Media Hives    Ioversol Other (See Comments)       Current Outpatient Medications   Medication Sig    fexofenadine (ALLEGRA) 180 MG tablet Take 1 tablet by mouth daily    fluticasone-salmeterol (ADVAIR DISKUS) 250-50 MCG/ACT AEPB diskus inhaler Inhale 1 puff twice a day by inhalation route for 30 days.    FLUoxetine (PROZAC) 40 MG capsule Take 1 capsule by mouth every evening    lisinopril (PRINIVIL;ZESTRIL) 40 MG tablet Take 1 tablet by mouth daily    atorvastatin (LIPITOR) 40 MG tablet Take 1 tablet by mouth every evening    albuterol sulfate HFA (PROVENTIL;VENTOLIN;PROAIR) 108 (90 Base) MCG/ACT inhaler INHALE 2 PUFFS EVERY 4 TO 6 HOURS AS NEEDED    carvedilol (COREG) 12.5 MG tablet TAKE 1 TABLET BY MOUTH TWICE A DAY    zolpidem (AMBIEN) 10 MG tablet Take 1 tablet by mouth nightly as needed.    naproxen (EC-NAPROSYN) 500 MG EC tablet Take 1 tablet by mouth 2 times daily as needed for Pain Take w/ food. (Patient not taking: Reported on 2024)    metroNIDAZOLE (FLAGYL) 500 MG tablet Take 1 tablet by mouth 2 times daily (Patient not taking: Reported on 2024)     No current facility-administered medications for this visit.       Past Medical History:   Diagnosis Date    Allergic rhinitis     Anxiety and depression     Cervical radiculopathy     COPD (chronic

## 2024-05-28 LAB
A VAGINAE DNA VAG QL NAA+PROBE: NORMAL SCORE
BVAB2 DNA VAG QL NAA+PROBE: NORMAL SCORE
C ALBICANS DNA VAG QL NAA+PROBE: NEGATIVE
C GLABRATA DNA VAG QL NAA+PROBE: NEGATIVE
C TRACH RRNA SPEC QL NAA+PROBE: NEGATIVE
MEGA1 DNA VAG QL NAA+PROBE: NORMAL SCORE
N GONORRHOEA RRNA SPEC QL NAA+PROBE: NEGATIVE
SPECIMEN SOURCE: NORMAL
T VAGINALIS RRNA SPEC QL NAA+PROBE: NEGATIVE

## 2024-07-02 ENCOUNTER — PROCEDURE VISIT (OUTPATIENT)
Dept: OBGYN CLINIC | Age: 69
End: 2024-07-02
Payer: MEDICARE

## 2024-07-02 ENCOUNTER — OFFICE VISIT (OUTPATIENT)
Dept: OBGYN CLINIC | Age: 69
End: 2024-07-02

## 2024-07-02 ENCOUNTER — TELEPHONE (OUTPATIENT)
Dept: OBGYN CLINIC | Age: 69
End: 2024-07-02

## 2024-07-02 VITALS
HEIGHT: 64 IN | BODY MASS INDEX: 28.51 KG/M2 | WEIGHT: 167 LBS | SYSTOLIC BLOOD PRESSURE: 140 MMHG | DIASTOLIC BLOOD PRESSURE: 80 MMHG

## 2024-07-02 DIAGNOSIS — Z11.51 SCREENING FOR HUMAN PAPILLOMAVIRUS (HPV): ICD-10-CM

## 2024-07-02 DIAGNOSIS — Z22.39 CARRIER OF UREAPLASMA UREALYTICUM: ICD-10-CM

## 2024-07-02 DIAGNOSIS — N89.8 VAGINAL DISCHARGE: ICD-10-CM

## 2024-07-02 DIAGNOSIS — N85.01 ENDOMETRIAL HYPERPLASIA WITHOUT ATYPIA, SIMPLE: Primary | ICD-10-CM

## 2024-07-02 DIAGNOSIS — Z12.4 SCREENING FOR CERVICAL CANCER: ICD-10-CM

## 2024-07-02 PROCEDURE — 76830 TRANSVAGINAL US NON-OB: CPT | Performed by: OBSTETRICS & GYNECOLOGY

## 2024-07-02 NOTE — TELEPHONE ENCOUNTER
Patient was seen this morning and was under the impression that medication would be sent in for her.  She has been waiting at the pharmacy and nothing has been sent in.    Please advise.

## 2024-07-02 NOTE — PROGRESS NOTES
by mouth nightly as needed.     No current facility-administered medications for this visit.       OB History          6    Para   3    Term                AB        Living   3         SAB        IAB        Ectopic        Molar        Multiple        Live Births   3          Obstetric Comments   78-->boy, c-s  1978--> boy, c-s  1986--> boy, c-s--> brain tumor (d)             No LMP recorded (lmp unknown). Patient is postmenopausal.  Past Surgical History:   Procedure Laterality Date    BUNIONECTOMY Right      SECTION      x3    COLONOSCOPY      s/p polypectomy    DILATION AND CURETTAGE OF UTERUS N/A 2023    HYSTEROSCOPY DILATATION CURETTAGE WITH MYOSURE performed by Jacinta Shrestha MD at [facility] MAIN OR    WRIST SURGERY Left      Past Medical History:   Diagnosis Date    Allergic rhinitis     Anxiety and depression     Cervical radiculopathy     COPD (chronic obstructive pulmonary disease) (HCC)     no home O2    High cholesterol     History of bone density study 2023    osteopenia    History of mammogram 2023    History of Papanicolaou smear of cervix 2023    History of seizures     poss seizure - about     Hypertension     Osteopenia      Family History   Problem Relation Age of Onset    Stroke Mother     Hypertension Mother     No Known Problems Father     Hypertension Son         Both sons    Pseudotumor Cerebri Child     Breast Problems Paternal Cousin         ? Paternal cousins BREAST cancer       Review of Systems    Updated from patient's \"Review of Systems\" form that patient completed.       Physical Exam:   BP (!) 140/80   Ht 1.626 m (5' 4\")   Wt 75.8 kg (167 lb)   LMP  (LMP Unknown)   BMI 28.67 kg/m²     Patient is a 68 y.o. female who appears pleasant, in no apparent distress, her given age, well developed, well nourished and with good attention to hygiene and body habitus.   Oriented to person, place and time.   Mood and affect normal and

## 2024-07-05 LAB
COLLECTION METHOD: NORMAL
CYTOLOGIST CVX/VAG CYTO: NORMAL
CYTOLOGY CVX/VAG DOC THIN PREP: NORMAL
HPV APTIMA: NEGATIVE
HPV GENOTYPE REFLEX: NORMAL
Lab: NORMAL
M GENITALIUM DNA SPEC QL NAA+PROBE: NEGATIVE
M HOMINIS DNA SPEC QL NAA+PROBE: NEGATIVE
OTHER PT INFO: NORMAL
PAP SOURCE: NORMAL
PATH REPORT.FINAL DX SPEC: NORMAL
PREV CYTO INFO: NEGATIVE
PREV TREATMENT RESULTS: NORMAL
PREV TREATMENT: NORMAL
SPECIMEN SOURCE: ABNORMAL
STAT OF ADQ CVX/VAG CYTO-IMP: NORMAL
UREAPLASMA DNA SPEC QL NAA+PROBE: POSITIVE

## 2024-07-07 LAB
A VAGINAE DNA VAG QL NAA+PROBE: NORMAL SCORE
BVAB2 DNA VAG QL NAA+PROBE: NORMAL SCORE
C ALBICANS DNA VAG QL NAA+PROBE: NEGATIVE
C GLABRATA DNA VAG QL NAA+PROBE: NEGATIVE
C TRACH RRNA SPEC QL NAA+PROBE: NEGATIVE
CANDIDA KRUSEI: NEGATIVE
CANDIDA LUSITANIAE, NAA: NEGATIVE
CANDIDA PARAPSILOSIS/TROPICALIS: NEGATIVE
MEGA1 DNA VAG QL NAA+PROBE: NORMAL SCORE
N GONORRHOEA RRNA SPEC QL NAA+PROBE: NEGATIVE
T VAGINALIS RRNA SPEC QL NAA+PROBE: NEGATIVE

## 2024-07-08 RX ORDER — PROGESTERONE 200 MG/1
CAPSULE ORAL
Qty: 90 CAPSULE | Refills: 3 | Status: SHIPPED | OUTPATIENT
Start: 2024-07-08

## 2024-07-08 RX ORDER — DOXYCYCLINE HYCLATE 100 MG
TABLET ORAL
Qty: 14 TABLET | Refills: 0 | Status: SHIPPED | OUTPATIENT
Start: 2024-07-08

## 2024-07-12 ENCOUNTER — TELEMEDICINE (OUTPATIENT)
Dept: INTERNAL MEDICINE CLINIC | Facility: CLINIC | Age: 69
End: 2024-07-12
Payer: MEDICARE

## 2024-07-12 DIAGNOSIS — M54.42 CHRONIC MIDLINE LOW BACK PAIN WITH BILATERAL SCIATICA: Primary | ICD-10-CM

## 2024-07-12 DIAGNOSIS — M25.511 ACUTE PAIN OF RIGHT SHOULDER: ICD-10-CM

## 2024-07-12 DIAGNOSIS — M54.41 CHRONIC MIDLINE LOW BACK PAIN WITH BILATERAL SCIATICA: Primary | ICD-10-CM

## 2024-07-12 DIAGNOSIS — G89.29 CHRONIC MIDLINE LOW BACK PAIN WITH BILATERAL SCIATICA: Primary | ICD-10-CM

## 2024-07-12 PROCEDURE — 99442 PR PHYS/QHP TELEPHONE EVALUATION 11-20 MIN: CPT | Performed by: INTERNAL MEDICINE

## 2024-07-12 RX ORDER — METHOCARBAMOL 750 MG/1
750 TABLET, FILM COATED ORAL 4 TIMES DAILY
COMMUNITY
Start: 2024-07-09

## 2024-07-12 RX ORDER — GABAPENTIN 100 MG/1
100 CAPSULE ORAL NIGHTLY
Qty: 30 CAPSULE | Refills: 5 | Status: SHIPPED | OUTPATIENT
Start: 2024-07-12 | End: 2025-01-08

## 2024-07-12 ASSESSMENT — ENCOUNTER SYMPTOMS
RESPIRATORY NEGATIVE: 1
BACK PAIN: 1

## 2024-07-12 NOTE — PROGRESS NOTES
w/ labs.         Medication Reconciliation:  Current Medications Verified: Current medications/ immunizations were reviewed, including purpose, with patient.  Family History, Social History, Current and Past Medical History was reviewed with patient and updated at today's office visit.  Medication Reconciliation list was given to patient/ family.  Patient was advised to discard old medication lists and provide all providers with current list at each visit and carry list with them in case of emergency.    Completed By:   Rhianna nAne MD    [facility]  [address] [place] [address], [address] [address]  [phone] fax  5:11 PM

## 2024-09-12 ENCOUNTER — TELEMEDICINE (OUTPATIENT)
Dept: INTERNAL MEDICINE CLINIC | Facility: CLINIC | Age: 69
End: 2024-09-12

## 2024-09-12 DIAGNOSIS — M54.6 ACUTE MIDLINE THORACIC BACK PAIN: Primary | ICD-10-CM

## 2024-09-12 ASSESSMENT — ENCOUNTER SYMPTOMS
SHORTNESS OF BREATH: 0
WHEEZING: 0
BACK PAIN: 1
COUGH: 1

## 2024-10-15 ENCOUNTER — APPOINTMENT (OUTPATIENT)
Dept: CT IMAGING | Age: 69
End: 2024-10-15
Payer: MEDICARE

## 2024-10-15 ENCOUNTER — HOSPITAL ENCOUNTER (EMERGENCY)
Age: 69
Discharge: HOME | End: 2024-10-15
Payer: MEDICARE

## 2024-10-15 VITALS
SYSTOLIC BLOOD PRESSURE: 166 MMHG | HEART RATE: 57 BPM | DIASTOLIC BLOOD PRESSURE: 103 MMHG | TEMPERATURE: 98.5 F | RESPIRATION RATE: 18 BRPM | OXYGEN SATURATION: 98 %

## 2024-10-15 DIAGNOSIS — R10.9 LEFT FLANK PAIN: ICD-10-CM

## 2024-10-15 DIAGNOSIS — M54.50 ACUTE EXACERBATION OF CHRONIC LOW BACK PAIN: Primary | ICD-10-CM

## 2024-10-15 DIAGNOSIS — G89.29 ACUTE EXACERBATION OF CHRONIC LOW BACK PAIN: Primary | ICD-10-CM

## 2024-10-15 LAB
ALBUMIN SERPL-MCNC: 3.6 G/DL (ref 3.2–4.6)
ALBUMIN/GLOB SERPL: 1.2 (ref 1–1.9)
ALP SERPL-CCNC: 119 U/L (ref 35–104)
ALT SERPL-CCNC: 19 U/L (ref 8–45)
ANION GAP SERPL CALC-SCNC: 9 MMOL/L (ref 9–18)
APPEARANCE UR: CLEAR
AST SERPL-CCNC: 22 U/L (ref 15–37)
BACTERIA URNS QL MICRO: NEGATIVE /HPF
BASOPHILS # BLD: 0 K/UL (ref 0–0.2)
BASOPHILS NFR BLD: 1 % (ref 0–2)
BILIRUB SERPL-MCNC: 0.2 MG/DL (ref 0–1.2)
BILIRUB UR QL: NEGATIVE
BUN SERPL-MCNC: 11 MG/DL (ref 8–23)
CALCIUM SERPL-MCNC: 8.9 MG/DL (ref 8.8–10.2)
CHLORIDE SERPL-SCNC: 106 MMOL/L (ref 98–107)
CO2 SERPL-SCNC: 26 MMOL/L (ref 20–28)
COLOR UR: ABNORMAL
CREAT SERPL-MCNC: 0.77 MG/DL (ref 0.6–1.1)
DIFFERENTIAL METHOD BLD: NORMAL
EOSINOPHIL # BLD: 0.1 K/UL (ref 0–0.8)
EOSINOPHIL NFR BLD: 1 % (ref 0.5–7.8)
EPI CELLS #/AREA URNS HPF: NORMAL /HPF
ERYTHROCYTE [DISTWIDTH] IN BLOOD BY AUTOMATED COUNT: 14 % (ref 11.9–14.6)
GLOBULIN SER CALC-MCNC: 3.1 G/DL (ref 2.3–3.5)
GLUCOSE SERPL-MCNC: 198 MG/DL (ref 70–99)
GLUCOSE UR STRIP.AUTO-MCNC: 250 MG/DL
HCT VFR BLD AUTO: 39 % (ref 35.8–46.3)
HGB BLD-MCNC: 12.6 G/DL (ref 11.7–15.4)
HGB UR QL STRIP: NEGATIVE
HYALINE CASTS URNS QL MICRO: NORMAL /LPF
IMM GRANULOCYTES # BLD AUTO: 0.1 K/UL (ref 0–0.5)
IMM GRANULOCYTES NFR BLD AUTO: 2 % (ref 0–5)
KETONES UR QL STRIP.AUTO: NEGATIVE MG/DL
LEUKOCYTE ESTERASE UR QL STRIP.AUTO: NEGATIVE
LIPASE SERPL-CCNC: 15 U/L (ref 13–60)
LYMPHOCYTES # BLD: 2.2 K/UL (ref 0.5–4.6)
LYMPHOCYTES NFR BLD: 27 % (ref 13–44)
MCH RBC QN AUTO: 31 PG (ref 26.1–32.9)
MCHC RBC AUTO-ENTMCNC: 32.3 G/DL (ref 31.4–35)
MCV RBC AUTO: 95.8 FL (ref 82–102)
MONOCYTES # BLD: 0.4 K/UL (ref 0.1–1.3)
MONOCYTES NFR BLD: 5 % (ref 4–12)
NEUTS SEG # BLD: 5.3 K/UL (ref 1.7–8.2)
NEUTS SEG NFR BLD: 65 % (ref 43–78)
NITRITE UR QL STRIP.AUTO: NEGATIVE
NRBC # BLD: 0 K/UL (ref 0–0.2)
PH UR STRIP: 8 (ref 5–9)
PLATELET # BLD AUTO: 264 K/UL (ref 150–450)
PMV BLD AUTO: 10.3 FL (ref 9.4–12.3)
POTASSIUM SERPL-SCNC: 4.3 MMOL/L (ref 3.5–5.1)
PROT SERPL-MCNC: 6.7 G/DL (ref 6.3–8.2)
PROT UR STRIP-MCNC: NEGATIVE MG/DL
RBC # BLD AUTO: 4.07 M/UL (ref 4.05–5.2)
RBC #/AREA URNS HPF: NORMAL /HPF
SODIUM SERPL-SCNC: 141 MMOL/L (ref 136–145)
SP GR UR REFRACTOMETRY: 1.01 (ref 1–1.02)
UROBILINOGEN UR QL STRIP.AUTO: 0.2 EU/DL (ref 0.2–1)
WBC # BLD AUTO: 8 K/UL (ref 4.3–11.1)
WBC URNS QL MICRO: NORMAL /HPF

## 2024-10-15 PROCEDURE — 96375 TX/PRO/DX INJ NEW DRUG ADDON: CPT

## 2024-10-15 PROCEDURE — 74176 CT ABD & PELVIS W/O CONTRAST: CPT

## 2024-10-15 PROCEDURE — 6360000002 HC RX W HCPCS: Performed by: PHYSICIAN ASSISTANT

## 2024-10-15 PROCEDURE — 99284 EMERGENCY DEPT VISIT MOD MDM: CPT

## 2024-10-15 PROCEDURE — 81001 URINALYSIS AUTO W/SCOPE: CPT

## 2024-10-15 PROCEDURE — 83690 ASSAY OF LIPASE: CPT

## 2024-10-15 PROCEDURE — 80053 COMPREHEN METABOLIC PANEL: CPT

## 2024-10-15 PROCEDURE — 85025 COMPLETE CBC W/AUTO DIFF WBC: CPT

## 2024-10-15 PROCEDURE — 96374 THER/PROPH/DIAG INJ IV PUSH: CPT

## 2024-10-15 RX ORDER — KETOROLAC TROMETHAMINE 15 MG/ML
15 INJECTION, SOLUTION INTRAMUSCULAR; INTRAVENOUS ONCE
Status: COMPLETED | OUTPATIENT
Start: 2024-10-15 | End: 2024-10-15

## 2024-10-15 RX ORDER — MORPHINE SULFATE 4 MG/ML
4 INJECTION, SOLUTION INTRAMUSCULAR; INTRAVENOUS
Status: COMPLETED | OUTPATIENT
Start: 2024-10-15 | End: 2024-10-15

## 2024-10-15 RX ORDER — MELOXICAM 7.5 MG/1
7.5 TABLET ORAL DAILY
Qty: 10 TABLET | Refills: 0 | Status: SHIPPED | OUTPATIENT
Start: 2024-10-15

## 2024-10-15 RX ORDER — METHOCARBAMOL 750 MG/1
750 TABLET, FILM COATED ORAL 3 TIMES DAILY
Qty: 30 TABLET | Refills: 0 | Status: SHIPPED | OUTPATIENT
Start: 2024-10-15 | End: 2024-10-25

## 2024-10-15 RX ADMIN — KETOROLAC TROMETHAMINE 15 MG: 15 INJECTION, SOLUTION INTRAMUSCULAR; INTRAVENOUS at 12:54

## 2024-10-15 RX ADMIN — MORPHINE SULFATE 4 MG: 4 INJECTION, SOLUTION INTRAMUSCULAR; INTRAVENOUS at 12:54

## 2024-10-15 ASSESSMENT — ENCOUNTER SYMPTOMS
BACK PAIN: 1
RESPIRATORY NEGATIVE: 1

## 2024-10-15 ASSESSMENT — PAIN SCALES - GENERAL
PAINLEVEL_OUTOF10: 0
PAINLEVEL_OUTOF10: 10

## 2024-10-15 NOTE — ED PROVIDER NOTES
Emergency Department Provider Note       PCP: Rhianna Anne MD   Age: 69 y.o.   Sex: female     DISPOSITION Decision To Discharge 10/15/2024 01:44:17 PM  Condition at Disposition: Data Unavailable       ICD-10-CM    1. Acute exacerbation of chronic low back pain  M54.50     G89.29       2. Left flank pain  R10.9           Medical Decision Making     69-year-old female presenting with left-sided low back pain radiating around to her flank.  Has history of chronic low back pain, but does not usually have pain radiating around to the front.  Worse with certain movements.  Workup here is unrevealing including CT scan of the abdomen.  Urine without evidence of infection.  She was given Toradol and morphine with significant improvement of symptoms.  Ambulatory without difficulty.  Will have her follow-up closely with her primary doctor and return if worsening in any way.  Will treat with muscle relaxants and NSAIDs.  ED Course as of 10/15/24 1650   Tue Oct 15, 2024   1322 Feeling better at rest. Some pain left sided low back pain with certain movements [KETAN]      ED Course User Index  [KETAN] Maliha Allred PA     1 or more acute illnesses that pose a threat to life or bodily function.   Prescription drug management performed.  Patient was discharged risks and benefits of hospitalization were considered.  Shared medical decision making was utilized in creating the patients health plan today.  I independently ordered and reviewed each unique test.                         History     69-year-old female with history of hypertension presents with left-sided flank pain that started this morning.  She tells me she has chronic low back pain, but it typically does not radiate.  This morning when she was going to the bathroom she started to have pain in her low back radiating around the left flank.  She reports she has had increased urinary frequency but no dysuria.  No fever.  Denies abdominal pain.  She was constipated so she

## 2024-10-16 ENCOUNTER — CARE COORDINATION (OUTPATIENT)
Dept: CARE COORDINATION | Facility: CLINIC | Age: 69
End: 2024-10-16

## 2024-10-16 ENCOUNTER — TELEPHONE (OUTPATIENT)
Dept: INTERNAL MEDICINE CLINIC | Facility: CLINIC | Age: 69
End: 2024-10-16

## 2024-10-16 NOTE — TELEPHONE ENCOUNTER
----- Message from Dev PRESSLEY sent at 10/16/2024 12:56 PM EDT -----  Regarding: ECC Appointment Request  ECC Appointment Request    Patient needs appointment for ECC Appointment Type: ED Follow-Up.    Patient Requested Dates(s): 10/18/2024  Patient Requested Time: 1:00 PM  Provider Name:Rhianna Anne MD    Reason for Appointment Request: Established Patient - Available appointments did not meet patient need  --------------------------------------------------------------------------------------------------------------------------    Relationship to Patient: Self     Call Back Information: OK to leave message on voicemail  Preferred Call Back Number: Phone ECC Appointment Request

## 2024-10-16 NOTE — CARE COORDINATION
Ambulatory Care Management Outreach Attempt    This patient was received as a referral from  Daily assignment for case management of ed utilizer.    Attempted to reach patient for ED follow up. Pt has respectfully declined services at this time, my contact information has been shared with pt in the event there circumstances change. They are free to reach out at any time. ACM signing off.        Patient: Bonny Jensen Patient : 1955 MRN: [id number]    Last Discharge Facility       Date Complaint Diagnosis Description Type Department Provider    10/15/24 Back Pain Acute exacerbation of chronic low back pain ... ED (DISCHARGE) [facility]                 Noted following upcoming appointments from discharge chart review:   [facility] [place] [facility]):   Future Appointments   Date Time Provider Department Center   2025  9:15 AM [facility] AMB   2025 10:00 AM [facility]     [facility] follow up appointment(s):

## 2024-10-22 ENCOUNTER — OFFICE VISIT (OUTPATIENT)
Dept: INTERNAL MEDICINE CLINIC | Facility: CLINIC | Age: 69
End: 2024-10-22
Payer: MEDICARE

## 2024-10-22 VITALS
TEMPERATURE: 97 F | OXYGEN SATURATION: 100 % | BODY MASS INDEX: 27.39 KG/M2 | SYSTOLIC BLOOD PRESSURE: 188 MMHG | HEIGHT: 64 IN | DIASTOLIC BLOOD PRESSURE: 100 MMHG | HEART RATE: 69 BPM | WEIGHT: 160.4 LBS

## 2024-10-22 DIAGNOSIS — G89.29 CHRONIC LEFT-SIDED LOW BACK PAIN WITH LEFT-SIDED SCIATICA: Primary | ICD-10-CM

## 2024-10-22 DIAGNOSIS — I10 PRIMARY HYPERTENSION: ICD-10-CM

## 2024-10-22 DIAGNOSIS — M54.42 CHRONIC LEFT-SIDED LOW BACK PAIN WITH LEFT-SIDED SCIATICA: Primary | ICD-10-CM

## 2024-10-22 PROCEDURE — 99215 OFFICE O/P EST HI 40 MIN: CPT | Performed by: NURSE PRACTITIONER

## 2024-10-22 PROCEDURE — 4004F PT TOBACCO SCREEN RCVD TLK: CPT | Performed by: NURSE PRACTITIONER

## 2024-10-22 PROCEDURE — 3077F SYST BP >= 140 MM HG: CPT | Performed by: NURSE PRACTITIONER

## 2024-10-22 PROCEDURE — 3017F COLORECTAL CA SCREEN DOC REV: CPT | Performed by: NURSE PRACTITIONER

## 2024-10-22 PROCEDURE — G8417 CALC BMI ABV UP PARAM F/U: HCPCS | Performed by: NURSE PRACTITIONER

## 2024-10-22 PROCEDURE — G8427 DOCREV CUR MEDS BY ELIG CLIN: HCPCS | Performed by: NURSE PRACTITIONER

## 2024-10-22 PROCEDURE — G8399 PT W/DXA RESULTS DOCUMENT: HCPCS | Performed by: NURSE PRACTITIONER

## 2024-10-22 PROCEDURE — 3080F DIAST BP >= 90 MM HG: CPT | Performed by: NURSE PRACTITIONER

## 2024-10-22 PROCEDURE — 1090F PRES/ABSN URINE INCON ASSESS: CPT | Performed by: NURSE PRACTITIONER

## 2024-10-22 PROCEDURE — 1123F ACP DISCUSS/DSCN MKR DOCD: CPT | Performed by: NURSE PRACTITIONER

## 2024-10-22 PROCEDURE — G8484 FLU IMMUNIZE NO ADMIN: HCPCS | Performed by: NURSE PRACTITIONER

## 2024-10-22 RX ORDER — TRAMADOL HYDROCHLORIDE 50 MG/1
50 TABLET ORAL EVERY 8 HOURS PRN
Qty: 15 TABLET | Refills: 0 | Status: SHIPPED | OUTPATIENT
Start: 2024-10-22 | End: 2024-10-27

## 2024-10-22 RX ORDER — AMLODIPINE BESYLATE 5 MG/1
5 TABLET ORAL DAILY
Qty: 90 TABLET | Refills: 0 | Status: SHIPPED | OUTPATIENT
Start: 2024-10-22

## 2024-10-22 RX ORDER — METHYLPREDNISOLONE 4 MG/1
TABLET ORAL
Qty: 21 TABLET | Refills: 0 | Status: SHIPPED | OUTPATIENT
Start: 2024-10-22 | End: 2024-10-28

## 2024-10-22 SDOH — ECONOMIC STABILITY: INCOME INSECURITY: HOW HARD IS IT FOR YOU TO PAY FOR THE VERY BASICS LIKE FOOD, HOUSING, MEDICAL CARE, AND HEATING?: PATIENT DECLINED

## 2024-10-22 SDOH — ECONOMIC STABILITY: FOOD INSECURITY: WITHIN THE PAST 12 MONTHS, THE FOOD YOU BOUGHT JUST DIDN'T LAST AND YOU DIDN'T HAVE MONEY TO GET MORE.: PATIENT DECLINED

## 2024-10-22 SDOH — ECONOMIC STABILITY: FOOD INSECURITY: WITHIN THE PAST 12 MONTHS, YOU WORRIED THAT YOUR FOOD WOULD RUN OUT BEFORE YOU GOT MONEY TO BUY MORE.: PATIENT DECLINED

## 2024-10-22 NOTE — PROGRESS NOTES
[facility]      10/24/2024    Patient Name: Bonny Jensen  :  1955      Chief Complaint:  Chief Complaint   Patient presents with    Other     Pt stats she has been having back pain and leg pain all the way up to the groin.          HPI  Patient presents today for follow-up on recent ER visit. Seen in the ER on 10/15/24 with complaint of left-sided low back pain. Symptoms started about 2 weeks ago. Pain is constant; starts in left low back and radiates around to her left groin. Associated symptoms include lateral left thigh numbness. History of chronic low back pain but does not usually have pain radiating around to the front. Pain is aggravated by walking, standing, sitting for long periods, bending. Lab work in the ER was unrevealing. CT abd/pelvis was negative for any acute abnormality. UA without signs of infection. She was given Toradol and morphine with significant improvement of symptoms. Discharged with RX for methocarbamol and meloxicam with instructed to follow-up with PCP.     She presents today reporting no improvement in her pain. In fact, she feels that the pain is actually worse than when she was in the ER. No saddle anesthesia or loss of bowel/bladder control.     BP elevated in the office today. Was elevated in the ER as well last week. She checks it a couple of times weekly at home and reports readings consistently in the 180s sytolic.         Past Medical History:   Diagnosis Date    Allergic rhinitis     Anxiety and depression     Cervical radiculopathy     COPD (chronic obstructive pulmonary disease) (HCC)     no home O2    High cholesterol     History of bone density study 2023    osteopenia    History of mammogram 2023    History of Papanicolaou smear of cervix 2023    History of seizures     poss seizure - about 2019    Hypertension     Osteopenia        Past Surgical History:   Procedure Laterality Date    BUNIONECTOMY Right      SECTION      x3

## 2024-10-23 ASSESSMENT — ENCOUNTER SYMPTOMS: BACK PAIN: 1

## 2024-10-24 ENCOUNTER — OFFICE VISIT (OUTPATIENT)
Dept: INTERNAL MEDICINE CLINIC | Facility: CLINIC | Age: 69
End: 2024-10-24

## 2024-10-24 VITALS
BODY MASS INDEX: 27.14 KG/M2 | SYSTOLIC BLOOD PRESSURE: 180 MMHG | TEMPERATURE: 98.1 F | HEART RATE: 75 BPM | OXYGEN SATURATION: 100 % | RESPIRATION RATE: 16 BRPM | DIASTOLIC BLOOD PRESSURE: 100 MMHG | HEIGHT: 64 IN | WEIGHT: 159 LBS

## 2024-10-24 DIAGNOSIS — M54.42 ACUTE LEFT-SIDED LOW BACK PAIN WITH LEFT-SIDED SCIATICA: ICD-10-CM

## 2024-10-24 DIAGNOSIS — I10 PRIMARY HYPERTENSION: Primary | ICD-10-CM

## 2024-10-24 DIAGNOSIS — R73.03 PREDIABETES: ICD-10-CM

## 2024-10-24 DIAGNOSIS — R79.9 ABNORMAL BLOOD CHEMISTRY: ICD-10-CM

## 2024-10-24 LAB
BILIRUBIN, URINE, POC: ABNORMAL
BLOOD URINE, POC: NEGATIVE
GLUCOSE URINE, POC: NEGATIVE
HBA1C MFR BLD: 6.2 %
KETONES, URINE, POC: NEGATIVE
LEUKOCYTE ESTERASE, URINE, POC: NEGATIVE
NITRITE, URINE, POC: NEGATIVE
PH, URINE, POC: 5.5 (ref 4.6–8)
PROTEIN,URINE, POC: ABNORMAL
SPECIFIC GRAVITY, URINE, POC: 1.03 (ref 1–1.03)
URINALYSIS CLARITY, POC: ABNORMAL
URINALYSIS COLOR, POC: ABNORMAL
UROBILINOGEN, POC: ABNORMAL

## 2024-10-24 RX ORDER — GABAPENTIN 300 MG/1
300 CAPSULE ORAL NIGHTLY
Qty: 90 CAPSULE | Refills: 1 | Status: SHIPPED | OUTPATIENT
Start: 2024-10-24 | End: 2025-04-22

## 2024-10-24 RX ORDER — BETAMETHASONE SODIUM PHOSPHATE AND BETAMETHASONE ACETATE 3; 3 MG/ML; MG/ML
6 INJECTION, SUSPENSION INTRA-ARTICULAR; INTRALESIONAL; INTRAMUSCULAR; SOFT TISSUE ONCE
Status: COMPLETED | OUTPATIENT
Start: 2024-10-24 | End: 2024-10-24

## 2024-10-24 RX ORDER — CARVEDILOL 12.5 MG/1
12.5 TABLET ORAL 2 TIMES DAILY
Qty: 180 TABLET | Refills: 1 | Status: SHIPPED | OUTPATIENT
Start: 2024-10-24

## 2024-10-24 RX ORDER — LISINOPRIL 40 MG/1
40 TABLET ORAL DAILY
Qty: 90 TABLET | Refills: 1 | Status: SHIPPED | OUTPATIENT
Start: 2024-10-24

## 2024-10-24 RX ADMIN — BETAMETHASONE SODIUM PHOSPHATE AND BETAMETHASONE ACETATE 6 MG: 3; 3 INJECTION, SUSPENSION INTRA-ARTICULAR; INTRALESIONAL; INTRAMUSCULAR; SOFT TISSUE at 17:15

## 2024-10-24 ASSESSMENT — ENCOUNTER SYMPTOMS
COUGH: 0
SHORTNESS OF BREATH: 0
NAUSEA: 0
SHORTNESS OF BREATH: 0
ABDOMINAL PAIN: 0
SINUS PRESSURE: 0
BACK PAIN: 1
ABDOMINAL PAIN: 0
VOMITING: 0
COUGH: 0
CHEST TIGHTNESS: 0
NAUSEA: 0
VOMITING: 0
BLOOD IN STOOL: 0
DIARRHEA: 0
RHINORRHEA: 0

## 2024-10-24 NOTE — PROGRESS NOTES
[facility]      10/24/2024    Patient Name: Bonny Jensen  :  1955    Subjective:    Chief Complaint:  Chief Complaint   Patient presents with    Follow-up     Pt is here for follow up for blood pressure recheck.          HPI Here for f/u on HTN; c/o low back pain radiating to her L groin for almost a week; c/o numbness in her L leg; lumbar MRI ordered at recent appointment with Molly Magaña NP 2 days ago; MRI scheduled 24; lumbar xray was ordered for her in July, but she did not get this done due to transportation issues, will try to get this done today;   She has only been taking Amlodipine 5 mg qd, stopped Lisinopril 40 mg qd and Coreg 12.5, as she was not sure if she should keep taking;   HTN:  Patient compliant with taking blood pressure medications: Yes  Discussed importance of following low sodium DASH diet, increasing physical activity, taking medications as ordered, decreasing alcohol intake, keeping f/u visits to recheck blood pressure, monitoring blood pressure at home and keeping a log, with goal blood pressure <140/90.  Home bp readings are: 149/77      Past Medical History:   Diagnosis Date    Allergic rhinitis     Anxiety and depression     Cervical radiculopathy     COPD (chronic obstructive pulmonary disease) (HCC)     no home O2    High cholesterol     History of bone density study 2023    osteopenia    History of mammogram 2023    History of Papanicolaou smear of cervix 2023    History of seizures     poss seizure - about     Hypertension     Osteopenia        Past Surgical History:   Procedure Laterality Date    BUNIONECTOMY Right      SECTION      x3    COLONOSCOPY  2016    s/p polypectomy    DILATION AND CURETTAGE OF UTERUS N/A 2023    HYSTEROSCOPY DILATATION CURETTAGE WITH MYOSURE performed by Jacinta Shrestha MD at [facility] MA[facility] OR    WRIST SURGERY Left        Family History   Problem Relation Age of Onset    Stroke Mother     Hypertension Mother

## 2024-10-29 ENCOUNTER — HOSPITAL ENCOUNTER (OUTPATIENT)
Dept: MRI IMAGING | Age: 69
Discharge: HOME | End: 2024-11-01
Payer: MEDICARE

## 2024-10-29 DIAGNOSIS — G89.29 CHRONIC LEFT-SIDED LOW BACK PAIN WITH LEFT-SIDED SCIATICA: ICD-10-CM

## 2024-10-29 DIAGNOSIS — M54.42 CHRONIC LEFT-SIDED LOW BACK PAIN WITH LEFT-SIDED SCIATICA: ICD-10-CM

## 2024-10-29 PROCEDURE — 72148 MRI LUMBAR SPINE W/O DYE: CPT

## 2024-10-31 ENCOUNTER — TELEPHONE (OUTPATIENT)
Dept: INTERNAL MEDICINE CLINIC | Facility: CLINIC | Age: 69
End: 2024-10-31

## 2024-10-31 DIAGNOSIS — M54.42 CHRONIC LEFT-SIDED LOW BACK PAIN WITH LEFT-SIDED SCIATICA: ICD-10-CM

## 2024-10-31 DIAGNOSIS — G89.29 CHRONIC LEFT-SIDED LOW BACK PAIN WITH LEFT-SIDED SCIATICA: ICD-10-CM

## 2024-10-31 RX ORDER — TRAMADOL HYDROCHLORIDE 50 MG/1
50 TABLET ORAL EVERY 8 HOURS PRN
Qty: 15 TABLET | Refills: 0 | Status: SHIPPED | OUTPATIENT
Start: 2024-10-31 | End: 2024-11-05

## 2024-10-31 NOTE — TELEPHONE ENCOUNTER
Patient needs to know if she would benefit from using a cane. And she needs a refill on her tramadol for pain, I do not see that you wrote a prescription for this.  Thank you, Lynn

## 2024-11-04 ENCOUNTER — TELEPHONE (OUTPATIENT)
Dept: INTERNAL MEDICINE CLINIC | Facility: CLINIC | Age: 69
End: 2024-11-04

## 2024-11-04 DIAGNOSIS — M51.362 DEGENERATION OF INTERVERTEBRAL DISC OF LUMBAR REGION WITH DISCOGENIC BACK PAIN AND LOWER EXTREMITY PAIN: ICD-10-CM

## 2024-11-04 DIAGNOSIS — M48.062 SPINAL STENOSIS OF LUMBAR REGION WITH NEUROGENIC CLAUDICATION: Primary | ICD-10-CM

## 2024-11-04 NOTE — TELEPHONE ENCOUNTER
She saw Molly Magaña NP last month, who ordered the test; MRI revealed:  1.  Multilevel, multifactorial degenerative changes of the lumbar spine as  detailed above, with severe spondylosis at L2-3 with mild endplate marrow edema.  2.  At L2-3, circumferential disc osteophyte complex with bilateral facet joint  arthropathy/hypertrophy and ligamentum flavum thickening results in moderate to  severe spinal canal stenosis as well as severe left and moderate right  neuroforaminal stenosis. Additional nodule measuring 1 x 0.8 mm located in the  left interspace between the L1-2 and L2-3 disc spaces at the level of the left  L2 pedicle, concerning for sequestered disc fragment which would be compressing  the exiting left L2 nerve root. Differential diagnosis includes enlarged left L2  nerve root or nerve sheath tumor.  3.  Large circumferential disc bulge at L3-4 asymmetric to the right results in  severe lateral recess stenosis and severe right neuroforaminal stenosis as well  as moderate to severe spinal canal stenosis. Moderate left neuroforaminal  stenosis.  4.  Moderate to severe bilateral neuroforaminal stenosis at L4-5 with mild to  moderate spinal canal stenosis.  5.  Mild circumferential disc bulge at L5-S1 with small central annular fissure  resulting in mild bilateral neuroforaminal narrowing.    She has spinal stenosis, bulging discs in lumbar spine; she needs to see Neurosurgery ASAP, referral ordered; she can schedule visit, first available to review report in detail;

## 2024-11-04 NOTE — TELEPHONE ENCOUNTER
Pt asked that you go over her MRI as she does not understand it and would like to know what is going on  Thank you, Lynn

## 2024-11-05 DIAGNOSIS — M47.816 OSTEOARTHRITIS OF LUMBAR SPINE, UNSPECIFIED SPINAL OSTEOARTHRITIS COMPLICATION STATUS: ICD-10-CM

## 2024-11-05 DIAGNOSIS — M48.062 SPINAL STENOSIS OF LUMBAR REGION WITH NEUROGENIC CLAUDICATION: Primary | ICD-10-CM

## 2024-11-09 NOTE — PROGRESS NOTES
inhibit wound healing and I recommend patient quit smoking.  We discussed surgery in detail as well as went over the risk and benefit of surgery.  We also discussed patient's degenerative changes and her risk for developing worsening degenerative disc disease.  At this point I think addressing patient's current problem will help relieve her pain.    -Lumbar Laminectomy: We discussed the details of the surgery including a midline incision in over the low back followed by dissection to the area of stenosis.  The nerves would be freed up by trimming any impinging structures including ligaments and bone.  We would also explore the left L2 nerve roots to make sure the impingement is either a herniated disc or is a nerve sheath tumor.  If it can be safely resected we will otherwise we will decompress.  Once the nerves are freed the wound would be closed with suture and covered with sterile dressings.  Patient would expect to stay in the hospital 1-2 days until she can get about safely with minimal assistance.  Follow-up would be scheduled for 2-3 weeks and she would have restrictions including no driving, and no lifting greater than 15 lbs until follow up with me.  We also discussed the potential risks of the surgery including, but not limited to infection, spinal fluid leak and potential headaches requiring her to remain supine post-operatively; injury to the cauda equina or peripheral nerve root resulting in weakness, numbness, or very rarely bowel or bladder dysfunction; persistent back or leg symptoms, recurrence of stenosis or the development of instability possibly needing additional surgery;  blood loss and/or hematoma; and the risks of anesthesia. The patient voiced an understanding of these issues as outlined.      The surgery that I believe would be most beneficial here is a lumbar 2 to lumbar 5 laminectomy for decompression and lumbar 2 to lumbar 3 discectomy versus resection of nerve sheath tumor.  We will

## 2024-11-11 DIAGNOSIS — M54.12 CERVICAL MYELOPATHY WITH CERVICAL RADICULOPATHY (HCC): ICD-10-CM

## 2024-11-11 DIAGNOSIS — G95.9 CERVICAL MYELOPATHY WITH CERVICAL RADICULOPATHY (HCC): ICD-10-CM

## 2024-11-11 DIAGNOSIS — G89.29 CHRONIC LEFT-SIDED LOW BACK PAIN WITH LEFT-SIDED SCIATICA: ICD-10-CM

## 2024-11-11 DIAGNOSIS — M54.42 CHRONIC LEFT-SIDED LOW BACK PAIN WITH LEFT-SIDED SCIATICA: ICD-10-CM

## 2024-11-11 NOTE — TELEPHONE ENCOUNTER
Medication Refill Request    Name of Medication : methocarbamol    Strength of Medication: 750 mg    Directions: 3 times daily    30 day or 90 day supply: Pt requ 90 day supply    [facility] Date: 10[facility]24[facility]    Next Appt. Date: 1/8/25    Additional Information For Provider:     Medication Refill Request    Name of Medication : tramadol    Strength of Medication: 50 mg    Directions: 3 times daily    30 day or 90 day supply: pt requ 90 day supply    Preferred Pharmacy: [facility] Appt. Date: 10/24/24    Next Appt. Date: 1/8/25    Additional Information For Provider:

## 2024-11-12 ENCOUNTER — OFFICE VISIT (OUTPATIENT)
Dept: NEUROSURGERY | Age: 69
End: 2024-11-12

## 2024-11-12 ENCOUNTER — HOSPITAL ENCOUNTER (OUTPATIENT)
Dept: GENERAL RADIOLOGY | Age: 69
Discharge: HOME | End: 2024-11-15
Payer: MEDICARE

## 2024-11-12 VITALS
HEIGHT: 64 IN | SYSTOLIC BLOOD PRESSURE: 139 MMHG | HEART RATE: 44 BPM | DIASTOLIC BLOOD PRESSURE: 66 MMHG | WEIGHT: 157.8 LBS | TEMPERATURE: 97.1 F | BODY MASS INDEX: 26.94 KG/M2 | OXYGEN SATURATION: 96 %

## 2024-11-12 DIAGNOSIS — M47.816 OSTEOARTHRITIS OF LUMBAR SPINE, UNSPECIFIED SPINAL OSTEOARTHRITIS COMPLICATION STATUS: ICD-10-CM

## 2024-11-12 DIAGNOSIS — R29.818 NEUROGENIC CLAUDICATION: ICD-10-CM

## 2024-11-12 DIAGNOSIS — M51.26 HERNIATED LUMBAR INTERVERTEBRAL DISC: Primary | ICD-10-CM

## 2024-11-12 DIAGNOSIS — D49.2 NERVE SHEATH TUMOR: ICD-10-CM

## 2024-11-12 DIAGNOSIS — M48.062 SPINAL STENOSIS OF LUMBAR REGION WITH NEUROGENIC CLAUDICATION: ICD-10-CM

## 2024-11-12 DIAGNOSIS — M47.9 SPONDYLOSIS: ICD-10-CM

## 2024-11-12 PROCEDURE — 72120 X-RAY BEND ONLY L-S SPINE: CPT

## 2024-11-12 NOTE — TELEPHONE ENCOUNTER
Pt calling in regards to pain medication, says that its usually a 15 day supply sent in and that is not good, she needs it to be longer.

## 2024-11-13 ENCOUNTER — PREP FOR PROCEDURE (OUTPATIENT)
Dept: NEUROSURGERY | Age: 69
End: 2024-11-13

## 2024-11-13 DIAGNOSIS — D36.10 BENIGN NERVE SHEATH NEOPLASM: ICD-10-CM

## 2024-11-13 DIAGNOSIS — M47.9 SPONDYLOSIS: ICD-10-CM

## 2024-11-13 DIAGNOSIS — M51.26 HERNIATED LUMBAR INTERVERTEBRAL DISC: ICD-10-CM

## 2024-11-13 DIAGNOSIS — D36.10 NEUROFIBROMA: ICD-10-CM

## 2024-11-13 DIAGNOSIS — M48.062 NEUROGENIC CLAUDICATION DUE TO LUMBAR SPINAL STENOSIS: ICD-10-CM

## 2024-11-13 RX ORDER — TRAMADOL HYDROCHLORIDE 50 MG/1
50 TABLET ORAL EVERY 8 HOURS PRN
Qty: 90 TABLET | Refills: 0 | Status: SHIPPED | OUTPATIENT
Start: 2024-11-13 | End: 2024-12-13

## 2024-11-13 RX ORDER — METHOCARBAMOL 750 MG/1
750 TABLET, FILM COATED ORAL 4 TIMES DAILY
Qty: 40 TABLET | Refills: 2 | Status: SHIPPED | OUTPATIENT
Start: 2024-11-13 | End: 2024-12-13

## 2024-11-19 ENCOUNTER — OFFICE VISIT (OUTPATIENT)
Dept: INTERNAL MEDICINE CLINIC | Facility: CLINIC | Age: 69
End: 2024-11-19

## 2024-11-19 VITALS
WEIGHT: 160.4 LBS | OXYGEN SATURATION: 97 % | TEMPERATURE: 97.2 F | DIASTOLIC BLOOD PRESSURE: 76 MMHG | BODY MASS INDEX: 27.39 KG/M2 | HEART RATE: 64 BPM | HEIGHT: 64 IN | SYSTOLIC BLOOD PRESSURE: 132 MMHG

## 2024-11-19 DIAGNOSIS — Z87.891 PERSONAL HISTORY OF TOBACCO USE: ICD-10-CM

## 2024-11-19 DIAGNOSIS — Z00.00 MEDICARE ANNUAL WELLNESS VISIT, SUBSEQUENT: Primary | ICD-10-CM

## 2024-11-19 DIAGNOSIS — K63.5 POLYP OF COLON, UNSPECIFIED PART OF COLON, UNSPECIFIED TYPE: ICD-10-CM

## 2024-11-19 DIAGNOSIS — F41.9 ANXIETY AND DEPRESSION: ICD-10-CM

## 2024-11-19 DIAGNOSIS — G40.909 SEIZURE DISORDER (HCC): ICD-10-CM

## 2024-11-19 DIAGNOSIS — I10 PRIMARY HYPERTENSION: ICD-10-CM

## 2024-11-19 DIAGNOSIS — F32.A ANXIETY AND DEPRESSION: ICD-10-CM

## 2024-11-19 DIAGNOSIS — J30.89 ENVIRONMENTAL AND SEASONAL ALLERGIES: ICD-10-CM

## 2024-11-19 DIAGNOSIS — E78.00 HYPERCHOLESTEREMIA: ICD-10-CM

## 2024-11-19 DIAGNOSIS — J43.8 OTHER EMPHYSEMA (HCC): ICD-10-CM

## 2024-11-19 DIAGNOSIS — Z12.31 VISIT FOR SCREENING MAMMOGRAM: ICD-10-CM

## 2024-11-19 DIAGNOSIS — R73.03 PREDIABETES: ICD-10-CM

## 2024-11-19 DIAGNOSIS — M85.89 OSTEOPENIA OF MULTIPLE SITES: ICD-10-CM

## 2024-11-19 DIAGNOSIS — Z11.59 ENCOUNTER FOR HEPATITIS C SCREENING TEST FOR LOW RISK PATIENT: ICD-10-CM

## 2024-11-19 DIAGNOSIS — E55.9 VITAMIN D DEFICIENCY: ICD-10-CM

## 2024-11-19 DIAGNOSIS — M51.26 HERNIATED LUMBAR INTERVERTEBRAL DISC: ICD-10-CM

## 2024-11-19 RX ORDER — ALBUTEROL SULFATE 90 UG/1
2 INHALANT RESPIRATORY (INHALATION) EVERY 6 HOURS PRN
Qty: 18 G | Refills: 5 | Status: SHIPPED | OUTPATIENT
Start: 2024-11-19

## 2024-11-19 RX ORDER — FLUTICASONE PROPIONATE AND SALMETEROL 250; 50 UG/1; UG/1
POWDER RESPIRATORY (INHALATION)
Qty: 1 EACH | Refills: 5 | Status: SHIPPED | OUTPATIENT
Start: 2024-11-19

## 2024-11-19 RX ORDER — ATORVASTATIN CALCIUM 40 MG/1
40 TABLET, FILM COATED ORAL EVERY EVENING
Qty: 90 TABLET | Refills: 3 | Status: SHIPPED | OUTPATIENT
Start: 2024-11-19

## 2024-11-19 RX ORDER — FLUOXETINE 40 MG/1
40 CAPSULE ORAL EVERY EVENING
Qty: 90 CAPSULE | Refills: 3 | Status: SHIPPED | OUTPATIENT
Start: 2024-11-19

## 2024-11-19 RX ORDER — AMLODIPINE BESYLATE 5 MG/1
5 TABLET ORAL DAILY
Qty: 90 TABLET | Refills: 3 | Status: SHIPPED | OUTPATIENT
Start: 2024-11-19

## 2024-11-19 RX ORDER — FEXOFENADINE HCL 180 MG/1
180 TABLET ORAL DAILY
Qty: 90 TABLET | Refills: 3 | Status: SHIPPED | OUTPATIENT
Start: 2024-11-19

## 2024-11-19 ASSESSMENT — ENCOUNTER SYMPTOMS
BLOOD IN STOOL: 0
SINUS PRESSURE: 0
NAUSEA: 0
ABDOMINAL PAIN: 0
VOMITING: 0
CHEST TIGHTNESS: 0
DIARRHEA: 0
SHORTNESS OF BREATH: 0
COUGH: 0
RHINORRHEA: 0

## 2024-11-19 ASSESSMENT — PATIENT HEALTH QUESTIONNAIRE - PHQ9
SUM OF ALL RESPONSES TO PHQ QUESTIONS 1-9: 1
2. FEELING DOWN, DEPRESSED OR HOPELESS: NOT AT ALL
10. IF YOU CHECKED OFF ANY PROBLEMS, HOW DIFFICULT HAVE THESE PROBLEMS MADE IT FOR YOU TO DO YOUR WORK, TAKE CARE OF THINGS AT HOME, OR GET ALONG WITH OTHER PEOPLE: NOT DIFFICULT AT ALL
8. MOVING OR SPEAKING SO SLOWLY THAT OTHER PEOPLE COULD HAVE NOTICED. OR THE OPPOSITE, BEING SO FIGETY OR RESTLESS THAT YOU HAVE BEEN MOVING AROUND A LOT MORE THAN USUAL: NOT AT ALL
SUM OF ALL RESPONSES TO PHQ QUESTIONS 1-9: 1
7. TROUBLE CONCENTRATING ON THINGS, SUCH AS READING THE NEWSPAPER OR WATCHING TELEVISION: NOT AT ALL
4. FEELING TIRED OR HAVING LITTLE ENERGY: SEVERAL DAYS
5. POOR APPETITE OR OVEREATING: NOT AT ALL
6. FEELING BAD ABOUT YOURSELF - OR THAT YOU ARE A FAILURE OR HAVE LET YOURSELF OR YOUR FAMILY DOWN: NOT AT ALL
SUM OF ALL RESPONSES TO PHQ9 QUESTIONS 1 & 2: 0
3. TROUBLE FALLING OR STAYING ASLEEP: NOT AT ALL
SUM OF ALL RESPONSES TO PHQ QUESTIONS 1-9: 1
1. LITTLE INTEREST OR PLEASURE IN DOING THINGS: NOT AT ALL
SUM OF ALL RESPONSES TO PHQ QUESTIONS 1-9: 1
9. THOUGHTS THAT YOU WOULD BE BETTER OFF DEAD, OR OF HURTING YOURSELF: NOT AT ALL

## 2024-11-19 ASSESSMENT — LIFESTYLE VARIABLES
HOW OFTEN DO YOU HAVE A DRINK CONTAINING ALCOHOL: MONTHLY OR LESS
HOW MANY STANDARD DRINKS CONTAINING ALCOHOL DO YOU HAVE ON A TYPICAL DAY: PATIENT DECLINED

## 2024-11-19 NOTE — PROGRESS NOTES
[facility]      2024    Patient Name: Bonny Jensen  :  1955    Subjective:    Chief Complaint:  Chief Complaint   Patient presents with    Medicare AWV         HPI Here for Medicare Wellness visit; patient had fasting labs done recently and results were reviewed in detail today;   She has hx of herniated lumbar intervertebral disc, saw Dr. Maddox last week; L2-L5 laminectomy for decompression and L2-3 discectomy vx resection of nerve sheath tumor recommended; smoking cessation recommended; records reviewed; surgery scheduled for next month;   She is still smoking, down to half pack per day and working on quitting; she is agreeable to proceed w/ LDCT for lung cancer screening;   She has osteopenia, plans to start back on calcium and vitamin D supplement;   Mammogram: 2023  Pap smear: 2024  Bone density: 2023, osteopenia  Colonoscopy:   Eye exam:   Dental exam:   Pneumovax 20: 2023  Prevnar 13:  Shingrix:   Tdap:   Fluvax: declined  Moderna Covid 19: 21, 3/7/21, 22  HTN:  Patient compliant with taking blood pressure medications: Yes  Discussed importance of following low sodium DASH diet, increasing physical activity, taking medications as ordered, decreasing alcohol intake, keeping f/u visits to recheck blood pressure, monitoring blood pressure at home and keeping a log, with goal blood pressure <140/90.  Home bp readings are: good      Past Medical History:   Diagnosis Date    Allergic rhinitis     Anxiety and depression     Cervical radiculopathy     COPD (chronic obstructive pulmonary disease) (HCC)     no home O2    High cholesterol     History of bone density study 2023    osteopenia    History of mammogram 2023    History of Papanicolaou smear of cervix 2023    History of seizures     poss seizure - about 2019    Hypertension     Osteopenia        Past Surgical History:   Procedure Laterality Date    BUNIONECTOMY Right      SECTION

## 2024-11-19 NOTE — PATIENT INSTRUCTIONS
Learning About Being Active as an Older Adult  Why is being active important as you get older?     Being active is one of the best things you can do for your health. And it's never too late to start. Being active--or getting active, if you aren't already--has definite benefits. It can:  Give you more energy,  Keep your mind sharp.  Improve balance to reduce your risk of falls.  Help you manage chronic illness with fewer medicines.  No matter how old you are, how fit you are, or what health problems you have, there is a form of activity that will work for you. And the more physical activity you can do, the better your overall health will be.  What kinds of activity can help you stay healthy?  Being more active will make your daily activities easier. Physical activity includes planned exercise and things you do in daily life. There are four types of activity:  Aerobic.  Doing aerobic activity makes your heart and lungs strong.  Includes walking, dancing, and gardening.  Aim for at least 2½ hours spread throughout the week.  It improves your energy and can help you sleep better.  Muscle-strengthening.  This type of activity can help maintain muscle and strengthen bones.  Includes climbing stairs, using resistance bands, and lifting or carrying heavy loads.  Aim for at least twice a week.  It can help protect the knees and other joints.  Stretching.  Stretching gives you better range of motion in joints and muscles.  Includes upper arm stretches, calf stretches, and gentle yoga.  Aim for at least twice a week, preferably after your muscles are warmed up from other activities.  It can help you function better in daily life.  Balancing.  This helps you stay coordinated and have good posture.  Includes heel-to-toe walking, colin chi, and certain types of yoga.  Aim for at least 3 days a week.  It can reduce your risk of falling.  Even if you have a hard time meeting the recommendations, it's better to be more active

## 2024-12-05 ENCOUNTER — HOSPITAL ENCOUNTER (OUTPATIENT)
Dept: SURGERY | Age: 69
Discharge: HOME | End: 2024-12-08
Payer: MEDICARE

## 2024-12-05 VITALS
HEART RATE: 72 BPM | TEMPERATURE: 97.7 F | RESPIRATION RATE: 18 BRPM | DIASTOLIC BLOOD PRESSURE: 70 MMHG | HEIGHT: 63 IN | SYSTOLIC BLOOD PRESSURE: 125 MMHG | WEIGHT: 162.5 LBS | BODY MASS INDEX: 28.79 KG/M2 | OXYGEN SATURATION: 92 %

## 2024-12-05 LAB — HGB BLD-MCNC: 11.8 G/DL (ref 11.7–15.4)

## 2024-12-05 PROCEDURE — 85018 HEMOGLOBIN: CPT

## 2024-12-05 PROCEDURE — 87641 MR-STAPH DNA AMP PROBE: CPT

## 2024-12-05 ASSESSMENT — PAIN DESCRIPTION - DESCRIPTORS: DESCRIPTORS: NUMBNESS;TINGLING;ACHING

## 2024-12-05 ASSESSMENT — PAIN DESCRIPTION - PAIN TYPE: TYPE: CHRONIC PAIN

## 2024-12-05 ASSESSMENT — PAIN DESCRIPTION - ORIENTATION: ORIENTATION: LOWER

## 2024-12-05 ASSESSMENT — PAIN DESCRIPTION - LOCATION: LOCATION: BACK

## 2024-12-05 ASSESSMENT — PAIN DESCRIPTION - DIRECTION: RADIATING_TOWARDS: HIP AND LEGS

## 2024-12-05 ASSESSMENT — PAIN - FUNCTIONAL ASSESSMENT: PAIN_FUNCTIONAL_ASSESSMENT: INTOLERABLE, UNABLE TO DO ANY ACTIVE OR PASSIVE ACTIVITIES

## 2024-12-05 ASSESSMENT — PAIN SCALES - GENERAL: PAINLEVEL_OUTOF10: 10

## 2024-12-05 ASSESSMENT — PAIN DESCRIPTION - FREQUENCY: FREQUENCY: INTERMITTENT

## 2024-12-05 NOTE — PRE-PROCEDURE INSTRUCTIONS
PLEASE CONTINUE TAKING ALL PRESCRIPTION MEDICATIONS UP TO THE DAY OF SURGERY UNLESS OTHERWISE DIRECTED BELOW. You may take Tylenol, allergy,  and/or indigestion medications.     TAKE ONLY THESE MEDICATIONS ON THE DAY OF SURGERY   TRAMADOL IF NEEDED  BRING ALBUTEROL INHALER (PROVENTIL) DAY OF SURGERY   ADVAIR INHALER  FLUOXETINE (PROZAC)   CARVEDILOL  AMLODIPINE     DISCONTINUE all vitamins and supplements 7 days prior to surgery. DISCONTINUE Non-Steroidal Anti-Inflammatory (NSAIDS) such as Advil and Aleve 5 days prior to surgery.     Home Medications to Hold- please continue all other medications except these.    HOLD ALL VITAMINS 7 DAYS PRIOR TO SURGERY    HOLD MELOXICAM 5 DAYS PRIOR TO SURGERY     HOLD LISINOPRIL MORNING OF SURGERY    HOLD METHOCARBAMOL (ROBAXIN ) MORNING OF SURGERY     Comments   Please drink 32 ounces of non-caffeinated clear liquids 2 hours prior to your arrival to avoid dehydration.      On the day before surgery please take 2 Tylenol in the morning and then again before bed. You may use either regular or extra strength. 12/19/2024          Please do not bring home medications with you on the day of surgery unless otherwise directed by your nurse.  If you are instructed to bring home medications, please give them to your nurse as they will be administered by the nursing staff.    If you have any questions, please call [facility] [phone].    A copy of this note was provided to the patient for reference.

## 2024-12-05 NOTE — PRE-PROCEDURE INSTRUCTIONS
Patient verified name, , and surgery as listed in [facility]. Patient provided medical/health information and PTA medications to the best of their ability.    TYPE  CASE: 2  Orders per surgeon: were  received per protocol  Labs per surgeon: hgb and mrsa collected and results in EPIC. Pt on DOS  Labs per anesthesia protocol: no additional  EKG:  not required per protocol     MRSA/MSSA swab collected; pharmacy to review and dose antibiotic as appropriate.     Patient provided with and instructed on education handouts including Guide to Surgery,  Preventing Infections, Pain Management, and [facility] brochure.     Road to Recovery Spine surgery patient guide given. Instructed on incentive spirometry   Surgical skin cleanser and instructions given per hospital policy.    Original medication prescription bottles not visualized during patient appointment.     Patient teach back successful and patient demonstrates knowledge of instruction.

## 2024-12-06 LAB
MRSA DNA SPEC QL NAA+PROBE: NOT DETECTED
S AUREUS CPE NOSE QL NAA+PROBE: NOT DETECTED

## 2024-12-10 ENCOUNTER — OFFICE VISIT (OUTPATIENT)
Dept: NEUROSURGERY | Age: 69
End: 2024-12-10
Payer: MEDICARE

## 2024-12-10 VITALS — WEIGHT: 161 LBS | HEIGHT: 63 IN | BODY MASS INDEX: 28.53 KG/M2 | TEMPERATURE: 97.6 F

## 2024-12-10 DIAGNOSIS — M51.26 HNP (HERNIATED NUCLEUS PULPOSUS), LUMBAR: ICD-10-CM

## 2024-12-10 DIAGNOSIS — M47.9 SPONDYLOSIS: ICD-10-CM

## 2024-12-10 DIAGNOSIS — D49.2 NERVE SHEATH TUMOR: ICD-10-CM

## 2024-12-10 DIAGNOSIS — M48.062 SPINAL STENOSIS OF LUMBAR REGION WITH NEUROGENIC CLAUDICATION: Primary | ICD-10-CM

## 2024-12-10 PROCEDURE — 99214 OFFICE O/P EST MOD 30 MIN: CPT | Performed by: NURSE PRACTITIONER

## 2024-12-10 PROCEDURE — G8427 DOCREV CUR MEDS BY ELIG CLIN: HCPCS | Performed by: NURSE PRACTITIONER

## 2024-12-10 PROCEDURE — G8484 FLU IMMUNIZE NO ADMIN: HCPCS | Performed by: NURSE PRACTITIONER

## 2024-12-10 PROCEDURE — 1123F ACP DISCUSS/DSCN MKR DOCD: CPT | Performed by: NURSE PRACTITIONER

## 2024-12-10 PROCEDURE — 1090F PRES/ABSN URINE INCON ASSESS: CPT | Performed by: NURSE PRACTITIONER

## 2024-12-10 PROCEDURE — G8399 PT W/DXA RESULTS DOCUMENT: HCPCS | Performed by: NURSE PRACTITIONER

## 2024-12-10 PROCEDURE — 3017F COLORECTAL CA SCREEN DOC REV: CPT | Performed by: NURSE PRACTITIONER

## 2024-12-10 PROCEDURE — 1160F RVW MEDS BY RX/DR IN RCRD: CPT | Performed by: NURSE PRACTITIONER

## 2024-12-10 PROCEDURE — 4004F PT TOBACCO SCREEN RCVD TLK: CPT | Performed by: NURSE PRACTITIONER

## 2024-12-10 PROCEDURE — G8417 CALC BMI ABV UP PARAM F/U: HCPCS | Performed by: NURSE PRACTITIONER

## 2024-12-10 PROCEDURE — 1159F MED LIST DOCD IN RCRD: CPT | Performed by: NURSE PRACTITIONER

## 2024-12-10 RX ORDER — SODIUM CHLORIDE 0.9 % (FLUSH) 0.9 %
5-40 SYRINGE (ML) INJECTION PRN
OUTPATIENT
Start: 2024-12-10

## 2024-12-10 RX ORDER — SODIUM CHLORIDE 9 MG/ML
INJECTION, SOLUTION INTRAVENOUS PRN
OUTPATIENT
Start: 2024-12-10

## 2024-12-10 RX ORDER — SODIUM CHLORIDE 0.9 % (FLUSH) 0.9 %
5-40 SYRINGE (ML) INJECTION EVERY 12 HOURS SCHEDULED
OUTPATIENT
Start: 2024-12-10

## 2024-12-10 NOTE — PROGRESS NOTES
explain the patient's current symptoms.  Patient's symptoms do correlate with the above findings on the MRI.  Patient will keep her already scheduled postoperative follow-up appointment after surgery.    Total 30 minutes was spent in chart review, patient consultation, and documentation.  No diagnosis found.    Notes are transcribed with [employer], a medical voice recording dictation service, and may contain minor errors.    Kylah Alvarez NP  [facility]  [facility]

## 2024-12-18 ENCOUNTER — TELEPHONE (OUTPATIENT)
Dept: NEUROSURGERY | Age: 69
End: 2024-12-18

## 2024-12-18 PROBLEM — D36.10: Status: ACTIVE | Noted: 2024-11-13

## 2024-12-18 NOTE — TELEPHONE ENCOUNTER
Called pt to let her know Dr. Maddox is having a procedure done tomorrow so was having to resched sx to next thurs 12/26. Pt was very upset saying she already made arrangements, I did apologize and let pt know I could move it to next week. She refused and said she would need to think about having surgery now. I did let pt now I would have to cx for now, but to call if she decides to proceed. Pt was in understanding

## 2024-12-19 NOTE — TELEPHONE ENCOUNTER
----- Message from Kerry Oshea MA sent at 3/17/2023 10:55 AM EDT -----  Regarding: FW: Hydrocodone    ----- Message -----  From: Miguel Mcguire  Sent: 3/16/2023   7:24 PM EDT  To: [facility] [address] [place] [address] Clinical Staff  Subject: Hydrocodone                                      I got all three but I need to know how to take them do I take them tonight before on that Monday or do I take them that Tuesday morning the 21st <--- Click to Launch ICDx for PreOp, PostOp and Procedure

## 2024-12-25 ENCOUNTER — ANESTHESIA EVENT (OUTPATIENT)
Dept: SURGERY | Age: 69
End: 2024-12-25
Payer: MEDICARE

## 2024-12-26 ENCOUNTER — APPOINTMENT (OUTPATIENT)
Dept: GENERAL RADIOLOGY | Age: 69
DRG: 520 | End: 2024-12-26
Attending: STUDENT IN AN ORGANIZED HEALTH CARE EDUCATION/TRAINING PROGRAM
Payer: MEDICARE

## 2024-12-26 ENCOUNTER — ANESTHESIA (OUTPATIENT)
Dept: SURGERY | Age: 69
End: 2024-12-26
Payer: MEDICARE

## 2024-12-26 ENCOUNTER — HOSPITAL ENCOUNTER (INPATIENT)
Age: 69
LOS: 1 days | Discharge: NURSING, REHAB OR LONG-TERM CARE | DRG: 520 | End: 2025-01-01
Attending: STUDENT IN AN ORGANIZED HEALTH CARE EDUCATION/TRAINING PROGRAM | Admitting: STUDENT IN AN ORGANIZED HEALTH CARE EDUCATION/TRAINING PROGRAM
Payer: MEDICARE

## 2024-12-26 DIAGNOSIS — M51.26 HERNIATION OF INTERVERTEBRAL DISC OF LUMBAR REGION: ICD-10-CM

## 2024-12-26 DIAGNOSIS — D36.10 BENIGN NERVE SHEATH NEOPLASM: ICD-10-CM

## 2024-12-26 DIAGNOSIS — M51.26 HERNIATED LUMBAR INTERVERTEBRAL DISC: ICD-10-CM

## 2024-12-26 DIAGNOSIS — M48.062 NEUROGENIC CLAUDICATION DUE TO LUMBAR SPINAL STENOSIS: Primary | ICD-10-CM

## 2024-12-26 DIAGNOSIS — M47.9 SPONDYLOSIS: ICD-10-CM

## 2024-12-26 DIAGNOSIS — D36.10 NEUROFIBROMA: ICD-10-CM

## 2024-12-26 PROCEDURE — 6370000000 HC RX 637 (ALT 250 FOR IP): Performed by: ANESTHESIOLOGY

## 2024-12-26 PROCEDURE — G0378 HOSPITAL OBSERVATION PER HR: HCPCS

## 2024-12-26 PROCEDURE — 6360000002 HC RX W HCPCS: Performed by: STUDENT IN AN ORGANIZED HEALTH CARE EDUCATION/TRAINING PROGRAM

## 2024-12-26 PROCEDURE — 3600000004 HC SURGERY LEVEL 4 BASE: Performed by: STUDENT IN AN ORGANIZED HEALTH CARE EDUCATION/TRAINING PROGRAM

## 2024-12-26 PROCEDURE — 99024 POSTOP FOLLOW-UP VISIT: CPT | Performed by: STUDENT IN AN ORGANIZED HEALTH CARE EDUCATION/TRAINING PROGRAM

## 2024-12-26 PROCEDURE — 01NB0ZZ RELEASE LUMBAR NERVE, OPEN APPROACH: ICD-10-PCS | Performed by: STUDENT IN AN ORGANIZED HEALTH CARE EDUCATION/TRAINING PROGRAM

## 2024-12-26 PROCEDURE — 3700000000 HC ANESTHESIA ATTENDED CARE: Performed by: STUDENT IN AN ORGANIZED HEALTH CARE EDUCATION/TRAINING PROGRAM

## 2024-12-26 PROCEDURE — 63048 LAM FACETEC &FORAMOT EA ADDL: CPT | Performed by: STUDENT IN AN ORGANIZED HEALTH CARE EDUCATION/TRAINING PROGRAM

## 2024-12-26 PROCEDURE — 63047 LAM FACETEC & FORAMOT LUMBAR: CPT | Performed by: STUDENT IN AN ORGANIZED HEALTH CARE EDUCATION/TRAINING PROGRAM

## 2024-12-26 PROCEDURE — 3700000001 HC ADD 15 MINUTES (ANESTHESIA): Performed by: STUDENT IN AN ORGANIZED HEALTH CARE EDUCATION/TRAINING PROGRAM

## 2024-12-26 PROCEDURE — 2500000003 HC RX 250 WO HCPCS: Performed by: STUDENT IN AN ORGANIZED HEALTH CARE EDUCATION/TRAINING PROGRAM

## 2024-12-26 PROCEDURE — 2580000003 HC RX 258

## 2024-12-26 PROCEDURE — 3600000014 HC SURGERY LEVEL 4 ADDTL 15MIN: Performed by: STUDENT IN AN ORGANIZED HEALTH CARE EDUCATION/TRAINING PROGRAM

## 2024-12-26 PROCEDURE — 7100000000 HC PACU RECOVERY - FIRST 15 MIN: Performed by: STUDENT IN AN ORGANIZED HEALTH CARE EDUCATION/TRAINING PROGRAM

## 2024-12-26 PROCEDURE — 2580000003 HC RX 258: Performed by: ANESTHESIOLOGY

## 2024-12-26 PROCEDURE — 6360000002 HC RX W HCPCS: Performed by: ANESTHESIOLOGY

## 2024-12-26 PROCEDURE — 2720000010 HC SURG SUPPLY STERILE: Performed by: STUDENT IN AN ORGANIZED HEALTH CARE EDUCATION/TRAINING PROGRAM

## 2024-12-26 PROCEDURE — 0SB20ZZ EXCISION OF LUMBAR VERTEBRAL DISC, OPEN APPROACH: ICD-10-PCS | Performed by: STUDENT IN AN ORGANIZED HEALTH CARE EDUCATION/TRAINING PROGRAM

## 2024-12-26 PROCEDURE — 6370000000 HC RX 637 (ALT 250 FOR IP): Performed by: STUDENT IN AN ORGANIZED HEALTH CARE EDUCATION/TRAINING PROGRAM

## 2024-12-26 PROCEDURE — 2500000003 HC RX 250 WO HCPCS

## 2024-12-26 PROCEDURE — 2709999900 HC NON-CHARGEABLE SUPPLY: Performed by: STUDENT IN AN ORGANIZED HEALTH CARE EDUCATION/TRAINING PROGRAM

## 2024-12-26 PROCEDURE — 6360000002 HC RX W HCPCS

## 2024-12-26 PROCEDURE — 7100000001 HC PACU RECOVERY - ADDTL 15 MIN: Performed by: STUDENT IN AN ORGANIZED HEALTH CARE EDUCATION/TRAINING PROGRAM

## 2024-12-26 RX ORDER — SODIUM CHLORIDE 0.9 % (FLUSH) 0.9 %
5-40 SYRINGE (ML) INJECTION PRN
Status: DISCONTINUED | OUTPATIENT
Start: 2024-12-26 | End: 2025-01-01 | Stop reason: HOSPADM

## 2024-12-26 RX ORDER — SODIUM CHLORIDE, SODIUM LACTATE, POTASSIUM CHLORIDE, CALCIUM CHLORIDE 600; 310; 30; 20 MG/100ML; MG/100ML; MG/100ML; MG/100ML
INJECTION, SOLUTION INTRAVENOUS CONTINUOUS
Status: DISCONTINUED | OUTPATIENT
Start: 2024-12-26 | End: 2024-12-26 | Stop reason: HOSPADM

## 2024-12-26 RX ORDER — GABAPENTIN 300 MG/1
300 CAPSULE ORAL NIGHTLY
Status: DISCONTINUED | OUTPATIENT
Start: 2024-12-26 | End: 2024-12-31

## 2024-12-26 RX ORDER — ATORVASTATIN CALCIUM 40 MG/1
40 TABLET, FILM COATED ORAL EVERY EVENING
Status: DISCONTINUED | OUTPATIENT
Start: 2024-12-26 | End: 2025-01-01 | Stop reason: HOSPADM

## 2024-12-26 RX ORDER — ONDANSETRON 2 MG/ML
INJECTION INTRAMUSCULAR; INTRAVENOUS
Status: DISCONTINUED | OUTPATIENT
Start: 2024-12-26 | End: 2024-12-26 | Stop reason: SDUPTHER

## 2024-12-26 RX ORDER — LIDOCAINE HYDROCHLORIDE ANHYDROUS AND DEXTROSE MONOHYDRATE 5; 400 G/100ML; MG/100ML
1 INJECTION, SOLUTION INTRAVENOUS CONTINUOUS
Status: DISCONTINUED | OUTPATIENT
Start: 2024-12-26 | End: 2024-12-27

## 2024-12-26 RX ORDER — FENTANYL CITRATE 50 UG/ML
100 INJECTION, SOLUTION INTRAMUSCULAR; INTRAVENOUS
Status: DISCONTINUED | OUTPATIENT
Start: 2024-12-26 | End: 2024-12-26 | Stop reason: HOSPADM

## 2024-12-26 RX ORDER — PROMETHAZINE HYDROCHLORIDE 12.5 MG/1
12.5 TABLET ORAL EVERY 6 HOURS PRN
Status: DISCONTINUED | OUTPATIENT
Start: 2024-12-26 | End: 2025-01-01 | Stop reason: HOSPADM

## 2024-12-26 RX ORDER — VANCOMYCIN HYDROCHLORIDE 1 G/20ML
INJECTION, POWDER, LYOPHILIZED, FOR SOLUTION INTRAVENOUS PRN
Status: DISCONTINUED | OUTPATIENT
Start: 2024-12-26 | End: 2024-12-26 | Stop reason: HOSPADM

## 2024-12-26 RX ORDER — AMLODIPINE BESYLATE 5 MG/1
5 TABLET ORAL DAILY
Status: DISCONTINUED | OUTPATIENT
Start: 2024-12-27 | End: 2025-01-01 | Stop reason: HOSPADM

## 2024-12-26 RX ORDER — LISINOPRIL 20 MG/1
40 TABLET ORAL DAILY
Status: DISCONTINUED | OUTPATIENT
Start: 2024-12-26 | End: 2025-01-01 | Stop reason: HOSPADM

## 2024-12-26 RX ORDER — SODIUM CHLORIDE 9 MG/ML
INJECTION, SOLUTION INTRAVENOUS CONTINUOUS
Status: ACTIVE | OUTPATIENT
Start: 2024-12-26 | End: 2024-12-27

## 2024-12-26 RX ORDER — BISACODYL 5 MG/1
5 TABLET, DELAYED RELEASE ORAL DAILY
Status: DISCONTINUED | OUTPATIENT
Start: 2024-12-27 | End: 2025-01-01 | Stop reason: HOSPADM

## 2024-12-26 RX ORDER — ALBUTEROL SULFATE 0.83 MG/ML
2.5 SOLUTION RESPIRATORY (INHALATION) EVERY 6 HOURS PRN
Status: DISCONTINUED | OUTPATIENT
Start: 2024-12-26 | End: 2025-01-01 | Stop reason: HOSPADM

## 2024-12-26 RX ORDER — HYDROCODONE BITARTRATE AND ACETAMINOPHEN 10; 325 MG/1; MG/1
1 TABLET ORAL EVERY 6 HOURS
Status: DISCONTINUED | OUTPATIENT
Start: 2024-12-26 | End: 2024-12-28

## 2024-12-26 RX ORDER — DIPHENHYDRAMINE HYDROCHLORIDE 50 MG/ML
25 INJECTION INTRAMUSCULAR; INTRAVENOUS EVERY 6 HOURS PRN
Status: DISCONTINUED | OUTPATIENT
Start: 2024-12-26 | End: 2025-01-01 | Stop reason: HOSPADM

## 2024-12-26 RX ORDER — SODIUM CHLORIDE 0.9 % (FLUSH) 0.9 %
5-40 SYRINGE (ML) INJECTION EVERY 12 HOURS SCHEDULED
Status: DISCONTINUED | OUTPATIENT
Start: 2024-12-26 | End: 2025-01-01 | Stop reason: HOSPADM

## 2024-12-26 RX ORDER — SODIUM CHLORIDE 0.9 % (FLUSH) 0.9 %
5-40 SYRINGE (ML) INJECTION EVERY 12 HOURS SCHEDULED
Status: DISCONTINUED | OUTPATIENT
Start: 2024-12-26 | End: 2024-12-26 | Stop reason: HOSPADM

## 2024-12-26 RX ORDER — OXYCODONE HYDROCHLORIDE 5 MG/1
5 TABLET ORAL
Status: DISCONTINUED | OUTPATIENT
Start: 2024-12-26 | End: 2024-12-26 | Stop reason: HOSPADM

## 2024-12-26 RX ORDER — POLYETHYLENE GLYCOL 3350 17 G/17G
17 POWDER, FOR SOLUTION ORAL DAILY
Status: DISCONTINUED | OUTPATIENT
Start: 2024-12-27 | End: 2025-01-01 | Stop reason: HOSPADM

## 2024-12-26 RX ORDER — SODIUM CHLORIDE 9 MG/ML
INJECTION, SOLUTION INTRAVENOUS PRN
Status: DISCONTINUED | OUTPATIENT
Start: 2024-12-26 | End: 2025-01-01 | Stop reason: HOSPADM

## 2024-12-26 RX ORDER — MIDAZOLAM HYDROCHLORIDE 2 MG/2ML
2 INJECTION, SOLUTION INTRAMUSCULAR; INTRAVENOUS
Status: DISCONTINUED | OUTPATIENT
Start: 2024-12-26 | End: 2024-12-26 | Stop reason: HOSPADM

## 2024-12-26 RX ORDER — LIDOCAINE HYDROCHLORIDE 20 MG/ML
INJECTION, SOLUTION EPIDURAL; INFILTRATION; INTRACAUDAL; PERINEURAL
Status: DISCONTINUED | OUTPATIENT
Start: 2024-12-26 | End: 2024-12-26 | Stop reason: SDUPTHER

## 2024-12-26 RX ORDER — NALOXONE HYDROCHLORIDE 0.4 MG/ML
INJECTION, SOLUTION INTRAMUSCULAR; INTRAVENOUS; SUBCUTANEOUS PRN
Status: DISCONTINUED | OUTPATIENT
Start: 2024-12-26 | End: 2024-12-26 | Stop reason: HOSPADM

## 2024-12-26 RX ORDER — METHOCARBAMOL 100 MG/ML
1000 INJECTION, SOLUTION INTRAMUSCULAR; INTRAVENOUS EVERY 8 HOURS
Status: DISCONTINUED | OUTPATIENT
Start: 2024-12-26 | End: 2024-12-31

## 2024-12-26 RX ORDER — SCOLOPAMINE TRANSDERMAL SYSTEM 1 MG/1
1 PATCH, EXTENDED RELEASE TRANSDERMAL
Status: COMPLETED | OUTPATIENT
Start: 2024-12-26 | End: 2024-12-29

## 2024-12-26 RX ORDER — HYDROMORPHONE HYDROCHLORIDE 2 MG/ML
INJECTION, SOLUTION INTRAMUSCULAR; INTRAVENOUS; SUBCUTANEOUS
Status: DISCONTINUED | OUTPATIENT
Start: 2024-12-26 | End: 2024-12-26 | Stop reason: SDUPTHER

## 2024-12-26 RX ORDER — FENTANYL CITRATE 50 UG/ML
25 INJECTION, SOLUTION INTRAMUSCULAR; INTRAVENOUS EVERY 5 MIN PRN
Status: DISCONTINUED | OUTPATIENT
Start: 2024-12-26 | End: 2024-12-26 | Stop reason: HOSPADM

## 2024-12-26 RX ORDER — EPHEDRINE SULFATE 5 MG/ML
INJECTION INTRAVENOUS
Status: DISCONTINUED | OUTPATIENT
Start: 2024-12-26 | End: 2024-12-26 | Stop reason: SDUPTHER

## 2024-12-26 RX ORDER — SODIUM CHLORIDE 0.9 % (FLUSH) 0.9 %
SYRINGE (ML) INJECTION
Status: DISCONTINUED | OUTPATIENT
Start: 2024-12-26 | End: 2024-12-26 | Stop reason: SDUPTHER

## 2024-12-26 RX ORDER — PROPOFOL 10 MG/ML
INJECTION, EMULSION INTRAVENOUS
Status: DISCONTINUED | OUTPATIENT
Start: 2024-12-26 | End: 2024-12-26 | Stop reason: SDUPTHER

## 2024-12-26 RX ORDER — BUPIVACAINE HYDROCHLORIDE AND EPINEPHRINE 2.5; 5 MG/ML; UG/ML
INJECTION, SOLUTION EPIDURAL; INFILTRATION; INTRACAUDAL; PERINEURAL PRN
Status: DISCONTINUED | OUTPATIENT
Start: 2024-12-26 | End: 2024-12-26 | Stop reason: HOSPADM

## 2024-12-26 RX ORDER — ONDANSETRON 2 MG/ML
4 INJECTION INTRAMUSCULAR; INTRAVENOUS EVERY 6 HOURS PRN
Status: DISCONTINUED | OUTPATIENT
Start: 2024-12-26 | End: 2025-01-01 | Stop reason: HOSPADM

## 2024-12-26 RX ORDER — DIPHENHYDRAMINE HYDROCHLORIDE 50 MG/ML
12.5 INJECTION INTRAMUSCULAR; INTRAVENOUS
Status: DISCONTINUED | OUTPATIENT
Start: 2024-12-26 | End: 2024-12-26 | Stop reason: HOSPADM

## 2024-12-26 RX ORDER — ACETAMINOPHEN 325 MG/1
650 TABLET ORAL EVERY 6 HOURS
Status: DISCONTINUED | OUTPATIENT
Start: 2024-12-26 | End: 2025-01-01 | Stop reason: HOSPADM

## 2024-12-26 RX ORDER — METOCLOPRAMIDE HYDROCHLORIDE 5 MG/ML
10 INJECTION INTRAMUSCULAR; INTRAVENOUS
Status: DISCONTINUED | OUTPATIENT
Start: 2024-12-26 | End: 2024-12-26 | Stop reason: HOSPADM

## 2024-12-26 RX ORDER — LIDOCAINE HYDROCHLORIDE ANHYDROUS AND DEXTROSE MONOHYDRATE 5; 400 G/100ML; MG/100ML
INJECTION, SOLUTION INTRAVENOUS
Status: DISCONTINUED | OUTPATIENT
Start: 2024-12-26 | End: 2024-12-26 | Stop reason: SDUPTHER

## 2024-12-26 RX ORDER — DEXAMETHASONE SODIUM PHOSPHATE 10 MG/ML
INJECTION INTRAMUSCULAR; INTRAVENOUS
Status: DISCONTINUED | OUTPATIENT
Start: 2024-12-26 | End: 2024-12-26 | Stop reason: SDUPTHER

## 2024-12-26 RX ORDER — DIPHENHYDRAMINE HCL 25 MG
25 CAPSULE ORAL EVERY 6 HOURS PRN
Status: DISCONTINUED | OUTPATIENT
Start: 2024-12-26 | End: 2025-01-01 | Stop reason: HOSPADM

## 2024-12-26 RX ORDER — ROCURONIUM BROMIDE 10 MG/ML
INJECTION, SOLUTION INTRAVENOUS
Status: DISCONTINUED | OUTPATIENT
Start: 2024-12-26 | End: 2024-12-26 | Stop reason: SDUPTHER

## 2024-12-26 RX ORDER — ONDANSETRON 2 MG/ML
4 INJECTION INTRAMUSCULAR; INTRAVENOUS
Status: DISCONTINUED | OUTPATIENT
Start: 2024-12-26 | End: 2024-12-26 | Stop reason: HOSPADM

## 2024-12-26 RX ADMIN — Medication 3 AMPULE: at 08:37

## 2024-12-26 RX ADMIN — SODIUM CHLORIDE, PRESERVATIVE FREE 20 ML: 5 INJECTION INTRAVENOUS at 11:36

## 2024-12-26 RX ADMIN — ONDANSETRON 4 MG: 2 INJECTION INTRAMUSCULAR; INTRAVENOUS at 12:30

## 2024-12-26 RX ADMIN — PROPOFOL 50 MCG/KG/MIN: 10 INJECTION, EMULSION INTRAVENOUS at 11:04

## 2024-12-26 RX ADMIN — METHOCARBAMOL 1000 MG: 100 INJECTION INTRAMUSCULAR; INTRAVENOUS at 15:08

## 2024-12-26 RX ADMIN — PROPOFOL 150 MG: 10 INJECTION, EMULSION INTRAVENOUS at 10:36

## 2024-12-26 RX ADMIN — DEXAMETHASONE SODIUM PHOSPHATE 10 MG: 10 INJECTION INTRAMUSCULAR; INTRAVENOUS at 10:49

## 2024-12-26 RX ADMIN — SUGAMMADEX 200 MG: 100 INJECTION, SOLUTION INTRAVENOUS at 11:23

## 2024-12-26 RX ADMIN — WATER 2000 MG: 1 INJECTION INTRAMUSCULAR; INTRAVENOUS; SUBCUTANEOUS at 18:02

## 2024-12-26 RX ADMIN — PHENYLEPHRINE HYDROCHLORIDE 100 MCG: 10 INJECTION INTRAVENOUS at 10:57

## 2024-12-26 RX ADMIN — LIDOCAINE HYDROCHLORIDE 100 MG: 20 INJECTION, SOLUTION EPIDURAL; INFILTRATION; INTRACAUDAL; PERINEURAL at 10:36

## 2024-12-26 RX ADMIN — SODIUM CHLORIDE, POTASSIUM CHLORIDE, SODIUM LACTATE AND CALCIUM CHLORIDE: 600; 310; 30; 20 INJECTION, SOLUTION INTRAVENOUS at 14:01

## 2024-12-26 RX ADMIN — SODIUM CHLORIDE, PRESERVATIVE FREE 10 ML: 5 INJECTION INTRAVENOUS at 12:31

## 2024-12-26 RX ADMIN — CEFAZOLIN 2000 MG: 2 INJECTION, POWDER, FOR SOLUTION INTRAMUSCULAR; INTRAVENOUS at 10:45

## 2024-12-26 RX ADMIN — HYDROCODONE BITARTRATE AND ACETAMINOPHEN 1 TABLET: 10; 325 TABLET ORAL at 21:25

## 2024-12-26 RX ADMIN — SODIUM CHLORIDE, PRESERVATIVE FREE 30 ML: 5 INJECTION INTRAVENOUS at 10:58

## 2024-12-26 RX ADMIN — ACETAMINOPHEN 650 MG: 500 TABLET, FILM COATED ORAL at 21:25

## 2024-12-26 RX ADMIN — EPHEDRINE SULFATE 5 MG: 5 INJECTION INTRAVENOUS at 12:59

## 2024-12-26 RX ADMIN — ATORVASTATIN CALCIUM 40 MG: 40 TABLET, FILM COATED ORAL at 18:02

## 2024-12-26 RX ADMIN — PROPOFOL 50 MG: 10 INJECTION, EMULSION INTRAVENOUS at 10:48

## 2024-12-26 RX ADMIN — ROCURONIUM BROMIDE 50 MG: 10 INJECTION, SOLUTION INTRAVENOUS at 10:37

## 2024-12-26 RX ADMIN — LISINOPRIL 40 MG: 20 TABLET ORAL at 18:02

## 2024-12-26 RX ADMIN — HYDROMORPHONE HYDROCHLORIDE 1 MG: 2 INJECTION INTRAMUSCULAR; INTRAVENOUS; SUBCUTANEOUS at 10:37

## 2024-12-26 RX ADMIN — PHENYLEPHRINE HYDROCHLORIDE 100 MCG: 10 INJECTION INTRAVENOUS at 11:10

## 2024-12-26 RX ADMIN — PROPOFOL 40 MG: 10 INJECTION, EMULSION INTRAVENOUS at 13:20

## 2024-12-26 RX ADMIN — GABAPENTIN 300 MG: 300 CAPSULE ORAL at 21:26

## 2024-12-26 RX ADMIN — EPHEDRINE SULFATE 10 MG: 5 INJECTION INTRAVENOUS at 12:12

## 2024-12-26 RX ADMIN — LIDOCAINE HYDROCHLORIDE 1.5 MG/KG/HR: 4 INJECTION, SOLUTION INTRAVENOUS at 11:04

## 2024-12-26 RX ADMIN — FLUOXETINE HYDROCHLORIDE 40 MG: 20 CAPSULE ORAL at 18:02

## 2024-12-26 RX ADMIN — ACETAMINOPHEN 650 MG: 500 TABLET, FILM COATED ORAL at 16:37

## 2024-12-26 RX ADMIN — HYDROMORPHONE HYDROCHLORIDE 0.5 MG: 1 INJECTION, SOLUTION INTRAMUSCULAR; INTRAVENOUS; SUBCUTANEOUS at 14:25

## 2024-12-26 RX ADMIN — HYDROMORPHONE HYDROCHLORIDE 0.5 MG: 1 INJECTION, SOLUTION INTRAMUSCULAR; INTRAVENOUS; SUBCUTANEOUS at 13:54

## 2024-12-26 RX ADMIN — EPHEDRINE SULFATE 10 MG: 5 INJECTION INTRAVENOUS at 10:55

## 2024-12-26 ASSESSMENT — PAIN DESCRIPTION - LOCATION
LOCATION: BACK
LOCATION: BACK

## 2024-12-26 ASSESSMENT — PAIN - FUNCTIONAL ASSESSMENT: PAIN_FUNCTIONAL_ASSESSMENT: NONE - DENIES PAIN

## 2024-12-26 ASSESSMENT — PAIN SCALES - GENERAL
PAINLEVEL_OUTOF10: 10
PAINLEVEL_OUTOF10: 10

## 2024-12-26 NOTE — H&P
Diagnosis Date    Allergic rhinitis      Anxiety and depression      Cervical radiculopathy      COPD (chronic obstructive pulmonary disease) ([facility])       no home O2    High cholesterol      History of bone density study 2023     osteopenia    History of mammogram 2023    History of Papanicolaou smear of cervix 2023    History of seizures       poss seizure - about     Hypertension      Osteopenia                    Surgical History     Past Surgical History         Past Surgical History:   Procedure Laterality Date    BUNIONECTOMY Right       SECTION         x3    COLONOSCOPY   2016     s/p polypectomy    DILATION AND CURETTAGE OF UTERUS N/A 2023     HYSTEROSCOPY DILATATION CURETTAGE WITH MYOSURE performed by Jacinta Shrestha MD [facility]    WRIST SURGERY Left                    Current Medications     Current Facility-Administered Medications          Current Outpatient Medications   Medication Sig Dispense Refill    Misc. Devices MISC Cane, use daily for ambulation 1 each 0    lisinopril (PRINIVIL;ZESTRIL) 40 MG tablet Take 1 tablet by mouth daily 90 tablet 1    carvedilol (COREG) 12.5 MG tablet Take 1 tablet by mouth 2 times daily 180 tablet 1    gabapentin (NEURONTIN) 300 MG capsule Take 1 capsule by mouth nightly for 180 days. Intended supply: 30 days 90 capsule 1    amLODIPine (NORVASC) 5 MG tablet Take 1 tablet by mouth daily 90 tablet 0    meloxicam (MOBIC) 7.5 MG tablet Take 1 tablet by mouth daily 10 tablet 0    progesterone (PROMETRIUM) 200 MG CAPS capsule 1 po qhs 90 capsule 3    doxycycline hyclate (VIBRA-TABS) 100 MG tablet 100 mg po every 12 hours. 14 tablet 0    fexofenadine (ALLEGRA) 180 MG tablet Take 1 tablet by mouth daily 90 tablet 1    fluticasone-salmeterol (ADVAIR DISKUS) 250-50 MCG/ACT AEPB diskus inhaler Inhale 1 puff twice a day by inhalation route for 30 days. 1 each 5    FLUoxetine (PROZAC) 40 MG capsule Take 1 capsule by mouth every evening 90 capsule 1    Had a long conversation with patient.  I think patient's pain is pretty severe and she can barely stand and ambulate.  I think patient will be a good surgical candidate as the degree of stenosis and the degree of pain is so severe.  I recommend an L2-L5 laminectomy for decompression, and an L2-3 discectomy versus resection of nerve sheath tumor.     Had a long conversation with patient about her smoking.  I advised patient that smoking could inhibit wound healing and I recommend patient quit smoking.  We discussed surgery in detail as well as went over the risk and benefit of surgery.  We also discussed patient's degenerative changes and her risk for developing worsening degenerative disc disease.  At this point I think addressing patient's current problem will help relieve her pain.     -Lumbar Laminectomy: We discussed the details of the surgery including a midline incision in over the low back followed by dissection to the area of stenosis.  The nerves would be freed up by trimming any impinging structures including ligaments and bone.  We would also explore the left L2 nerve roots to make sure the impingement is either a herniated disc or is a nerve sheath tumor.  If it can be safely resected we will otherwise we will decompress.  Once the nerves are freed the wound would be closed with suture and covered with sterile dressings.  Patient would expect to stay in the hospital 1-2 days until she can get about safely with minimal assistance.  Follow-up would be scheduled for 2-3 weeks and she would have restrictions including no driving, and no lifting greater than 15 lbs until follow up with me.  We also discussed the potential risks of the surgery including, but not limited to infection, spinal fluid leak and potential headaches requiring her to remain supine post-operatively; injury to the cauda equina or peripheral nerve root resulting in weakness, numbness, or very rarely bowel or bladder dysfunction;

## 2024-12-26 NOTE — OP NOTE
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]     * Spondylosis [M47.9]    Post-Op Diagnosis: Same       Procedure(s):  L2-L5 laminectomy for decompression, and an L2-3 discectomy    Laminectomy L2-L5  (CPT 12206, 63048 x 2)    Surgeon(s):  Fito Maddox MD    Assistant:   * No surgical staff found *    Anesthesia: General    Estimated Blood Loss (mL): less than 50     Complications: None    Specimens:   * No specimens in log *    Implants:  * No implants in log *      Drains: * No LDAs found *    Findings:  Infection Present At Time Of Surgery (PATOS) (choose all levels that have infection present):  No infection present  Other Findings: herniated disc    Detailed Description of Procedure:     The patient was brought to the operating room and general anesthesia was induced.  All lines and EKG leads were placed by the anesthetic service.  The patient was positioned prone on the August table all pressure points adequately padded.  Fluoroscopy was brought in and a localization x-ray was obtained.  Incision was prepped and draped in the usual sterile fashion.  Incision was made with a #10 blade in the midline.  Using the Bovie cautery incision was taken down to the spinous process from L2-L5.  The fascia was then opened and a bilateral subperiosteal dissection was carried out and fluoroscopy was used to confirm the location to be at L2-L5.      And a Leksell rongeur was used to take down the spinous process from L2-L5.  A high-speed hunter was used to perform the laminotomy from L2-L5.   We completed the bony work and subsequently released the ligamentum flavum circumferentially from L2-L5.  We then removed it in one large pieces.  We finished

## 2024-12-26 NOTE — PROGRESS NOTES
Referral made to [facility] for early discharge, note made infant in NICU   TRANSFER - IN REPORT:    Verbal report received from LUCIEN Song on Bonny Jensen  being received from PACU for routine progression of patient care      Report consisted of patient's Situation, Background, Assessment and   Recommendations(SBAR).     Information from the following report(s) Nurse Handoff Report was reviewed with the receiving nurse.    Opportunity for questions and clarification was provided.      Assessment completed upon patient's arrival to unit and care assumed.

## 2024-12-26 NOTE — ANESTHESIA POSTPROCEDURE EVALUATION
Department of Anesthesiology  Postprocedure Note    Patient: Bonny Jensen  MRN: [id number]  YOB: 1955  Date of evaluation: 12/26/2024    Procedure Summary       Date: 12/26/24 Room / Location: [facility] MAIN OR [address] [place] [address] MAIN OR    Anesthesia Start: 1024 Anesthesia Stop: 1342    Procedure: L2-L5 laminectomy for decompression, and an L2-3 discectomy Diagnosis:       Herniated lumbar intervertebral disc      Neurogenic claudication due to lumbar spinal stenosis      Benign nerve sheath neoplasm      Spondylosis      (Herniated lumbar intervertebral disc [M51.26])      (Neurogenic claudication due to lumbar spinal stenosis [M48.062])      (Benign nerve sheath neoplasm [D36.10])      (Spondylosis [M47.9])    Providers: Fito Maddox MD Responsible Provider: Victorino Klein MD    Anesthesia Type: General ASA Status: 3            Anesthesia Type: General    Jonas Phase I: Jonas Score: 8    Jonas Phase II:      Anesthesia Post Evaluation    Patient location during evaluation: PACU  Patient participation: complete - patient participated  Level of consciousness: awake and awake and alert  Airway patency: patent  Nausea & Vomiting: no nausea  Cardiovascular status: hemodynamically stable  Respiratory status: acceptable  Hydration status: euvolemic  Multimodal analgesia pain management approach  Pain management: adequate    No notable events documented.

## 2024-12-26 NOTE — PROGRESS NOTES
4 Eyes Skin Assessment     NAME:  Bonny Jensen  YOB: 1955  MEDICAL RECORD NUMBER:  [id number]    The patient is being assessed for  Admission    I agree that at least one RN has performed a thorough Head to Toe Skin Assessment on the patient. ALL assessment sites listed below have been assessed.      Areas assessed by both nurses:    Head, Face, Ears, Shoulders, Back, Chest, Arms, Elbows, Hands, Sacrum. Buttock, Coccyx, Ischium, Legs. Feet and Heels, and Under Medical Devices         Does the Patient have a Wound? No noted wound(s)       Lacho Prevention initiated by RN: Yes  Wound Care Orders initiated by RN: Yes    Pressure Injury (Stage 3,4, Unstageable, DTI, NWPT, and Complex wounds) if present, place Wound referral order by RN under : No    New Ostomies, if present place, Ostomy referral order under : No     Nurse 1 eSignature: Electronically signed by PARIS OH RN on 12/26/24 at 6:23 PM EST    **SHARE this note so that the co-signing nurse can place an eSignature**    Nurse 2 eSignature: {Esignature:[id number]}

## 2024-12-26 NOTE — PERIOP NOTE
TRANSFER - OUT REPORT:    Verbal report given to LUCIEN Nguyen on Bonny Jensen  being transferred to [facility] for routine progression of patient care       Report consisted of patient’s Situation, Background, Assessment and   Recommendations(SBAR).     Information from the following report(s) Nurse Handoff Report, Adult Overview, Surgery Report, and Neuro Assessment was reviewed with the receiving nurse.    Lines:   Peripheral IV 12/26/24 Posterior;Right Hand (Active)   Site Assessment Clean, dry & intact 12/26/24 1419   Line Status Capped 12/26/24 1419   Line Care Connections checked and tightened 12/26/24 1419   Phlebitis Assessment No symptoms 12/26/24 1419   Infiltration Assessment 0 12/26/24 1419   Alcohol Cap Used Yes 12/26/24 1419   Dressing Status Clean, dry & intact 12/26/24 1419   Dressing Type Transparent 12/26/24 1419        Opportunity for questions and clarification was provided.      Patient to be transported with:   O2 @ 4 liters  Tech  Lidocaine infusing at 13.1mL/Hr

## 2024-12-26 NOTE — PROGRESS NOTES
[facility] End of Shift-Days For Spine Surgery Patient    Day of Surgery   Ideal body weight: 52.4 kg (115 lb 8.3 oz)    OOB POD#0 (within 6 hours of end anesthesia-if not contraindicated)  Pt just arrived to floor    ADULT DIET; Regular    Tolerating Diet?: Yes    Ensure Surgery Immunonutrition Shakes - 2 per day (Starting POD 0)?  Yes    Ambulated in humphrey Pt just arrived to floor; too lethargic at this time times.     Up to chair for Pt just arrived to floor meals.    Lidocaine: Pt just arrived to floor Discontinued POD#1 approx 0600 Pt just arrived to floor    PRN Pain Medications Used?: No; too lethargic for pain medication at this time    IS Used 10x/hr while awake: Yes     DRAINS? No    Nixon? No    BM?  No   Passing flatus? Yes    TEDs Yes    SCDs Yes         Signed By: PARIS OH RN     December 26, 2024

## 2024-12-26 NOTE — ANESTHESIA PRE PROCEDURE
solid food consumption: 12/25/24    BMI:   Wt Readings from Last 3 Encounters:   12/26/24 73.9 kg (163 lb)   12/10/24 73 kg (161 lb)   12/05/24 73.7 kg (162 lb 8 oz)     Body mass index is 28.87 kg/m².    CBC:   Lab Results   Component Value Date/Time    WBC 8.0 10/15/2024 12:23 PM    RBC 4.07 10/15/2024 12:23 PM    HGB 11.8 12/05/2024 02:27 PM    HCT 39.0 10/15/2024 12:23 PM    MCV 95.8 10/15/2024 12:23 PM    RDW 14.0 10/15/2024 12:23 PM     10/15/2024 12:23 PM       CMP:   Lab Results   Component Value Date/Time     10/15/2024 12:23 PM    K 4.3 10/15/2024 12:23 PM     10/15/2024 12:23 PM    CO2 26 10/15/2024 12:23 PM    BUN 11 10/15/2024 12:23 PM    CREATININE 0.77 10/15/2024 12:23 PM    GFRAA >60 04/13/2022 06:40 PM    AGRATIO 1.0 04/13/2022 06:40 PM    LABGLOM 84 10/15/2024 12:23 PM    LABGLOM >60 01/26/2024 11:15 AM    GLUCOSE 198 10/15/2024 12:23 PM    CALCIUM 8.9 10/15/2024 12:23 PM    BILITOT 0.2 10/15/2024 12:23 PM    ALKPHOS 119 10/15/2024 12:23 PM    ALKPHOS 108 04/13/2022 06:40 PM    AST 22 10/15/2024 12:23 PM    ALT 19 10/15/2024 12:23 PM       POC Tests: No results for input(s): \"POCGLU\", \"POCNA\", \"POCK\", \"POCCL\", \"POCBUN\", \"POCHEMO\", \"POCHCT\" in the last 72 hours.    Coags: No results found for: \"PROTIME\", \"INR\", \"APTT\"    HCG (If Applicable): No results found for: \"PREGTESTUR\", \"PREGSERUM\", \"HCG\", \"HCGQUANT\"     ABGs: No results found for: \"PHART\", \"PO2ART\", \"VVE8BBP\", \"YTV5WQO\", \"BEART\", \"J7SVUIHZ\"     Type & Screen (If Applicable):  No results found for: \"LABABO\"    Drug/Infectious Status (If Applicable):  No results found for: \"HIV\", \"HEPCAB\"    COVID-19 Screening (If Applicable): No results found for: \"COVID19\"        Anesthesia Evaluation  Patient summary reviewed  Airway: Mallampati: II  TM distance: >3 FB   Neck ROM: full  Mouth opening: > = 3 FB   Dental:          Pulmonary:normal exam    (+)  COPD:                                     Cardiovascular:  Exercise tolerance:

## 2024-12-26 NOTE — PROGRESS NOTES
This RN attempted to complete admission database, however patient is too lethargic and disoriented at this time to answer questions appropriately and family is uncomfortable in assisting. Will re-attempt as orientation improves.

## 2024-12-26 NOTE — ANESTHESIA PROCEDURE NOTES
Airway  Date/Time: 12/26/2024 10:38 AM  Urgency: elective    Airway not difficult    General Information and Staff    Patient location during procedure: OR  Resident/CRNA: Mana Marie APRN - CRNA  Performed: resident/CRNA   Performed by: Mana Marie APRN - CRNA  Authorized by: Victorino Klein MD      Indications and Patient Condition  Indications for airway management: anesthesia  Spontaneous Ventilation: absent  Sedation level: deep  Preoxygenated: yes  Patient position: sniffing  MILS not maintained throughout  Mask difficulty assessment: vent by bag mask    Final Airway Details  Final airway type: endotracheal airway      Successful airway: ETT  Cuffed: yes   Successful intubation technique: direct laryngoscopy  Facilitating devices/methods: intubating stylet  Endotracheal tube insertion site: oral  Blade: Praveen  Blade size: #3  ETT size (mm): 7.0  Cormack-Lehane Classification: grade I - full view of glottis  Placement verified by: chest auscultation and capnometry   Measured from: lips  ETT to lips (cm): 21  Number of attempts at approach: 1  Ventilation between attempts: bag mask

## 2024-12-26 NOTE — PERIOP NOTE
Lidocaine drip pump settings confirmed at Ideal body weight: 52.4 kg (115 lb 8.3 oz) .  Infusing at 13.1 ml/hour.

## 2024-12-27 PROCEDURE — G0378 HOSPITAL OBSERVATION PER HR: HCPCS

## 2024-12-27 PROCEDURE — 96374 THER/PROPH/DIAG INJ IV PUSH: CPT

## 2024-12-27 PROCEDURE — 96376 TX/PRO/DX INJ SAME DRUG ADON: CPT

## 2024-12-27 PROCEDURE — 97165 OT EVAL LOW COMPLEX 30 MIN: CPT

## 2024-12-27 PROCEDURE — 99024 POSTOP FOLLOW-UP VISIT: CPT | Performed by: STUDENT IN AN ORGANIZED HEALTH CARE EDUCATION/TRAINING PROGRAM

## 2024-12-27 PROCEDURE — 97530 THERAPEUTIC ACTIVITIES: CPT

## 2024-12-27 PROCEDURE — 97161 PT EVAL LOW COMPLEX 20 MIN: CPT

## 2024-12-27 PROCEDURE — 6360000002 HC RX W HCPCS: Performed by: STUDENT IN AN ORGANIZED HEALTH CARE EDUCATION/TRAINING PROGRAM

## 2024-12-27 PROCEDURE — 6370000000 HC RX 637 (ALT 250 FOR IP): Performed by: STUDENT IN AN ORGANIZED HEALTH CARE EDUCATION/TRAINING PROGRAM

## 2024-12-27 PROCEDURE — 97112 NEUROMUSCULAR REEDUCATION: CPT

## 2024-12-27 PROCEDURE — 2500000003 HC RX 250 WO HCPCS: Performed by: STUDENT IN AN ORGANIZED HEALTH CARE EDUCATION/TRAINING PROGRAM

## 2024-12-27 PROCEDURE — 97535 SELF CARE MNGMENT TRAINING: CPT

## 2024-12-27 PROCEDURE — 96375 TX/PRO/DX INJ NEW DRUG ADDON: CPT

## 2024-12-27 RX ADMIN — ACETAMINOPHEN 650 MG: 500 TABLET, FILM COATED ORAL at 08:40

## 2024-12-27 RX ADMIN — HYDROCODONE BITARTRATE AND ACETAMINOPHEN 1 TABLET: 10; 325 TABLET ORAL at 20:33

## 2024-12-27 RX ADMIN — METHOCARBAMOL 1000 MG: 100 INJECTION INTRAMUSCULAR; INTRAVENOUS at 23:05

## 2024-12-27 RX ADMIN — GABAPENTIN 300 MG: 300 CAPSULE ORAL at 20:33

## 2024-12-27 RX ADMIN — BISACODYL 5 MG: 5 TABLET, COATED ORAL at 08:40

## 2024-12-27 RX ADMIN — HYDROCODONE BITARTRATE AND ACETAMINOPHEN 1 TABLET: 10; 325 TABLET ORAL at 13:39

## 2024-12-27 RX ADMIN — METHOCARBAMOL 1000 MG: 100 INJECTION INTRAMUSCULAR; INTRAVENOUS at 06:38

## 2024-12-27 RX ADMIN — SODIUM CHLORIDE, PRESERVATIVE FREE 5 ML: 5 INJECTION INTRAVENOUS at 08:41

## 2024-12-27 RX ADMIN — HYDROCODONE BITARTRATE AND ACETAMINOPHEN 1 TABLET: 10; 325 TABLET ORAL at 03:18

## 2024-12-27 RX ADMIN — LISINOPRIL 40 MG: 20 TABLET ORAL at 08:41

## 2024-12-27 RX ADMIN — HYDROCODONE BITARTRATE AND ACETAMINOPHEN 1 TABLET: 10; 325 TABLET ORAL at 08:41

## 2024-12-27 RX ADMIN — HYDROMORPHONE HYDROCHLORIDE 0.5 MG: 1 INJECTION, SOLUTION INTRAMUSCULAR; INTRAVENOUS; SUBCUTANEOUS at 10:39

## 2024-12-27 RX ADMIN — METHOCARBAMOL 1000 MG: 100 INJECTION INTRAMUSCULAR; INTRAVENOUS at 13:38

## 2024-12-27 RX ADMIN — ATORVASTATIN CALCIUM 40 MG: 40 TABLET, FILM COATED ORAL at 18:20

## 2024-12-27 RX ADMIN — AMLODIPINE BESYLATE 5 MG: 5 TABLET ORAL at 08:41

## 2024-12-27 RX ADMIN — METHOCARBAMOL 1000 MG: 100 INJECTION INTRAMUSCULAR; INTRAVENOUS at 00:02

## 2024-12-27 RX ADMIN — WATER 2000 MG: 1 INJECTION INTRAMUSCULAR; INTRAVENOUS; SUBCUTANEOUS at 03:17

## 2024-12-27 RX ADMIN — SODIUM CHLORIDE, PRESERVATIVE FREE 5 ML: 5 INJECTION INTRAVENOUS at 20:34

## 2024-12-27 RX ADMIN — ACETAMINOPHEN 650 MG: 500 TABLET, FILM COATED ORAL at 13:39

## 2024-12-27 RX ADMIN — FLUOXETINE HYDROCHLORIDE 40 MG: 20 CAPSULE ORAL at 18:20

## 2024-12-27 ASSESSMENT — PAIN DESCRIPTION - DESCRIPTORS: DESCRIPTORS: ACHING

## 2024-12-27 ASSESSMENT — PAIN DESCRIPTION - ORIENTATION: ORIENTATION: MID;LOWER

## 2024-12-27 ASSESSMENT — PAIN - FUNCTIONAL ASSESSMENT: PAIN_FUNCTIONAL_ASSESSMENT: ACTIVITIES ARE NOT PREVENTED

## 2024-12-27 ASSESSMENT — PAIN SCALES - GENERAL
PAINLEVEL_OUTOF10: 9
PAINLEVEL_OUTOF10: 1
PAINLEVEL_OUTOF10: 0

## 2024-12-27 ASSESSMENT — PAIN DESCRIPTION - LOCATION: LOCATION: BACK

## 2024-12-27 NOTE — PROGRESS NOTES
End of Shift-Night Spine Eras Patient  1 Day Post-Op   Ideal body weight: 52.4 kg (115 lb 8.3 oz)    OOB POD#0 (within 6 hours of end anesthesia-if not contraindicated) Yes    Ambulated in humphrey 0 times. Pt stood to edge of bed; too weak to fully stand and ambulate further.    Up to chair 0 times    Lidocaine: Yes Discontinued POD#1 approx 0600 Yes    PRN Pain Medications Used?: No; has scheduled pain meds    IS Used 10x/hr while awke: Yes     DRAINS? No    Nixon? No    BM?  No   Passing flatus? Yes    TEDs Yes    SCDs Yes         Signed By: Megha Limon RN     December 27, 2024          There are no Wet Read(s) to document.

## 2024-12-27 NOTE — PLAN OF CARE
Problem: Safety - Adult  Goal: Free from fall injury  12/27/2024 0931 by Didier Sow RN  Outcome: Progressing  12/26/2024 2206 by Megha Limon RN  Outcome: Progressing  Flowsheets (Taken 12/26/2024 2013)  Free From Fall Injury: Instruct family/caregiver on patient safety     Problem: Pain  Goal: Verbalizes/displays adequate comfort level or baseline comfort level  12/27/2024 0931 by Didier Sow RN  Outcome: Progressing  12/26/2024 2206 by Megha Limon RN  Outcome: Progressing     Problem: Discharge Planning  Goal: Discharge to home or other facility with appropriate resources  12/27/2024 0931 by Didier Sow RN  Outcome: Progressing  12/26/2024 2206 by Megha Limon RN  Outcome: Progressing  Flowsheets (Taken 12/26/2024 1612 by Patrick Hope RN)  Discharge to home or other facility with appropriate resources: Identify barriers to discharge with patient and caregiver     Problem: Skin/Tissue Integrity  Goal: Absence of new skin breakdown  Description: 1.  Monitor for areas of redness and/or skin breakdown  2.  Assess vascular access sites hourly  3.  Every 4-6 hours minimum:  Change oxygen saturation probe site  4.  Every 4-6 hours:  If on nasal continuous positive airway pressure, respiratory therapy assess nares and determine need for appliance change or resting period.  12/27/2024 0931 by Didier Sow RN  Outcome: Progressing  12/26/2024 2206 by Megha Limon RN  Outcome: Progressing     Problem: ABCDS Injury Assessment  Goal: Absence of physical injury  12/27/2024 0931 by Didier Sow RN  Outcome: Progressing  12/26/2024 2206 by Megha Limon RN  Outcome: Progressing

## 2024-12-27 NOTE — PLAN OF CARE
Problem: Safety - Adult  Goal: Free from fall injury  Outcome: Progressing  Flowsheets (Taken 12/26/2024 2013)  Free From Fall Injury: Instruct family/caregiver on patient safety     Problem: Pain  Goal: Verbalizes/displays adequate comfort level or baseline comfort level  Outcome: Progressing     Problem: Discharge Planning  Goal: Discharge to home or other facility with appropriate resources  Outcome: Progressing  Flowsheets (Taken 12/26/2024 1612 by Patrick Hope RN)  Discharge to home or other facility with appropriate resources: Identify barriers to discharge with patient and caregiver     Problem: Skin/Tissue Integrity  Goal: Absence of new skin breakdown  Description: 1.  Monitor for areas of redness and/or skin breakdown  2.  Assess vascular access sites hourly  3.  Every 4-6 hours minimum:  Change oxygen saturation probe site  4.  Every 4-6 hours:  If on nasal continuous positive airway pressure, respiratory therapy assess nares and determine need for appliance change or resting period.  Outcome: Progressing     Problem: ABCDS Injury Assessment  Goal: Absence of physical injury  Outcome: Progressing

## 2024-12-27 NOTE — PROGRESS NOTES
ACUTE OCCUPATIONAL THERAPY GOALS:   (Developed with and agreed upon by patient and/or caregiver.)  1. Patient will verbalize and demonstrate understanding of spinal precautions with 100% accuracy during ADLs.   2. Patient will complete lower body bathing and dressing with MOD I and adaptive equipment as needed.   3. Patient will complete functional transfers with MOD I and adaptive equipment as needed.   4. Patient will complete toileting and toilet transfer with MOD I.   5. Patient will complete functional mobility of household distances with MOD I and adaptive equipment as needed.   6. Patient will demonstrate ability to log roll in bed with MOD I.   Timeframe: 7 visits      OCCUPATIONAL THERAPY Initial Assessment and Daily Note       OT Visit Days: 1  Acknowledge Orders  Time  OT Charge Capture  Rehab Caseload Tracker      Spinal Precautions    Bonny Jensen is a 69 y.o. female   PRIMARY DIAGNOSIS: Benign nerve sheath neoplasm  Herniated lumbar intervertebral disc [M51.26]  Neurogenic claudication due to lumbar spinal stenosis [M48.062]  Benign nerve sheath neoplasm [D36.10]  Spondylosis [M47.9]  Herniation of intervertebral disc of lumbar region [M51.26]  Procedure(s) (LRB):  L2-L5 laminectomy for decompression, and an L2-3 discectomy (N/A)  1 Day Post-Op  Reason for Referral: Generalized Muscle Weakness (M62.81)  Difficulty in walking, Not elsewhere classified (R26.2)  Other abnormalities of gait and mobility (R26.89)  Low Back Pain (M54.5)  Observation: Payor: [employer] MEDICARE / Plan: [employer] GOLD PLUS HMO / Product Type: *No Product type* /     ASSESSMENT:     REHAB RECOMMENDATIONS:   Recommendation to date pending progress:  Setting:  Short-term Rehab    Equipment:    Rolling Walker--pt has SPC, shower chair, grab bars at home     ASSESSMENT:  Ms. Jensen  is a 70 y/o female presents s/p L2-L5 lami and L2-L3 discectomy and is now on spinal precautions. At baseline pt lives alone (boyfriend next door), is  understanding;Continued education needed    TOTAL TREATMENT DURATION AND TIME:  Time In: 0933  Time Out: 1003  Minutes: 30    JOHNIE Leblanc, OT

## 2024-12-27 NOTE — PROGRESS NOTES
ACUTE PHYSICAL THERAPY GOALS:   (Developed with and agreed upon by patient and/or caregiver.)  LTG:  (1.)Ms. Jensen will move from supine to sit and sit to supine , scoot up and down, and roll side to side in bed with SUPERVISION within 7 treatment day(s) through log rolling.    (2.)Ms. Jensen will transfer from bed to chair and chair to bed with STAND BY ASSIST using the least restrictive device within 7 treatment day(s).    (3.)Ms. Jensen will ambulate with STAND BY ASSIST for 250+ feet with the least restrictive device within 7 treatment day(s).  (4.)Ms. Jensen will tolerate 23+ minutes of therapeutic activity within 7 treatment days for increased activity tolerance and overall functional mobility.    (5.)Ms Jensen will verbalize 3/3 spinal precautions within 7 treatment days independently for post op management.     PHYSICAL THERAPY: Daily Note PM   (Link to Caseload Tracking: PT Visit Days : 1  Time In/Out PT Charge Capture  Rehab Caseload Tracker  Orders    Spinal precautions    Bonny Jensen is a 69 y.o. female   PRIMARY DIAGNOSIS: Benign nerve sheath neoplasm  Herniated lumbar intervertebral disc [M51.26]  Neurogenic claudication due to lumbar spinal stenosis [M48.062]  Benign nerve sheath neoplasm [D36.10]  Spondylosis [M47.9]  Herniation of intervertebral disc of lumbar region [M51.26]  Procedure(s) (LRB):  L2-L5 laminectomy for decompression, and an L2-3 discectomy (N/A)  1 Day Post-Op  Observation: Payor: [employer] MEDICARE / Plan: [employer] GOLD PLUS HMO / Product Type: *No Product type* /     ASSESSMENT:     REHAB RECOMMENDATIONS:   Recommendation to date pending progress:  Setting:  Short-term Rehab    Equipment:    Rolling Walker     ASSESSMENT:  Ms. Jensen is supine on arrival, son present. Pt reports no pain at rest in supine, increased pain with all mobility, has had medication prior to session. Pt with mod A rolling to R side, supine to sit. Pt min A transfers, ambulation with RW into hallway, chair  Assistance, Max=Maximal Assistance, Total=Total Assistance, NT=Not Tested    BALANCE: Good Fair+ Fair Fair- Poor NT Comments   Sitting Static [x] [] [] [] [] []    Sitting Dynamic [] [x] [] [] [] []              Standing Static [] [] [x] [] [] [] With RW   Standing Dynamic [] [] [x] [x] [] [] With RW     GAIT: I Mod I S SBA CGA Min Mod Max Total  NT x2 Comments:   Level of Assistance [] [] [] [] [] [x] [] [] [] [] []    Distance 100, 50'  feet (seated break)    DME Gait Belt, Rolling Walker, and chair follow    Gait Quality Antalgic, Decreased ger , Decreased step clearance, Decreased step length, Decreased stance, and Narrow base of support    Weightbearing Status      Stairs      I=Independent, Mod I=Modified Independent, S=Supervision, SBA=Standby Assistance, CGA=Contact Guard Assistance,   Min=Minimal Assistance, Mod=Moderate Assistance, Max=Maximal Assistance, Total=Total Assistance, NT=Not Tested    PLAN:   FREQUENCY AND DURATION: BID for duration of hospital stay or until stated goals are met, whichever comes first.    TREATMENT:   TREATMENT:   Therapeutic Activity (24 Minutes): Therapeutic activity included Supine to Sit, Scooting, Transfer Training, Ambulation on level ground, Sitting balance , and Standing balance to improve functional Activity tolerance, Balance, Coordination, and Mobility.    TREATMENT GRID:  N/A    AFTER TREATMENT PRECAUTIONS: Alarm Activated, Bed/Chair Locked, Call light within reach, Chair, Needs within reach, RN notified, and Visitors at bedside    INTERDISCIPLINARY COLLABORATION:  RN/ PCT, PT/ PTA, and Rehab Attendant    EDUCATION: Education Given To: Patient  Education Provided: Transfer Training;Mobility Training  Education Method: Verbal  Barriers to Learning: None  Education Outcome: Continued education needed    TIME IN/OUT:  Time In: 1353  Time Out: 1417  Minutes: 24    Molly Frederick, PT

## 2024-12-27 NOTE — PROGRESS NOTES
ACUTE PHYSICAL THERAPY GOALS:   (Developed with and agreed upon by patient and/or caregiver.)  LTG:  (1.)Ms. Jensen will move from supine to sit and sit to supine , scoot up and down, and roll side to side in bed with SUPERVISION within 7 treatment day(s) through log rolling.    (2.)Ms. Jensen will transfer from bed to chair and chair to bed with STAND BY ASSIST using the least restrictive device within 7 treatment day(s).    (3.)Ms. Jensen will ambulate with STAND BY ASSIST for 250+ feet with the least restrictive device within 7 treatment day(s).  (4.)Ms. Jensen will tolerate 23+ minutes of therapeutic activity within 7 treatment days for increased activity tolerance and overall functional mobility.    (5.)Ms Jensen will verbalize 3/3 spinal precautions within 7 treatment days independently for post op management.   ________________________________________________________________________________________________       PHYSICAL THERAPY Initial Assessment and AM  (Link to Caseload Tracking: PT Visit Days : 1  Acknowledge Orders  Time In/Out  PT Charge Capture  Rehab Caseload Tracker    Bonny Jensen is a 69 y.o. female   PRIMARY DIAGNOSIS: Benign nerve sheath neoplasm  Herniated lumbar intervertebral disc [M51.26]  Neurogenic claudication due to lumbar spinal stenosis [M48.062]  Benign nerve sheath neoplasm [D36.10]  Spondylosis [M47.9]  Herniation of intervertebral disc of lumbar region [M51.26]  Procedure(s) (LRB):  L2-L5 laminectomy for decompression, and an L2-3 discectomy (N/A)  1 Day Post-Op  Reason for Referral: Generalized Muscle Weakness (M62.81)  Difficulty in walking, Not elsewhere classified (R26.2)  Other abnormalities of gait and mobility (R26.89)  Observation: Payor: [employer] MEDICARE / Plan: [employer] GOLD PLUS HMO / Product Type: *No Product type* /     ASSESSMENT:     REHAB RECOMMENDATIONS:   Recommendation to date pending progress:  Setting:  Short-term Rehab    Equipment:    Rolling Walker; has SPC,  Toilet: Bedside commode  Bathroom Equipment: Grab bars in shower, Shower chair  Home Equipment: Cane  Prior Level of Assist for ADLs: Independent  Active : No    OBJECTIVE:     PAIN: VITALS / O2: PRECAUTION / LINES / DRAINS:   Pre Treatment:    10/10 pain lower back/L LE, pt has had pain medication prior to session      Post Treatment: 10/10 back/legs, positioned for comfort in chair Vitals    WFL    Oxygen     RA External Catheter    RESTRICTIONS/PRECAUTIONS:  Restrictions/Precautions: Fall Risk        Spinal Precautions: No Bending, No Lifting, No Twisting        GROSS EVALUATION:  Intact Impaired (Comments):   AROM [x]     PROM [x]    Strength []  Generally weak but functional B LE   Balance []  Fair+/good sitting; fair standing with RW   Posture [] Rounded Shoulders   Sensation []  Tingling/numbness L medial thigh   Coordination [x]      Tone [x]     Edema [x]    Activity Tolerance []  Limited by pain, general fatigue    []      COGNITION/  PERCEPTION: Intact Impaired (Comments):   Orientation [x]     Vision [x]     Hearing [x]     Cognition  [x]       MOBILITY: I Mod I S SBA CGA Min Mod Max Total  NT x2 Comments:   Bed Mobility    Rolling [] [] [] [] [] [x] [] [] [] [] [x]    Supine to Sit [] [] [] [] [] [x] [x] [] [] [] [x]    Scooting [] [] [] [] [] [x] [] [] [] [] [x]    Sit to Supine [] [] [] [] [] [] [] [] [] [x] []    Transfers    Sit to Stand [] [] [] [] [] [x] [] [] [] [] [x]    Bed to Chair [] [] [] [] [] [x] [] [] [] [] [x] With RW, to BSC, BSC to chair   Stand to Sit [] [] [] [] [] [x] [] [] [] [] [x]     [] [] [] [] [] [] [] [] [] [] []    I=Independent, Mod I=Modified Independent, S=Supervision, SBA=Standby Assistance, CGA=Contact Guard Assistance,   Min=Minimal Assistance, Mod=Moderate Assistance, Max=Maximal Assistance, Total=Total Assistance, NT=Not Tested    GAIT: I Mod I S SBA CGA Min Mod Max Total  NT x2 Comments:   Level of Assistance [] [] [] [] [] [x] [] [] [] [] [x]    Distance 5

## 2024-12-27 NOTE — PROGRESS NOTES
[facility]    Assessment: Ms. Jensen is a 69 y.o. female 1 Day Post-Op from Procedure(s) (LRB):  L2-L5 laminectomy for decompression, and an L2-3 discectomy (N/A).          Plan:  -PT OT eval today.  -Pain control  -Likely home today or tomorrow morning    Subjective: Patient reports of incisional back pain.  Patient states radicular pain seem to have resolved postoperatively    Objective:    Neurologic Exam:  Afebrile  AOx3      Incision: Clean, dry and intact    Motor      Lower Extremity Right Left   Illiopsoas (L2,3) 5 5   Quadriceps (L3,4) 5 5   Dorsiflexion (L4,5) 5 5   EHL (L5,S1) 5 5   Gastrocnemius (S1,2) 5 5       Vitals:    12/27/24 0744   BP: (!) 132/53   Pulse: 68   Resp: 16   Temp: 99 °F (37.2 °C)   SpO2: 97%         Fito Maddox MD

## 2024-12-28 LAB
ANION GAP SERPL CALC-SCNC: 9 MMOL/L (ref 7–16)
BUN SERPL-MCNC: 16 MG/DL (ref 8–23)
CALCIUM SERPL-MCNC: 8.7 MG/DL (ref 8.8–10.2)
CHLORIDE SERPL-SCNC: 105 MMOL/L (ref 98–107)
CO2 SERPL-SCNC: 27 MMOL/L (ref 20–29)
CREAT SERPL-MCNC: 0.72 MG/DL (ref 0.6–1.1)
ERYTHROCYTE [DISTWIDTH] IN BLOOD BY AUTOMATED COUNT: 14.3 % (ref 11.9–14.6)
GLUCOSE SERPL-MCNC: 167 MG/DL (ref 70–99)
HCT VFR BLD AUTO: 35.3 % (ref 35.8–46.3)
HGB BLD-MCNC: 11.6 G/DL (ref 11.7–15.4)
MCH RBC QN AUTO: 31.5 PG (ref 26.1–32.9)
MCHC RBC AUTO-ENTMCNC: 32.9 G/DL (ref 31.4–35)
MCV RBC AUTO: 95.9 FL (ref 82–102)
NRBC # BLD: 0 K/UL (ref 0–0.2)
PLATELET # BLD AUTO: 231 K/UL (ref 150–450)
PMV BLD AUTO: 9.9 FL (ref 9.4–12.3)
POTASSIUM SERPL-SCNC: 4.1 MMOL/L (ref 3.5–5.1)
RBC # BLD AUTO: 3.68 M/UL (ref 4.05–5.2)
SODIUM SERPL-SCNC: 140 MMOL/L (ref 136–145)
WBC # BLD AUTO: 18.2 K/UL (ref 4.3–11.1)

## 2024-12-28 PROCEDURE — 36415 COLL VENOUS BLD VENIPUNCTURE: CPT

## 2024-12-28 PROCEDURE — 2500000003 HC RX 250 WO HCPCS: Performed by: STUDENT IN AN ORGANIZED HEALTH CARE EDUCATION/TRAINING PROGRAM

## 2024-12-28 PROCEDURE — G0378 HOSPITAL OBSERVATION PER HR: HCPCS

## 2024-12-28 PROCEDURE — 97530 THERAPEUTIC ACTIVITIES: CPT

## 2024-12-28 PROCEDURE — 96376 TX/PRO/DX INJ SAME DRUG ADON: CPT

## 2024-12-28 PROCEDURE — 6360000002 HC RX W HCPCS: Performed by: STUDENT IN AN ORGANIZED HEALTH CARE EDUCATION/TRAINING PROGRAM

## 2024-12-28 PROCEDURE — 85027 COMPLETE CBC AUTOMATED: CPT

## 2024-12-28 PROCEDURE — 96372 THER/PROPH/DIAG INJ SC/IM: CPT

## 2024-12-28 PROCEDURE — 80048 BASIC METABOLIC PNL TOTAL CA: CPT

## 2024-12-28 PROCEDURE — 99024 POSTOP FOLLOW-UP VISIT: CPT | Performed by: STUDENT IN AN ORGANIZED HEALTH CARE EDUCATION/TRAINING PROGRAM

## 2024-12-28 PROCEDURE — 6370000000 HC RX 637 (ALT 250 FOR IP): Performed by: STUDENT IN AN ORGANIZED HEALTH CARE EDUCATION/TRAINING PROGRAM

## 2024-12-28 RX ORDER — OXYCODONE AND ACETAMINOPHEN 10; 325 MG/1; MG/1
1 TABLET ORAL EVERY 6 HOURS
Status: DISCONTINUED | OUTPATIENT
Start: 2024-12-28 | End: 2025-01-01 | Stop reason: HOSPADM

## 2024-12-28 RX ORDER — ENOXAPARIN SODIUM 100 MG/ML
40 INJECTION SUBCUTANEOUS DAILY
Status: DISCONTINUED | OUTPATIENT
Start: 2024-12-28 | End: 2025-01-01 | Stop reason: HOSPADM

## 2024-12-28 RX ORDER — LIDOCAINE 4 G/G
1 PATCH TOPICAL DAILY
Status: DISCONTINUED | OUTPATIENT
Start: 2024-12-28 | End: 2025-01-01 | Stop reason: HOSPADM

## 2024-12-28 RX ORDER — DIAZEPAM 2 MG/1
5 TABLET ORAL EVERY 6 HOURS PRN
Status: DISCONTINUED | OUTPATIENT
Start: 2024-12-28 | End: 2024-12-29

## 2024-12-28 RX ADMIN — SODIUM CHLORIDE, PRESERVATIVE FREE 10 ML: 5 INJECTION INTRAVENOUS at 10:22

## 2024-12-28 RX ADMIN — OXYCODONE AND ACETAMINOPHEN 1 TABLET: 10; 325 TABLET ORAL at 13:50

## 2024-12-28 RX ADMIN — BISACODYL 5 MG: 5 TABLET, COATED ORAL at 08:57

## 2024-12-28 RX ADMIN — SODIUM CHLORIDE, PRESERVATIVE FREE 5 ML: 5 INJECTION INTRAVENOUS at 20:39

## 2024-12-28 RX ADMIN — POLYETHYLENE GLYCOL 3350 17 G: 17 POWDER, FOR SOLUTION ORAL at 08:57

## 2024-12-28 RX ADMIN — LISINOPRIL 40 MG: 20 TABLET ORAL at 08:57

## 2024-12-28 RX ADMIN — OXYCODONE AND ACETAMINOPHEN 1 TABLET: 10; 325 TABLET ORAL at 20:39

## 2024-12-28 RX ADMIN — METHOCARBAMOL 1000 MG: 100 INJECTION INTRAMUSCULAR; INTRAVENOUS at 23:00

## 2024-12-28 RX ADMIN — ACETAMINOPHEN 650 MG: 500 TABLET, FILM COATED ORAL at 10:21

## 2024-12-28 RX ADMIN — METHOCARBAMOL 1000 MG: 100 INJECTION INTRAMUSCULAR; INTRAVENOUS at 06:26

## 2024-12-28 RX ADMIN — HYDROCODONE BITARTRATE AND ACETAMINOPHEN 1 TABLET: 10; 325 TABLET ORAL at 08:54

## 2024-12-28 RX ADMIN — AMLODIPINE BESYLATE 5 MG: 5 TABLET ORAL at 08:57

## 2024-12-28 RX ADMIN — GABAPENTIN 300 MG: 300 CAPSULE ORAL at 20:39

## 2024-12-28 RX ADMIN — ATORVASTATIN CALCIUM 40 MG: 40 TABLET, FILM COATED ORAL at 17:48

## 2024-12-28 RX ADMIN — HYDROCODONE BITARTRATE AND ACETAMINOPHEN 1 TABLET: 10; 325 TABLET ORAL at 03:07

## 2024-12-28 RX ADMIN — METHOCARBAMOL 1000 MG: 100 INJECTION INTRAMUSCULAR; INTRAVENOUS at 15:34

## 2024-12-28 RX ADMIN — FLUOXETINE HYDROCHLORIDE 40 MG: 20 CAPSULE ORAL at 17:48

## 2024-12-28 RX ADMIN — ENOXAPARIN SODIUM 40 MG: 100 INJECTION SUBCUTANEOUS at 12:08

## 2024-12-28 ASSESSMENT — PAIN SCALES - GENERAL
PAINLEVEL_OUTOF10: 3
PAINLEVEL_OUTOF10: 10
PAINLEVEL_OUTOF10: 8
PAINLEVEL_OUTOF10: 0

## 2024-12-28 ASSESSMENT — PAIN DESCRIPTION - DESCRIPTORS
DESCRIPTORS: ACHING;DISCOMFORT;SORE
DESCRIPTORS: ACHING;DISCOMFORT;SPASM

## 2024-12-28 ASSESSMENT — PAIN DESCRIPTION - LOCATION
LOCATION: BACK
LOCATION: BACK

## 2024-12-28 NOTE — PLAN OF CARE
Problem: Safety - Adult  Goal: Free from fall injury  Outcome: Progressing     Problem: Pain  Goal: Verbalizes/displays adequate comfort level or baseline comfort level  Outcome: Progressing     Problem: Discharge Planning  Goal: Discharge to home or other facility with appropriate resources  Outcome: Progressing     Problem: Skin/Tissue Integrity  Goal: Absence of new skin breakdown  Description: 1.  Monitor for areas of redness and/or skin breakdown  2.  Assess vascular access sites hourly  3.  Every 4-6 hours minimum:  Change oxygen saturation probe site  4.  Every 4-6 hours:  If on nasal continuous positive airway pressure, respiratory therapy assess nares and determine need for appliance change or resting period.  Outcome: Progressing     Problem: ABCDS Injury Assessment  Goal: Absence of physical injury  Outcome: Progressing

## 2024-12-28 NOTE — PROGRESS NOTES
ACUTE PHYSICAL THERAPY GOALS:   (Developed with and agreed upon by patient and/or caregiver.)  LTG:  (1.)Ms. Jensen will move from supine to sit and sit to supine , scoot up and down, and roll side to side in bed with SUPERVISION within 7 treatment day(s) through log rolling.    (2.)Ms. Jensen will transfer from bed to chair and chair to bed with STAND BY ASSIST using the least restrictive device within 7 treatment day(s).    (3.)Ms. Jensen will ambulate with STAND BY ASSIST for 250+ feet with the least restrictive device within 7 treatment day(s).  (4.)Ms. Jensen will tolerate 23+ minutes of therapeutic activity within 7 treatment days for increased activity tolerance and overall functional mobility.    (5.)Ms Jensen will verbalize 3/3 spinal precautions within 7 treatment days independently for post op management.     PHYSICAL THERAPY: Daily Note PM   (Link to Caseload Tracking: PT Visit Days : 2  Time In/Out PT Charge Capture  Rehab Caseload Tracker  Orders    Spinal precautions    Bonny Jensen is a 69 y.o. 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 Jensen is supine on arrival, oriented x 2. Pt with increased pain despite pain medication this am prior to session. Reviewed spinal precautions and log rolling for pt. Pt required max a x 1 for log rolling and supine to sit. Pt uncomfortable in sitting,  reports increased pain. Pt with min/mod A x 1 for sit to stand, B knees flexed at times, cues for posture and weight shifting. Pt with only few steps bed to chair this am using RW, mod A support. Pt B knees buckling 1/2 through transfer to chair, unable to straighten, reports too much pain/cramping; required max A to chair. Pt limited this am overall from yesterday afternoon; making little progress this am. PT to cont to follow for acute care needs.      Pm note: pt supine, drowsy, some confusion in conversation. Pt now fitted with TLSO brace, max A rolling to L, supine to sit. Pt with decreased sitting balance this afternoon, required min A support. Pt with mod A sit to stand transfer, unable to stand fully, B knees flexed, continues to state having \"muscle spasms\" in buttocks and down LE. Pt able to SPT using RW bed to BSC, BSC to chair; all with MOD/MAX A support. Pt unsafe to ambulate further at this time due to fall risk, unable to stand fully erect with use of RW, pain limiting overall mobility. Pt making little to no progress with activity today, PT to cont to follow for acute care needs.      SUBJECTIVE:   Ms. Jensen states, \"I just had to get up\"     Social/Functional Lives With: Alone  Type of Home: Apartment  Home Layout: One level  Bathroom Shower/Tub: Walk-in shower  Bathroom Toilet: Bedside commode  Bathroom Equipment: Grab bars in shower, Shower chair  Home Equipment: Cane  Prior Level of Assist for ADLs: Independent  Prior Level of Assist for Homemaking: Independent  Prior Level of Assist for Transfers: Independent  Active : No  Occupation: Retired  OBJECTIVE:     PAIN: VITALS / O2: PRECAUTION / LINES / DRAINS:   Pre Treatment:    8/10 in supine, had medication      Post Treatment: 10/10 with mobility, in chair, repositioned for comfort Vitals    WFL    Oxygen   RA None    RESTRICTIONS/PRECAUTIONS:  Restrictions/Precautions  Restrictions/Precautions: Fall Risk  Position Activity

## 2024-12-28 NOTE — PROGRESS NOTES
reports increased pain. Pt with min/mod A x 1 for sit to stand, B knees flexed at times, cues for posture and weight shifting. Pt with only few steps bed to chair this am using RW, mod A support. Pt B knees buckling 1/2 through transfer to chair, unable to straighten, reports too much pain/cramping; required max A to chair. Pt limited this am overall from yesterday afternoon; making little progress this am. PT to cont to follow for acute care needs.       SUBJECTIVE:   Ms. Jensen states, \"I don't think I can do this\"     Social/Functional Lives With: Alone  Type of Home: Apartment  Home Layout: One level  Bathroom Shower/Tub: Walk-in shower  Bathroom Toilet: Bedside commode  Bathroom Equipment: Grab bars in shower, Shower chair  Home Equipment: Cane  Prior Level of Assist for ADLs: Independent  Prior Level of Assist for Homemaking: Independent  Prior Level of Assist for Transfers: Independent  Active : No  Occupation: Retired  OBJECTIVE:     PAIN: VITALS / O2: PRECAUTION / LINES / DRAINS:   Pre Treatment:    0/10 with no mobility      Post Treatment: 10/10 with mobility, in chair, repositioned for comfort Vitals    WFL    Oxygen   RA External Catheter    RESTRICTIONS/PRECAUTIONS:  Restrictions/Precautions  Restrictions/Precautions: Fall Risk  Position Activity Restriction  Spinal Precautions: No Bending, No Lifting, No Twisting  Restrictions/Precautions: Fall Risk     MOBILITY: I Mod I S SBA CGA Min Mod Max Total  NT x2 Comments:   Bed Mobility    Rolling [] [] [] [] [] [] [] [x] [] [] [] To L side   Supine to Sit [] [] [] [] [] [] [x] [] [] [] []    Scooting [] [] [] [] [] [x] [] [] [] [] []    Sit to Supine [] [] [] [] [] [] [] [] [] [x] [] In chair   Transfers    Sit to Stand [] [] [] [] [] [x] [x] [] [] [] []    Bed to Chair [] [] [] [] [] [] [x] [x] [] [] [] Pt buckling 1/2 through transfer to chair   Stand to Sit [] [] [] [] [] [] [] [x] [] [] []     [] [] [] [] [] [] [] [] [] [] []    I=Independent, Mod

## 2024-12-28 NOTE — PROGRESS NOTES
[facility]    Assessment: Ms. Jensen is a 69 y.o. female 2 Days Post-Op from Procedure(s) (LRB):  L2-L5 laminectomy for decompression, and an L2-3 discectomy (N/A).          Plan:  -PT OT evaluated patient yesterday.  Concern for possible needing short-term rehab.  Will need to reevaluate today  -Will continue to work on pain control  -Will order patient a back brace for support  -Disposition ongoing, will reevaluate tomorrow    Subjective: Patient was able to ambulate about 150 feet with physical therapy yesterday.  Patient continues to have persistent back pain likely due to muscle spasm    Objective:    Neurologic Exam:  Afebrile  AOx3  Incision: Clean, dry and intact    Motor      Lower Extremity Right Left   Illiopsoas (L2,3) 5 5   Quadriceps (L3,4) 5 5   Dorsiflexion (L4,5) 5 5   EHL (L5,S1) 5 5   Gastrocnemius (S1,2) 5 5       Vitals:    12/28/24 0744   BP: (!) 166/77   Pulse: 80   Resp: 14   Temp: 98.8 °F (37.1 °C)   SpO2: 93%         Fito Maddox MD

## 2024-12-28 NOTE — PLAN OF CARE
Problem: Safety - Adult  Goal: Free from fall injury  12/28/2024 1248 by Courtney Nunez RN  Outcome: Progressing  12/28/2024 0109 by Megha Limon RN  Outcome: Progressing     Problem: Pain  Goal: Verbalizes/displays adequate comfort level or baseline comfort level  12/28/2024 1248 by Courtney Nunez RN  Outcome: Progressing  12/28/2024 0109 by Megha Limon RN  Outcome: Progressing     Problem: Discharge Planning  Goal: Discharge to home or other facility with appropriate resources  12/28/2024 1248 by Courtney Nunez RN  Outcome: Progressing  12/28/2024 0109 by Megha Limon RN  Outcome: Progressing     Problem: Skin/Tissue Integrity  Goal: Absence of new skin breakdown  Description: 1.  Monitor for areas of redness and/or skin breakdown  2.  Assess vascular access sites hourly  3.  Every 4-6 hours minimum:  Change oxygen saturation probe site  4.  Every 4-6 hours:  If on nasal continuous positive airway pressure, respiratory therapy assess nares and determine need for appliance change or resting period.  12/28/2024 1248 by Courtney Nunez RN  Outcome: Progressing  12/28/2024 0109 by Megha Limon RN  Outcome: Progressing     Problem: ABCDS Injury Assessment  Goal: Absence of physical injury  12/28/2024 1248 by Courtney Nunez RN  Outcome: Progressing  12/28/2024 0109 by Megha Limon RN  Outcome: Progressing

## 2024-12-29 PROCEDURE — 97530 THERAPEUTIC ACTIVITIES: CPT

## 2024-12-29 PROCEDURE — 96372 THER/PROPH/DIAG INJ SC/IM: CPT

## 2024-12-29 PROCEDURE — G0378 HOSPITAL OBSERVATION PER HR: HCPCS

## 2024-12-29 PROCEDURE — 6370000000 HC RX 637 (ALT 250 FOR IP): Performed by: STUDENT IN AN ORGANIZED HEALTH CARE EDUCATION/TRAINING PROGRAM

## 2024-12-29 PROCEDURE — 96376 TX/PRO/DX INJ SAME DRUG ADON: CPT

## 2024-12-29 PROCEDURE — 99024 POSTOP FOLLOW-UP VISIT: CPT | Performed by: STUDENT IN AN ORGANIZED HEALTH CARE EDUCATION/TRAINING PROGRAM

## 2024-12-29 PROCEDURE — 2500000003 HC RX 250 WO HCPCS: Performed by: STUDENT IN AN ORGANIZED HEALTH CARE EDUCATION/TRAINING PROGRAM

## 2024-12-29 PROCEDURE — 6360000002 HC RX W HCPCS: Performed by: STUDENT IN AN ORGANIZED HEALTH CARE EDUCATION/TRAINING PROGRAM

## 2024-12-29 RX ORDER — DIAZEPAM 2 MG/1
5 TABLET ORAL EVERY 6 HOURS PRN
Status: DISCONTINUED | OUTPATIENT
Start: 2024-12-29 | End: 2025-01-01 | Stop reason: HOSPADM

## 2024-12-29 RX ADMIN — SODIUM CHLORIDE, PRESERVATIVE FREE 5 ML: 5 INJECTION INTRAVENOUS at 20:49

## 2024-12-29 RX ADMIN — OXYCODONE AND ACETAMINOPHEN 1 TABLET: 10; 325 TABLET ORAL at 02:56

## 2024-12-29 RX ADMIN — METHOCARBAMOL 1000 MG: 100 INJECTION INTRAMUSCULAR; INTRAVENOUS at 14:17

## 2024-12-29 RX ADMIN — POLYETHYLENE GLYCOL 3350 17 G: 17 POWDER, FOR SOLUTION ORAL at 08:13

## 2024-12-29 RX ADMIN — GABAPENTIN 300 MG: 300 CAPSULE ORAL at 20:48

## 2024-12-29 RX ADMIN — OXYCODONE AND ACETAMINOPHEN 1 TABLET: 10; 325 TABLET ORAL at 14:16

## 2024-12-29 RX ADMIN — LISINOPRIL 40 MG: 20 TABLET ORAL at 08:13

## 2024-12-29 RX ADMIN — METHOCARBAMOL 1000 MG: 100 INJECTION INTRAMUSCULAR; INTRAVENOUS at 22:28

## 2024-12-29 RX ADMIN — METHOCARBAMOL 1000 MG: 100 INJECTION INTRAMUSCULAR; INTRAVENOUS at 05:54

## 2024-12-29 RX ADMIN — AMLODIPINE BESYLATE 5 MG: 5 TABLET ORAL at 08:13

## 2024-12-29 RX ADMIN — ATORVASTATIN CALCIUM 40 MG: 40 TABLET, FILM COATED ORAL at 17:04

## 2024-12-29 RX ADMIN — ACETAMINOPHEN 650 MG: 500 TABLET, FILM COATED ORAL at 20:48

## 2024-12-29 RX ADMIN — ENOXAPARIN SODIUM 40 MG: 100 INJECTION SUBCUTANEOUS at 08:13

## 2024-12-29 RX ADMIN — OXYCODONE AND ACETAMINOPHEN 1 TABLET: 10; 325 TABLET ORAL at 20:49

## 2024-12-29 RX ADMIN — SODIUM CHLORIDE, PRESERVATIVE FREE 10 ML: 5 INJECTION INTRAVENOUS at 08:20

## 2024-12-29 RX ADMIN — FLUOXETINE HYDROCHLORIDE 40 MG: 20 CAPSULE ORAL at 17:04

## 2024-12-29 RX ADMIN — BISACODYL 5 MG: 5 TABLET, COATED ORAL at 08:13

## 2024-12-29 RX ADMIN — OXYCODONE AND ACETAMINOPHEN 1 TABLET: 10; 325 TABLET ORAL at 08:12

## 2024-12-29 ASSESSMENT — PAIN DESCRIPTION - LOCATION
LOCATION: BACK
LOCATION: BACK

## 2024-12-29 ASSESSMENT — PAIN DESCRIPTION - DESCRIPTORS
DESCRIPTORS: ACHING
DESCRIPTORS: ACHING

## 2024-12-29 ASSESSMENT — PAIN DESCRIPTION - ORIENTATION
ORIENTATION: MID
ORIENTATION: MID

## 2024-12-29 ASSESSMENT — PAIN SCALES - GENERAL
PAINLEVEL_OUTOF10: 7
PAINLEVEL_OUTOF10: 6

## 2024-12-29 NOTE — PROGRESS NOTES
ACUTE PHYSICAL THERAPY GOALS:   (Developed with and agreed upon by patient and/or caregiver.)  LTG:  (1.)Ms. Jensen will move from supine to sit and sit to supine , scoot up and down, and roll side to side in bed with SUPERVISION within 7 treatment day(s) through log rolling.    (2.)Ms. Jensen will transfer from bed to chair and chair to bed with STAND BY ASSIST using the least restrictive device within 7 treatment day(s).    (3.)Ms. Jensen will ambulate with STAND BY ASSIST for 250+ feet with the least restrictive device within 7 treatment day(s).  (4.)Ms. Jensen will tolerate 23+ minutes of therapeutic activity within 7 treatment days for increased activity tolerance and overall functional mobility.    (5.)Ms Jensen will verbalize 3/3 spinal precautions within 7 treatment days independently for post op management.     PHYSICAL THERAPY: Daily Note PM   (Link to Caseload Tracking: PT Visit Days : 3  Time In/Out PT Charge Capture  Rehab Caseload Tracker  Orders    Spinal precautions    Bonny Jensen is a 69 y.o. 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 Jensen is supine on arrival, TLSO brace donned, reports did not take it off last night. Pt rolling around, writhing in pain on arrival, has had medication. Pt reports pain in buttocks and L thigh. Pt appears unable to get comfortable in bed. Pt is A & O x 3

## 2024-12-29 NOTE — PROGRESS NOTES
[facility]    Assessment: Ms. Jensen is a 69 y.o. female 3 Days Post-Op from Procedure(s) (LRB):  L2-L5 laminectomy for decompression, and an L2-3 discectomy (N/A).          Plan:  -PT OT evaluated patient yesterday.  Based on how patient is doing they recommend short-term rehab  -Will continue to work on pain control  -TLSO brace ordered  -Dispos: Would appreciate case management/'s help in placement to short-term rehab    Subjective: Patient continues to have lower back and bilateral hip    Objective:    Neurologic Exam:  Afebrile  AOx3  Incision: Clean, dry and intact    Motor      Lower Extremity Right Left   Illiopsoas (L2,3) 5 5   Quadriceps (L3,4) 5 5   Dorsiflexion (L4,5) 5 5   EHL (L5,S1) 5 5   Gastrocnemius (S1,2) 5 5       Vitals:    12/29/24 0723   BP: (!) 150/68   Pulse: 80   Resp: 16   Temp: 97.9 °F (36.6 °C)   SpO2: 93%         Fito Maddox MD

## 2024-12-29 NOTE — PROGRESS NOTES
ACUTE PHYSICAL THERAPY GOALS:   (Developed with and agreed upon by patient and/or caregiver.)  LTG:  (1.)Ms. Jensen will move from supine to sit and sit to supine , scoot up and down, and roll side to side in bed with SUPERVISION within 7 treatment day(s) through log rolling.    (2.)Ms. Jensen will transfer from bed to chair and chair to bed with STAND BY ASSIST using the least restrictive device within 7 treatment day(s).    (3.)Ms. Jensen will ambulate with STAND BY ASSIST for 250+ feet with the least restrictive device within 7 treatment day(s).  (4.)Ms. Jensen will tolerate 23+ minutes of therapeutic activity within 7 treatment days for increased activity tolerance and overall functional mobility.    (5.)Ms Jensen will verbalize 3/3 spinal precautions within 7 treatment days independently for post op management.     PHYSICAL THERAPY: Daily Note AM   (Link to Caseload Tracking: PT Visit Days : 3  Time In/Out PT Charge Capture  Rehab Caseload Tracker  Orders    Spinal precautions    Bonny Jensen is a 69 y.o. 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 Jensen is supine on arrival, TLSO brace donned, reports did not take it off last night. Pt rolling around, writhing in pain on arrival, has had medication. Pt reports pain in buttocks and L thigh. Pt appears unable to get comfortable in bed. Pt is A & O x 3  reach, Chair, Needs within reach, and RN notified    INTERDISCIPLINARY COLLABORATION:  RN/ PCT and PT/ PTA    EDUCATION: Education Given To: Patient  Education Provided: Transfer Training  Education Method: Verbal  Barriers to Learning: None  Education Outcome: Continued education needed    TIME IN/OUT:  Time In: 0920  Time Out: 0948  Minutes: 28    Molly Frederick, PT

## 2024-12-29 NOTE — PLAN OF CARE
Problem: Safety - Adult  Goal: Free from fall injury  12/29/2024 0029 by Charla Salomon RN  Outcome: Progressing  Flowsheets (Taken 12/28/2024 1940)  Free From Fall Injury: Instruct family/caregiver on patient safety  12/28/2024 1248 by Courtney Nunez RN  Outcome: Progressing     Problem: Pain  Goal: Verbalizes/displays adequate comfort level or baseline comfort level  12/29/2024 0029 by Charla Salomon RN  Outcome: Progressing  Flowsheets (Taken 12/28/2024 1940)  Verbalizes/displays adequate comfort level or baseline comfort level:   Encourage patient to monitor pain and request assistance   Assess pain using appropriate pain scale  12/28/2024 1248 by Courtney Nunez RN  Outcome: Progressing     Problem: Discharge Planning  Goal: Discharge to home or other facility with appropriate resources  12/29/2024 0029 by Charla Salomon RN  Outcome: Progressing  Flowsheets (Taken 12/28/2024 1940)  Discharge to home or other facility with appropriate resources:   Identify barriers to discharge with patient and caregiver   Identify discharge learning needs (meds, wound care, etc)  12/28/2024 1248 by Courtney Nunez RN  Outcome: Progressing     Problem: Skin/Tissue Integrity  Goal: Absence of new skin breakdown  Description: 1.  Monitor for areas of redness and/or skin breakdown  2.  Assess vascular access sites hourly  3.  Every 4-6 hours minimum:  Change oxygen saturation probe site  4.  Every 4-6 hours:  If on nasal continuous positive airway pressure, respiratory therapy assess nares and determine need for appliance change or resting period.  12/29/2024 0029 by Charla Salomon RN  Outcome: Progressing  12/28/2024 1248 by Courtney Nunez RN  Outcome: Progressing     Problem: ABCDS Injury Assessment  Goal: Absence of physical injury  12/29/2024 0029 by Charla Salomon RN  Outcome: Progressing  Flowsheets (Taken 12/28/2024 1940)  Absence of Physical Injury: Implement safety measures based on patient assessment  12/28/2024 1248 by Courtney Nunez  RN  Outcome: Progressing

## 2024-12-30 PROCEDURE — 96372 THER/PROPH/DIAG INJ SC/IM: CPT

## 2024-12-30 PROCEDURE — G0378 HOSPITAL OBSERVATION PER HR: HCPCS

## 2024-12-30 PROCEDURE — 99024 POSTOP FOLLOW-UP VISIT: CPT | Performed by: STUDENT IN AN ORGANIZED HEALTH CARE EDUCATION/TRAINING PROGRAM

## 2024-12-30 PROCEDURE — 6370000000 HC RX 637 (ALT 250 FOR IP): Performed by: STUDENT IN AN ORGANIZED HEALTH CARE EDUCATION/TRAINING PROGRAM

## 2024-12-30 PROCEDURE — 2500000003 HC RX 250 WO HCPCS: Performed by: STUDENT IN AN ORGANIZED HEALTH CARE EDUCATION/TRAINING PROGRAM

## 2024-12-30 PROCEDURE — 6360000002 HC RX W HCPCS: Performed by: STUDENT IN AN ORGANIZED HEALTH CARE EDUCATION/TRAINING PROGRAM

## 2024-12-30 PROCEDURE — 96376 TX/PRO/DX INJ SAME DRUG ADON: CPT

## 2024-12-30 RX ADMIN — BISACODYL 5 MG: 5 TABLET, COATED ORAL at 09:08

## 2024-12-30 RX ADMIN — ACETAMINOPHEN 650 MG: 500 TABLET, FILM COATED ORAL at 02:08

## 2024-12-30 RX ADMIN — OXYCODONE AND ACETAMINOPHEN 1 TABLET: 10; 325 TABLET ORAL at 21:08

## 2024-12-30 RX ADMIN — OXYCODONE AND ACETAMINOPHEN 1 TABLET: 10; 325 TABLET ORAL at 13:53

## 2024-12-30 RX ADMIN — ACETAMINOPHEN 650 MG: 500 TABLET, FILM COATED ORAL at 09:09

## 2024-12-30 RX ADMIN — SODIUM CHLORIDE, PRESERVATIVE FREE 10 ML: 5 INJECTION INTRAVENOUS at 09:11

## 2024-12-30 RX ADMIN — ENOXAPARIN SODIUM 40 MG: 100 INJECTION SUBCUTANEOUS at 09:10

## 2024-12-30 RX ADMIN — METHOCARBAMOL 1000 MG: 100 INJECTION INTRAMUSCULAR; INTRAVENOUS at 14:44

## 2024-12-30 RX ADMIN — OXYCODONE AND ACETAMINOPHEN 1 TABLET: 10; 325 TABLET ORAL at 02:07

## 2024-12-30 RX ADMIN — GABAPENTIN 300 MG: 300 CAPSULE ORAL at 21:07

## 2024-12-30 RX ADMIN — ATORVASTATIN CALCIUM 40 MG: 40 TABLET, FILM COATED ORAL at 16:51

## 2024-12-30 RX ADMIN — METHOCARBAMOL 1000 MG: 100 INJECTION INTRAMUSCULAR; INTRAVENOUS at 05:26

## 2024-12-30 RX ADMIN — AMLODIPINE BESYLATE 5 MG: 5 TABLET ORAL at 09:09

## 2024-12-30 RX ADMIN — LISINOPRIL 40 MG: 20 TABLET ORAL at 09:09

## 2024-12-30 RX ADMIN — SODIUM CHLORIDE, PRESERVATIVE FREE 5 ML: 5 INJECTION INTRAVENOUS at 21:08

## 2024-12-30 RX ADMIN — OXYCODONE AND ACETAMINOPHEN 1 TABLET: 10; 325 TABLET ORAL at 09:09

## 2024-12-30 RX ADMIN — METHOCARBAMOL 1000 MG: 100 INJECTION INTRAMUSCULAR; INTRAVENOUS at 21:07

## 2024-12-30 RX ADMIN — ACETAMINOPHEN 650 MG: 500 TABLET, FILM COATED ORAL at 21:07

## 2024-12-30 RX ADMIN — FLUOXETINE HYDROCHLORIDE 40 MG: 20 CAPSULE ORAL at 16:51

## 2024-12-30 ASSESSMENT — PAIN SCALES - GENERAL
PAINLEVEL_OUTOF10: 0
PAINLEVEL_OUTOF10: 0

## 2024-12-30 NOTE — PLAN OF CARE
Problem: Safety - Adult  Goal: Free from fall injury  12/29/2024 1949 by Nesha Schultz RN  Outcome: Progressing  12/29/2024 1620 by Charla Padron RN  Outcome: Progressing     Problem: Pain  Goal: Verbalizes/displays adequate comfort level or baseline comfort level  12/29/2024 1949 by Nesha Schultz RN  Outcome: Progressing  12/29/2024 1620 by Charla Padron RN  Outcome: Progressing     Problem: Discharge Planning  Goal: Discharge to home or other facility with appropriate resources  12/29/2024 1949 by Nesha Schultz RN  Outcome: Progressing  12/29/2024 1620 by Charla Padron RN  Outcome: Progressing     Problem: Skin/Tissue Integrity  Goal: Absence of new skin breakdown  12/29/2024 1949 by Nesha Schultz RN  Outcome: Progressing  12/29/2024 1620 by Charla Padron RN  Outcome: Progressing     Problem: ABCDS Injury Assessment  Goal: Absence of physical injury  12/29/2024 1949 by Nesha Schultz RN  Outcome: Progressing  12/29/2024 1620 by Charla Padron RN  Outcome: Progressing

## 2024-12-30 NOTE — PROGRESS NOTES
Occupational Therapy Note:    Attempted to see patient this AM for occupational therapy treatment  session. Patient was with other providers in discussion and refused OT at this time.   Will follow and re-attempt as schedule permits/patient available. Thank you,   ALCIDES Mckinney    Rehab Caseload Tracker

## 2024-12-30 NOTE — PROGRESS NOTES
Physical Therapy Note:    Attempted to see patient this AM for physical therapy treatment  session. Patient was with other providers in discussion and refused PT at this time. Will follow and re-attempt as schedule permits/patient available. Thank you,    Awa Chambers, PTA     Rehab Caseload Tracker

## 2024-12-30 NOTE — PROGRESS NOTES
[facility]    Assessment: Ms. Jensen is a 69 y.o. female 4 Days Post-Op from Procedure(s) (LRB):  L2-L5 laminectomy for decompression, and an L2-3 discectomy (N/A).          Plan:  -Therapy recommends short-term rehab.  Will appreciate case management's/social workers help in placement.  P-Will continue to work on pain control  -TLSO brace ordered  -Disposition: short-term rehab pending    Subjective: Patient reports pain is slightly better today depending on the position.    Objective:    Neurologic Exam:  Afebrile  AOx3  Incision: Clean, dry and intact    Motor      Lower Extremity Right Left   Illiopsoas (L2,3) 5 5   Quadriceps (L3,4) 5 5   Dorsiflexion (L4,5) 5 5   EHL (L5,S1) 5 5   Gastrocnemius (S1,2) 5 5       Vitals:    12/30/24 0755   BP: 120/83   Pulse: 66   Resp: 16   Temp: 98.4 °F (36.9 °C)   SpO2: 94%         Fito Maddox MD

## 2024-12-31 PROCEDURE — G0378 HOSPITAL OBSERVATION PER HR: HCPCS

## 2024-12-31 PROCEDURE — 97530 THERAPEUTIC ACTIVITIES: CPT

## 2024-12-31 PROCEDURE — 6370000000 HC RX 637 (ALT 250 FOR IP): Performed by: STUDENT IN AN ORGANIZED HEALTH CARE EDUCATION/TRAINING PROGRAM

## 2024-12-31 PROCEDURE — 96376 TX/PRO/DX INJ SAME DRUG ADON: CPT

## 2024-12-31 PROCEDURE — 76937 US GUIDE VASCULAR ACCESS: CPT

## 2024-12-31 PROCEDURE — 96372 THER/PROPH/DIAG INJ SC/IM: CPT

## 2024-12-31 PROCEDURE — 2500000003 HC RX 250 WO HCPCS: Performed by: STUDENT IN AN ORGANIZED HEALTH CARE EDUCATION/TRAINING PROGRAM

## 2024-12-31 PROCEDURE — 97112 NEUROMUSCULAR REEDUCATION: CPT

## 2024-12-31 PROCEDURE — 97535 SELF CARE MNGMENT TRAINING: CPT

## 2024-12-31 PROCEDURE — 6360000002 HC RX W HCPCS: Performed by: STUDENT IN AN ORGANIZED HEALTH CARE EDUCATION/TRAINING PROGRAM

## 2024-12-31 RX ORDER — METHOCARBAMOL 750 MG/1
750 TABLET, FILM COATED ORAL 4 TIMES DAILY
Status: DISCONTINUED | OUTPATIENT
Start: 2024-12-31 | End: 2025-01-01 | Stop reason: HOSPADM

## 2024-12-31 RX ORDER — GABAPENTIN 300 MG/1
300 CAPSULE ORAL 2 TIMES DAILY
Status: DISCONTINUED | OUTPATIENT
Start: 2024-12-31 | End: 2025-01-01 | Stop reason: HOSPADM

## 2024-12-31 RX ADMIN — SODIUM CHLORIDE, PRESERVATIVE FREE 5 ML: 5 INJECTION INTRAVENOUS at 20:23

## 2024-12-31 RX ADMIN — BISACODYL 5 MG: 5 TABLET, COATED ORAL at 08:58

## 2024-12-31 RX ADMIN — LISINOPRIL 40 MG: 20 TABLET ORAL at 08:58

## 2024-12-31 RX ADMIN — OXYCODONE AND ACETAMINOPHEN 1 TABLET: 10; 325 TABLET ORAL at 20:22

## 2024-12-31 RX ADMIN — ACETAMINOPHEN 650 MG: 500 TABLET, FILM COATED ORAL at 14:09

## 2024-12-31 RX ADMIN — METHOCARBAMOL TABLETS 750 MG: 750 TABLET, COATED ORAL at 20:22

## 2024-12-31 RX ADMIN — ACETAMINOPHEN 650 MG: 500 TABLET, FILM COATED ORAL at 02:36

## 2024-12-31 RX ADMIN — FLUOXETINE HYDROCHLORIDE 40 MG: 20 CAPSULE ORAL at 16:31

## 2024-12-31 RX ADMIN — OXYCODONE AND ACETAMINOPHEN 1 TABLET: 10; 325 TABLET ORAL at 08:58

## 2024-12-31 RX ADMIN — METHOCARBAMOL TABLETS 750 MG: 750 TABLET, COATED ORAL at 13:16

## 2024-12-31 RX ADMIN — ATORVASTATIN CALCIUM 40 MG: 40 TABLET, FILM COATED ORAL at 16:31

## 2024-12-31 RX ADMIN — AMLODIPINE BESYLATE 5 MG: 5 TABLET ORAL at 08:58

## 2024-12-31 RX ADMIN — METHOCARBAMOL 1000 MG: 100 INJECTION INTRAMUSCULAR; INTRAVENOUS at 05:56

## 2024-12-31 RX ADMIN — OXYCODONE AND ACETAMINOPHEN 1 TABLET: 10; 325 TABLET ORAL at 02:37

## 2024-12-31 RX ADMIN — GABAPENTIN 300 MG: 300 CAPSULE ORAL at 20:22

## 2024-12-31 RX ADMIN — OXYCODONE AND ACETAMINOPHEN 1 TABLET: 10; 325 TABLET ORAL at 14:09

## 2024-12-31 RX ADMIN — ENOXAPARIN SODIUM 40 MG: 100 INJECTION SUBCUTANEOUS at 09:00

## 2024-12-31 RX ADMIN — SODIUM CHLORIDE, PRESERVATIVE FREE 10 ML: 5 INJECTION INTRAVENOUS at 09:01

## 2024-12-31 RX ADMIN — POLYETHYLENE GLYCOL 3350 17 G: 17 POWDER, FOR SOLUTION ORAL at 09:00

## 2024-12-31 RX ADMIN — METHOCARBAMOL TABLETS 750 MG: 750 TABLET, COATED ORAL at 16:31

## 2024-12-31 ASSESSMENT — PAIN SCALES - GENERAL
PAINLEVEL_OUTOF10: 0
PAINLEVEL_OUTOF10: 0

## 2024-12-31 NOTE — PROGRESS NOTES
ACUTE OCCUPATIONAL THERAPY GOALS:   (Developed with and agreed upon by patient and/or caregiver.)    1. Patient will verbalize and demonstrate understanding of spinal precautions with 100% accuracy during ADLs.   2. Patient will complete lower body bathing and dressing with MOD I and adaptive equipment as needed.   3. Patient will complete functional transfers with MOD I and adaptive equipment as needed.   4. Patient will complete toileting and toilet transfer with MOD I.   5. Patient will complete functional mobility of household distances with MOD I and adaptive equipment as needed.   6. Patient will demonstrate ability to log roll in bed with MOD I.   Timeframe: 7 visits    OCCUPATIONAL THERAPY: Daily Note PM   OT Visit Days: 2   Time In/Out  OT Charge Capture  Rehab Caseload Tracker  OT Orders    Bonny Jensen is a 69 y.o. 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 Jensen presents up in the chair upon arrival. Pt was able to adjust her socks with CGA and required max a x 2 with sit to stand, using a rolling walker, after prolonged sitting. Pt completed functional mobility from the chair to the bed with a rolling walker and min a x 2. Pt required max a to doff brace and mod a to lift her legs onto bed to complete a log roll back to bed. Pt positioned for comfort and left with belongings in reach.  adaptive equipment, and compensatory technique in supported sitting and unsupported sitting with moderate verbal, manual, and tactile cueing to increase independence, decrease assistance required, increase activity tolerance, increase safety awareness, and maintain precautions. The patient was educated on role of occupational therapy, compensatory technique during ADL , strategies to improve safety, proper use of assistive device, and transfer training and safety and patient will need reinforcement at next session.     TREATMENT GRID:  N/A    AFTER TREATMENT PRECAUTIONS: Alarm Activated, Bed, Bed/Chair Locked, Call light within reach, Needs within reach, RN notified, and Side rails x2    INTERDISCIPLINARY COLLABORATION:  RN/ PCT, PT/ PTA, and OT/ RAHMAN    EDUCATION:       TOTAL TREATMENT DURATION AND TIME:  Time In: 1359  Time Out: 1422  Minutes: 23    ALCIDES Mckinney

## 2024-12-31 NOTE — PROGRESS NOTES
[facility]    Assessment: Ms. Jensen is a 69 y.o. female 5 Days Post-Op from Procedure(s) (LRB):  L2-L5 laminectomy for decompression, and an L2-3 discectomy (N/A).     Plan:  -Therapy recommends short-term rehab.  Will appreciate case management's/social workers help in placement.    -Will continue to work on pain control  -TLSO brace ordered  -Disposition: short-term rehab pending    Subjective: Resting comfortably in bed but has had pain when trying to work with therapy.    Objective:    Neurologic Exam:  Afebrile  AOx3  Incision: Clean dressing, dry and intact    Motor      Lower Extremity Right Left   Illiopsoas (L2,3) 5 5   Quadriceps (L3,4) 5 5   Dorsiflexion (L4,5) 5 5   EHL (L5,S1) 5 5   Gastrocnemius (S1,2) 5 5       Vitals:    12/31/24 0805   BP: (!) 145/71   Pulse: 76   Resp: 18   Temp: 99 °F (37.2 °C)   SpO2: 95%       Alfred Rodriguez MD, FAANS, FCNS   Neurosurgeon  [facility]  [facility]   12/31/2024 at 10:45 AM

## 2024-12-31 NOTE — PLAN OF CARE
Problem: Safety - Adult  Goal: Free from fall injury  Outcome: Progressing     Problem: Pain  Goal: Verbalizes/displays adequate comfort level or baseline comfort level  Outcome: Progressing     Problem: Discharge Planning  Goal: Discharge to home or other facility with appropriate resources  Outcome: Progressing     Problem: Skin/Tissue Integrity  Goal: Absence of new skin breakdown  Outcome: Progressing     Problem: ABCDS Injury Assessment  Goal: Absence of physical injury  Outcome: Progressing

## 2024-12-31 NOTE — PROGRESS NOTES
ACUTE PHYSICAL THERAPY GOALS:   (Developed with and agreed upon by patient and/or caregiver.)  LTG:  (1.)Ms. Jensen will move from supine to sit and sit to supine , scoot up and down, and roll side to side in bed with SUPERVISION within 7 treatment day(s) through log rolling.    (2.)Ms. Jensen will transfer from bed to chair and chair to bed with STAND BY ASSIST using the least restrictive device within 7 treatment day(s).    (3.)Ms. Jensen will ambulate with STAND BY ASSIST for 250+ feet with the least restrictive device within 7 treatment day(s).  (4.)Ms. Jensen will tolerate 23+ minutes of therapeutic activity within 7 treatment days for increased activity tolerance and overall functional mobility.    (5.)Ms Jensen will verbalize 3/3 spinal precautions within 7 treatment days independently for post op management.     PHYSICAL THERAPY: Daily Note AM   (Link to Caseload Tracking: PT Visit Days : 4  Time In/Out PT Charge Capture  Rehab Caseload Tracker  Orders    Spinal precautions    Bonny Jensen is a 69 y.o. 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 Jensen is supine upon contact and agreeable to mobility with encouragement.  The patient performed log rolling with mod A and supine to sit to the edge of the bed.  Additional time was needed for all mobility today, but once EOB she required an extended

## 2024-12-31 NOTE — PROGRESS NOTES
ACUTE PHYSICAL THERAPY GOALS:   (Developed with and agreed upon by patient and/or caregiver.)  LTG:  (1.)Ms. Jensen will move from supine to sit and sit to supine , scoot up and down, and roll side to side in bed with SUPERVISION within 7 treatment day(s) through log rolling.    (2.)Ms. Jensen will transfer from bed to chair and chair to bed with STAND BY ASSIST using the least restrictive device within 7 treatment day(s).    (3.)Ms. Jensen will ambulate with STAND BY ASSIST for 250+ feet with the least restrictive device within 7 treatment day(s).  (4.)Ms. Jensen will tolerate 23+ minutes of therapeutic activity within 7 treatment days for increased activity tolerance and overall functional mobility.    (5.)Ms Jensen will verbalize 3/3 spinal precautions within 7 treatment days independently for post op management.     PHYSICAL THERAPY: Daily Note PM   (Link to Caseload Tracking: PT Visit Days : 4  Time In/Out PT Charge Capture  Rehab Caseload Tracker  Orders    Spinal precautions    Bonny Jensen is a 69 y.o. 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 Jensen was seated in the recliner and had slid out in the chair.  She required cueing for scooting back to ensure safety.  The patient was more stiff this afternoon and required mod/max Ax2 for STS.  She took a few small steps back to bed with the RW and    Sitting Static [] [x] [] [] [] []    Sitting Dynamic [] [] [x] [] [] []              Standing Static [] [] [x] [x] [] []    Standing Dynamic [] [] [] [x] [] []      GAIT: I Mod I S SBA CGA Min Mod Max Total  NT x2 Comments:   Level of Assistance [] [] [] [] [] [x] [] [] [] [] [x]    Distance 2  feet    DME Rolling Walker and TLSO    Gait Quality Antalgic, Decreased ger , Decreased step clearance, Decreased step length, Decreased stance, and Narrow base of support    Weightbearing Status      Stairs      I=Independent, Mod I=Modified Independent, S=Supervision, SBA=Standby Assistance, CGA=Contact Guard Assistance,   Min=Minimal Assistance, Mod=Moderate Assistance, Max=Maximal Assistance, Total=Total Assistance, NT=Not Tested    PLAN:   FREQUENCY AND DURATION: BID for duration of hospital stay or until stated goals are met, whichever comes first.    TREATMENT:   TREATMENT:   Co-Treatment PT/OT necessary due to patient's decreased overall endurance/tolerance levels, as well as need for high level skilled assistance to complete functional transfers/mobility and functional tasks  Therapeutic Activity (23 Minutes): Therapeutic activity included Rolling, Sit to Supine, Scooting, Transfer Training, Ambulation on level ground, Sitting balance , and Standing balance to improve functional Activity tolerance, Balance, Coordination, Mobility, Strength, and ROM.    TREATMENT GRID:  N/A    AFTER TREATMENT PRECAUTIONS: Alarm Activated, Bed, Bed/Chair Locked, Call light within reach, Needs within reach, and RN notified    INTERDISCIPLINARY COLLABORATION:  RN/ PCT, PT/ PTA, and OT/ RAHMAN    EDUCATION:      TIME IN/OUT:  Time In: 1359  Time Out: 1422  Minutes: 23    Awa Chambers PTA

## 2025-01-01 VITALS
RESPIRATION RATE: 18 BRPM | HEIGHT: 63 IN | OXYGEN SATURATION: 98 % | BODY MASS INDEX: 28.88 KG/M2 | SYSTOLIC BLOOD PRESSURE: 143 MMHG | HEART RATE: 70 BPM | TEMPERATURE: 98.4 F | WEIGHT: 163 LBS | DIASTOLIC BLOOD PRESSURE: 60 MMHG

## 2025-01-01 PROCEDURE — 6360000002 HC RX W HCPCS: Performed by: STUDENT IN AN ORGANIZED HEALTH CARE EDUCATION/TRAINING PROGRAM

## 2025-01-01 PROCEDURE — 2500000003 HC RX 250 WO HCPCS: Performed by: STUDENT IN AN ORGANIZED HEALTH CARE EDUCATION/TRAINING PROGRAM

## 2025-01-01 PROCEDURE — 6370000000 HC RX 637 (ALT 250 FOR IP): Performed by: STUDENT IN AN ORGANIZED HEALTH CARE EDUCATION/TRAINING PROGRAM

## 2025-01-01 PROCEDURE — G0378 HOSPITAL OBSERVATION PER HR: HCPCS

## 2025-01-01 PROCEDURE — 1100000000 HC RM PRIVATE

## 2025-01-01 PROCEDURE — 96372 THER/PROPH/DIAG INJ SC/IM: CPT

## 2025-01-01 RX ORDER — OXYCODONE AND ACETAMINOPHEN 10; 325 MG/1; MG/1
1 TABLET ORAL EVERY 6 HOURS
Qty: 20 TABLET | Refills: 0 | Status: SHIPPED | OUTPATIENT
Start: 2025-01-01 | End: 2025-01-06

## 2025-01-01 RX ADMIN — GABAPENTIN 300 MG: 300 CAPSULE ORAL at 08:48

## 2025-01-01 RX ADMIN — ENOXAPARIN SODIUM 40 MG: 100 INJECTION SUBCUTANEOUS at 08:47

## 2025-01-01 RX ADMIN — POLYETHYLENE GLYCOL 3350 17 G: 17 POWDER, FOR SOLUTION ORAL at 08:46

## 2025-01-01 RX ADMIN — METHOCARBAMOL TABLETS 750 MG: 750 TABLET, COATED ORAL at 08:48

## 2025-01-01 RX ADMIN — SODIUM CHLORIDE, PRESERVATIVE FREE 10 ML: 5 INJECTION INTRAVENOUS at 08:45

## 2025-01-01 RX ADMIN — OXYCODONE AND ACETAMINOPHEN 1 TABLET: 10; 325 TABLET ORAL at 08:48

## 2025-01-01 RX ADMIN — OXYCODONE AND ACETAMINOPHEN 1 TABLET: 10; 325 TABLET ORAL at 03:03

## 2025-01-01 RX ADMIN — OXYCODONE AND ACETAMINOPHEN 1 TABLET: 10; 325 TABLET ORAL at 14:06

## 2025-01-01 RX ADMIN — LISINOPRIL 40 MG: 20 TABLET ORAL at 08:48

## 2025-01-01 RX ADMIN — ACETAMINOPHEN 650 MG: 500 TABLET, FILM COATED ORAL at 08:48

## 2025-01-01 RX ADMIN — BISACODYL 5 MG: 5 TABLET, COATED ORAL at 08:48

## 2025-01-01 RX ADMIN — METHOCARBAMOL TABLETS 750 MG: 750 TABLET, COATED ORAL at 14:00

## 2025-01-01 RX ADMIN — AMLODIPINE BESYLATE 5 MG: 5 TABLET ORAL at 08:48

## 2025-01-01 RX ADMIN — ACETAMINOPHEN 650 MG: 500 TABLET, FILM COATED ORAL at 03:03

## 2025-01-01 NOTE — PLAN OF CARE
Problem: Safety - Adult  Goal: Free from fall injury  1/1/2025 0825 by Vane Nicholas RN  Outcome: Progressing  12/31/2024 2218 by Donta Campbell RN  Outcome: Progressing     Problem: Pain  Goal: Verbalizes/displays adequate comfort level or baseline comfort level  1/1/2025 0825 by Vane Nicholas RN  Outcome: Progressing  12/31/2024 2218 by Donta Campbell RN  Outcome: Progressing     Problem: Discharge Planning  Goal: Discharge to home or other facility with appropriate resources  1/1/2025 0825 by Vane Nicholas RN  Outcome: Progressing  12/31/2024 2218 by Donta Campbell RN  Outcome: Progressing     Problem: Skin/Tissue Integrity  Goal: Absence of new skin breakdown  Description: 1.  Monitor for areas of redness and/or skin breakdown  2.  Assess vascular access sites hourly  3.  Every 4-6 hours minimum:  Change oxygen saturation probe site  4.  Every 4-6 hours:  If on nasal continuous positive airway pressure, respiratory therapy assess nares and determine need for appliance change or resting period.  1/1/2025 0825 by Vane Nicholas RN  Outcome: Progressing  12/31/2024 2218 by Donta Campbell RN  Outcome: Progressing     Problem: ABCDS Injury Assessment  Goal: Absence of physical injury  1/1/2025 0825 by Vane Nicholas RN  Outcome: Progressing  12/31/2024 2218 by Donta Campbell RN  Outcome: Progressing

## 2025-01-01 NOTE — PROGRESS NOTES
TRANSFER - OUT REPORT:    RN called 3x attempting to give verbal report  to Primary RN on Bonny Jensen  being transferred to [facility] 116 for routine progression of patient care

## 2025-01-01 NOTE — DISCHARGE SUMMARY
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]     * Spondylosis [M47.9]  Post-Procedure Diagnosis: SAME AS ABOVE     Admitting Attending: Fito Maddox MD  Discharge Attending: Alfred Rodriguez MD and patient to maintain scheduled follow-up with primary admitting neurosurgery attending    Past Medical History:   Diagnosis Date    Allergic rhinitis     Anxiety and depression     Cervical radiculopathy     COPD (chronic obstructive pulmonary disease) (HCC)     no home O2    High cholesterol     History of bone density study 2023    osteopenia    History of mammogram 2023    History of Papanicolaou smear of cervix 2023    History of seizures     poss seizure - about     Hypertension     managed with med    Osteopenia      Past Surgical History:   Procedure Laterality Date    BUNIONECTOMY Right      SECTION      x3    COLONOSCOPY      s/p polypectomy    DILATION AND CURETTAGE OF UTERUS N/A 2023    HYSTEROSCOPY DILATATION CURETTAGE WITH MYOSURE [place] Jac[place] [facility]    LUMBAR SPINE SURGERY N/A 2024    L2-L5 laminectomy for decompression, and an L2-3 discectomy performed by Fito Maddox MD at [facility]    WRIST SURGERY Left      Allergies   Allergen Reactions    Adhesive Tape Rash    Citalopram Other (See Comments)    Contrast [Iodides] Hives    Iodinated Contrast Media Hives    Ioversol Other (See Comments)       Procedures Performed During Hospitalization:  Performed on 2024  L2-L5 laminectomy for decompression, and an L2-3 discectomy     Summary of Hospital Events:   Bonny Jensen is a 69 y.o. female who

## 2025-01-01 NOTE — PROGRESS NOTES
Physical Therapy Note:    Attempted to see patient this AM for physical therapy treatment  session. Patient adamantly refused despite strong encouragement.  Patient states she does not want to get out of bed today. Will follow and re-attempt as schedule permits/patient available. Thank you,    A Deanna Rome, PT     Rehab Caseload Tracker

## 2025-01-01 NOTE — PROGRESS NOTES
Physical Therapy Note:    Attempted to see patient this PM for physical therapy treatment  session. Patient adamantly refused stating she's leaving today.   Will follow and re-attempt as schedule permits/patient available. Thank you,    A Deanna Rome, PT     Rehab Caseload Tracker

## 2025-01-01 NOTE — CARE COORDINATION
CM met with pt to discuss choice of rehab facility.  Pt had not reviewed the list or made choices.  Pt reviewed list and requested a referral to [facility].  She was agreeable to referrals to other facilities in that area.  Referrals submitted to [facility] and [facility]. Awaiting responses.  CM will initiate the insurance auth request once a bed offer is received and accepted.  CM following.    1542: No beds available at [place], [facility] or [facility].  [facility].  Awaiting a response.  
Pt has been accepted for admission to [facility].  [facility] declined to accept the pt. CM presented bed offer to pt and she was undecided if she wanted to accept the bed or dc home with HH.  CM informed her that CM will proceed with obtaining insurance approval for this facility for admission tomorrow.  CM informed her that she can speak with her surgeon tomorrow about rehab vs home but CM would have the plan in place for rehab.  Insurance auth request submitted via the [employer] online portal.  Approval received for an initial 3 days with the next continued stay review due 11/2/2025.  Auth reference #[id number]. Pt can transfer to rehab tomorrow if she is in agreement, medically cleared for dc and can arrive at the facility prior to 1500.  CM notified Daren Maddox and Jennifer via [employer]. CM following.  
Pt is medically cleared for dc today and will transfer to room 116 at [facility] for STR services.  RN to call report to # [phone]. Transport scheduled for 1445 through [facility].  AVS provided to pt by nursing staff and pt is aware of dc and transport time.  CM remains available to assist as needed.       01/01/25 1401   Service Assessment   Patient Orientation Alert and Oriented   Cognition Alert   History Provided By Patient;Child/Family;Medical Record   Primary Caregiver Self   Accompanied By/Relationship n/a   Support Systems Children;Family Members   Patient's Healthcare Decision Maker is: Legal Next of Kin   PCP Verified by CM Yes   Last Visit to PCP Within last 3 months  (11/19/2024)   Prior Functional Level Independent in ADLs/IADLs   Current Functional Level Bathing;Assistance with the following:;Dressing;Toileting;Housework;Shopping;Cooking;Mobility   Can patient return to prior living arrangement No   Ability to make needs known: Good   Family able to assist with home care needs: No   Would you like for me to discuss the discharge plan with any other family members/significant others, and if so, who? Yes  (son)   Financial Resources Medicaid;Medicare   Community Resources None   Social/Functional History   Lives With Alone   Type of Home Apartment   Home Layout One level   Bathroom Shower/Tub Walk-in shower   Bathroom Toilet Bedside commode   Bathroom Equipment Grab bars in shower;Shower chair   Home Equipment Cane   Prior Level of Assist for ADLs Independent   Prior Level of Assist for Homemaking Independent   Ambulation Assistance Independent   Prior Level of Assist for Transfers Independent   Active  No   Occupation Retired   Discharge Planning   Type of Residence Skilled Nursing Facility   Living Arrangements Alone   Current Services Prior To Admission None;Durable Medical Equipment   Current DME Prior to Arrival Bedside Commode;Cane;Shower Chair   Potential Assistance Needed 
Level of Assist for Transfers Independent   Active  No   Occupation Retired   Discharge Planning   Type of Residence Skilled Nursing Facility   Living Arrangements Alone   Current Services Prior To Admission None   Potential Assistance Needed Skilled Nursing Facility;Home Care   DME Ordered? No   Potential Assistance Purchasing Medications No   Patient expects to be discharged to: Unknown  (home vs STR)   Services At/After Discharge   Transition of Care Consult (CM Consult)   (no CM consult received)   Services At/After Discharge   (TBD)    Resource Information Provided? No   Confirm Follow Up Transport Family   Condition of Participation: Discharge Planning   The Plan for Transition of Care is related to the following treatment goals: to be determined based on pt's progress with therapy and her decision re: STR vs HH   The Patient and/or Patient Representative was provided with a Choice of Provider? Patient;Patient Representative   Name of the Patient Representative who was provided with the Choice of Provider and agrees with the Discharge Plan?  son   Freedom of Choice list was provided with basic dialogue that supports the patient's individualized plan of care/goals, treatment preferences, and shares the quality data associated with the providers?  Yes

## 2025-01-02 ENCOUNTER — CARE COORDINATION (OUTPATIENT)
Dept: CARE COORDINATION | Facility: CLINIC | Age: 70
End: 2025-01-02

## 2025-01-02 NOTE — CARE COORDINATION
Transition of care outreach postponed for 14 days due to patient's discharge to [facility].

## 2025-01-06 ENCOUNTER — NURSE ONLY (OUTPATIENT)
Dept: NEUROSURGERY | Age: 70
End: 2025-01-06

## 2025-01-06 VITALS
SYSTOLIC BLOOD PRESSURE: 102 MMHG | HEART RATE: 60 BPM | DIASTOLIC BLOOD PRESSURE: 52 MMHG | OXYGEN SATURATION: 98 % | TEMPERATURE: 97.2 F

## 2025-01-06 DIAGNOSIS — M48.062 SPINAL STENOSIS OF LUMBAR REGION WITH NEUROGENIC CLAUDICATION: Primary | ICD-10-CM

## 2025-01-06 DIAGNOSIS — M47.9 SPONDYLOSIS: ICD-10-CM

## 2025-01-06 RX ORDER — LACTULOSE 10 G/15ML
20 SOLUTION ORAL 3 TIMES DAILY
COMMUNITY

## 2025-01-06 NOTE — PROGRESS NOTES
alone  Smoking: has stopped smoking   Return appointment with any follow-up films:Patient demonstrated knowledge of procedure and post op instructions; patient was given the opportunity for questions and clarification was provided; also provided post acute with written restrictions, brace orders and wound care

## 2025-01-08 ENCOUNTER — TELEPHONE (OUTPATIENT)
Dept: NEUROSURGERY | Age: 70
End: 2025-01-08

## 2025-01-08 DIAGNOSIS — D49.2 NERVE SHEATH TUMOR: ICD-10-CM

## 2025-01-08 DIAGNOSIS — M51.26 HNP (HERNIATED NUCLEUS PULPOSUS), LUMBAR: ICD-10-CM

## 2025-01-08 DIAGNOSIS — M48.062 SPINAL STENOSIS OF LUMBAR REGION WITH NEUROGENIC CLAUDICATION: Primary | ICD-10-CM

## 2025-01-08 DIAGNOSIS — M47.9 SPONDYLOSIS: ICD-10-CM

## 2025-01-08 NOTE — TELEPHONE ENCOUNTER
Patient states she left the rehab center on her own with no recommendations- she has no mobility assistance- reviewed wound care, post op restrictions and brace- will place a urgent referral to home care to assess patient  Pt # [phone] or [phone]

## 2025-01-09 ENCOUNTER — TELEPHONE (OUTPATIENT)
Dept: NEUROSURGERY | Age: 70
End: 2025-01-09

## 2025-01-13 ENCOUNTER — TELEPHONE (OUTPATIENT)
Dept: NEUROSURGERY | Age: 70
End: 2025-01-13

## 2025-01-13 DIAGNOSIS — M48.062 NEUROGENIC CLAUDICATION DUE TO LUMBAR SPINAL STENOSIS: ICD-10-CM

## 2025-01-13 DIAGNOSIS — M48.062 SPINAL STENOSIS OF LUMBAR REGION WITH NEUROGENIC CLAUDICATION: Primary | ICD-10-CM

## 2025-01-13 DIAGNOSIS — M47.9 SPONDYLOSIS: ICD-10-CM

## 2025-01-13 DIAGNOSIS — M51.26 HNP (HERNIATED NUCLEUS PULPOSUS), LUMBAR: ICD-10-CM

## 2025-01-13 NOTE — TELEPHONE ENCOUNTER
Spoke to Tarun with [phone]- will address issues with patient pain control, seek out PCP for other medications and bracing  Informed to have patient to take 300 mg AM,PM and 600 mg HS  Right top of hand is swollen and numb- 1 st finger knuckle tender with a knot-advised on warm soaks to area TID; may alternate tylenol and Advil; also advised to touch base with her PCP for long medications

## 2025-01-15 ENCOUNTER — TELEPHONE (OUTPATIENT)
Dept: INTERNAL MEDICINE CLINIC | Facility: CLINIC | Age: 70
End: 2025-01-15

## 2025-01-15 NOTE — TELEPHONE ENCOUNTER
----- Message from Zuleykan T sent at 1/15/2025  2:41 PM EST -----  Regarding: ECC Escalation To Practice  ECC Escalation To Practice      Type of Escalation: Red Flag Symptoms  --------------------------------------------------------------------------------------------------------------------------    Information for Provider:  Patient is looking for appointment for: Symptom hand are swelling  Reasons for Message: Unable to reach practice     Additional Information PT been at the hospital and mention that they messed with her hand that's why ist's swelling right now  --------------------------------------------------------------------------------------------------------------------------    Relationship to Patient: Self     Call Back Info: OK to leave message on voicemail  Preferred Call Back Number: Phone [phone]

## 2025-01-16 ENCOUNTER — CARE COORDINATION (OUTPATIENT)
Dept: CARE COORDINATION | Facility: CLINIC | Age: 70
End: 2025-01-16

## 2025-01-16 ENCOUNTER — OFFICE VISIT (OUTPATIENT)
Dept: INTERNAL MEDICINE CLINIC | Facility: CLINIC | Age: 70
End: 2025-01-16
Payer: MEDICARE

## 2025-01-16 VITALS
SYSTOLIC BLOOD PRESSURE: 110 MMHG | HEIGHT: 63 IN | BODY MASS INDEX: 29.16 KG/M2 | HEART RATE: 67 BPM | DIASTOLIC BLOOD PRESSURE: 64 MMHG | OXYGEN SATURATION: 99 % | WEIGHT: 164.6 LBS

## 2025-01-16 DIAGNOSIS — M51.26 HERNIATION OF INTERVERTEBRAL DISC OF LUMBAR REGION: Primary | ICD-10-CM

## 2025-01-16 DIAGNOSIS — R20.2 RIGHT HAND PARESTHESIA: ICD-10-CM

## 2025-01-16 DIAGNOSIS — Z98.890 S/P LUMBAR LAMINECTOMY: ICD-10-CM

## 2025-01-16 DIAGNOSIS — M48.062 NEUROGENIC CLAUDICATION DUE TO LUMBAR SPINAL STENOSIS: ICD-10-CM

## 2025-01-16 DIAGNOSIS — K21.9 GASTROESOPHAGEAL REFLUX DISEASE WITHOUT ESOPHAGITIS: ICD-10-CM

## 2025-01-16 DIAGNOSIS — J30.1 NON-SEASONAL ALLERGIC RHINITIS DUE TO POLLEN: ICD-10-CM

## 2025-01-16 DIAGNOSIS — M47.9 SPONDYLOSIS: ICD-10-CM

## 2025-01-16 PROCEDURE — 1123F ACP DISCUSS/DSCN MKR DOCD: CPT | Performed by: INTERNAL MEDICINE

## 2025-01-16 PROCEDURE — G8417 CALC BMI ABV UP PARAM F/U: HCPCS | Performed by: INTERNAL MEDICINE

## 2025-01-16 PROCEDURE — G8427 DOCREV CUR MEDS BY ELIG CLIN: HCPCS | Performed by: INTERNAL MEDICINE

## 2025-01-16 PROCEDURE — 1159F MED LIST DOCD IN RCRD: CPT | Performed by: INTERNAL MEDICINE

## 2025-01-16 PROCEDURE — 1090F PRES/ABSN URINE INCON ASSESS: CPT | Performed by: INTERNAL MEDICINE

## 2025-01-16 PROCEDURE — 1125F AMNT PAIN NOTED PAIN PRSNT: CPT | Performed by: INTERNAL MEDICINE

## 2025-01-16 PROCEDURE — G8399 PT W/DXA RESULTS DOCUMENT: HCPCS | Performed by: INTERNAL MEDICINE

## 2025-01-16 PROCEDURE — 3074F SYST BP LT 130 MM HG: CPT | Performed by: INTERNAL MEDICINE

## 2025-01-16 PROCEDURE — 1160F RVW MEDS BY RX/DR IN RCRD: CPT | Performed by: INTERNAL MEDICINE

## 2025-01-16 PROCEDURE — 3078F DIAST BP <80 MM HG: CPT | Performed by: INTERNAL MEDICINE

## 2025-01-16 PROCEDURE — 1111F DSCHRG MED/CURRENT MED MERGE: CPT | Performed by: INTERNAL MEDICINE

## 2025-01-16 PROCEDURE — 4004F PT TOBACCO SCREEN RCVD TLK: CPT | Performed by: INTERNAL MEDICINE

## 2025-01-16 PROCEDURE — 99214 OFFICE O/P EST MOD 30 MIN: CPT | Performed by: INTERNAL MEDICINE

## 2025-01-16 PROCEDURE — 3017F COLORECTAL CA SCREEN DOC REV: CPT | Performed by: INTERNAL MEDICINE

## 2025-01-16 RX ORDER — FLUTICASONE PROPIONATE 50 MCG
1 SPRAY, SUSPENSION (ML) NASAL DAILY PRN
Qty: 16 G | Refills: 5 | Status: SHIPPED | OUTPATIENT
Start: 2025-01-16

## 2025-01-16 RX ORDER — TRAMADOL HYDROCHLORIDE 50 MG/1
50 TABLET ORAL EVERY 8 HOURS PRN
COMMUNITY
End: 2025-01-16 | Stop reason: SDUPTHER

## 2025-01-16 RX ORDER — FLUTICASONE PROPIONATE 50 MCG
1 SPRAY, SUSPENSION (ML) NASAL DAILY PRN
COMMUNITY
End: 2025-01-16 | Stop reason: SDUPTHER

## 2025-01-16 RX ORDER — ACETAMINOPHEN 500 MG
500 TABLET ORAL EVERY 6 HOURS PRN
COMMUNITY

## 2025-01-16 RX ORDER — TRAMADOL HYDROCHLORIDE 50 MG/1
50 TABLET ORAL EVERY 8 HOURS PRN
Qty: 90 TABLET | Refills: 0 | Status: SHIPPED | OUTPATIENT
Start: 2025-01-16 | End: 2025-02-15

## 2025-01-16 RX ORDER — METHOCARBAMOL 750 MG/1
750 TABLET, FILM COATED ORAL 4 TIMES DAILY
COMMUNITY
Start: 2025-01-08

## 2025-01-16 ASSESSMENT — ENCOUNTER SYMPTOMS
ABDOMINAL PAIN: 0
BLOOD IN STOOL: 0
SINUS PRESSURE: 0
NAUSEA: 0
VOMITING: 0
DIARRHEA: 0
COUGH: 0
SHORTNESS OF BREATH: 0
RHINORRHEA: 0
CHEST TIGHTNESS: 0

## 2025-01-16 NOTE — PROGRESS NOTES
General: No focal deficit present.      Mental Status: She is alert. Mental status is at baseline.      Sensory: Sensory deficit present.   Psychiatric:         Mood and Affect: Mood normal.         Thought Content: Thought content normal.           Assessment/Plan:    Bonny was seen today for hand pain, medication refill and belching.    Diagnoses and all orders for this visit:    Herniation of intervertebral disc of lumbar region  She had recent surgery and has had some improvement in symptoms, but still having pain;     Spondylosis  -     traMADol (ULTRAM) 50 MG tablet; Take 1 tablet by mouth every 8 hours as needed for Pain for up to 30 days. Max Daily Amount: 150 mg    Neurogenic claudication due to lumbar spinal stenosis  -     traMADol (ULTRAM) 50 MG tablet; Take 1 tablet by mouth every 8 hours as needed for Pain for up to 30 days. Max Daily Amount: 150 mg    S/P lumbar laminectomy  Hospitalization records, including labs, imaging studies, and consults were reviewed with patient today, along with treatment plan and follow-up recommendations.    Gastroesophageal reflux disease without esophagitis  Instructed to take medications as prescribed, and to call if no improvement in symptoms.     -     omeprazole (PRILOSEC) 20 MG delayed release capsule; Take 1 capsule by mouth every morning (before breakfast)    Right hand paresthesia  -     [facility]    Non-seasonal allergic rhinitis due to pollen  Stable on present medications, continue as prescribed.    -     fluticasone (FLONASE) 50 MCG/ACT nasal spray; 1 spray by Each Nostril route daily as needed for Rhinitis              Follow-up and Dispositions    Return in about 6 weeks (around 2/27/2025), or if symptoms worsen or fail to improve, for f/u.         Medication Reconciliation:  Current Medications Verified: Current medications/ immunizations were reviewed, including purpose, with patient.  Family History, Social History,

## 2025-01-16 NOTE — CARE COORDINATION
Care Transitions Post-Acute Facility Discharge Call    2025    Patient: Bonny Jensen Patient : 1955   MRN: [id number]  Reason for Admission: Neurogenic claudication due to lumbar spinal stenosis   Discharge Date: 25 RARS: Readmission Risk Score: 11.6      Patient Current Location:  Home: [address] [place] [address]  [facility] [address]    Care Transition Nurse contacted the patient by telephone to perform post hospital discharge assessment, verified name and  as identifiers. Provided introduction to self, and explanation of the Care Transition Nurse role.   Last Discharge Facility       Date Complaint Diagnosis Description Type Department Provider    24  Neurogenic claudication due to lumbar spinal stenosis ... Admission (Discharged) [id number] Lackey MD          Was this an external facility discharge? Yes. Discharge Date: 25. Facility Name: [facility]    Additional needs identified to be addressed with provider   No, patient has follow up with PCP this afternoon              Method of communication with provider: none.    Patients top risk factors for readmission: Neurogenic claudication due to lumbar spinal stenosis,     Interventions to address risk factors:   Reviewed discharge instructions and offered opportunity to ask any questions regarding discharge instructions.  Discussed the importance of medication adherence and management  Medication reconciliation done with patient, and patient is not too certain about her medications. CTN encouraged patient to bring medications for reconciliation at follow up this afternoon. Patient has follow up with PCP this afternoon, and her friend will assist with transportation.   Patient states she is using her cane and brace.   CTN encouraged safety precautions, and self-monitoring and when to seek care.  Patient reports [facility] staff is coming out to assist her with therapy at home.     Care Transition Nurse

## 2025-01-16 NOTE — CARE COORDINATION
Care Transitions Post-Acute Facility Update Call    2025    Patient: Bonny Jensen Patient : 1955   MRN: [id number]  Reason for Admission: elective/planned surgery: L2-L5 laminectomy for decompression, and an L2-3 discectomy   Discharge Date: 25 RARS: Readmission Risk Score: 11.6    Per chart review patient left [facility] on her own without recommendations on 25. Neurosurgery initiated HH.  Will notify CTN for transitions of care.

## 2025-01-20 ENCOUNTER — TELEPHONE (OUTPATIENT)
Dept: NEUROSURGERY | Age: 70
End: 2025-01-20

## 2025-01-21 NOTE — TELEPHONE ENCOUNTER
Unable to connect with the patient so I called the pt again today 1/21/25 and she has been seen and taken care of

## 2025-01-22 ENCOUNTER — TELEPHONE (OUTPATIENT)
Dept: NEUROSURGERY | Age: 70
End: 2025-01-22

## 2025-01-22 NOTE — TELEPHONE ENCOUNTER
Vm left for incision to remain open to air, may shower and get wet but not to saturate- pat dry and air dry- the brace needs to remain on when above 30 degrees- may pad areas of pressure from brace with a soft washcloth

## 2025-01-22 NOTE — TELEPHONE ENCOUNTER
Patient called, the dressing over the incision has come off. What does she need to do?    Now about the brace.  If she is sitting, she has to take her hand, put it between the brace & her body to be comfortable or eat.     [phone] is her 2nd #    Would like to be advised on this.    Thank you!

## 2025-01-30 ENCOUNTER — OFFICE VISIT (OUTPATIENT)
Dept: NEUROSURGERY | Age: 70
End: 2025-01-30

## 2025-01-30 VITALS
HEIGHT: 63 IN | WEIGHT: 164 LBS | OXYGEN SATURATION: 98 % | BODY MASS INDEX: 29.06 KG/M2 | HEART RATE: 63 BPM | TEMPERATURE: 97.2 F | SYSTOLIC BLOOD PRESSURE: 125 MMHG | DIASTOLIC BLOOD PRESSURE: 61 MMHG

## 2025-01-30 DIAGNOSIS — M48.062 SPINAL STENOSIS OF LUMBAR REGION WITH NEUROGENIC CLAUDICATION: Primary | ICD-10-CM

## 2025-01-30 DIAGNOSIS — M47.9 SPONDYLOSIS: ICD-10-CM

## 2025-01-30 PROCEDURE — 99024 POSTOP FOLLOW-UP VISIT: CPT | Performed by: STUDENT IN AN ORGANIZED HEALTH CARE EDUCATION/TRAINING PROGRAM

## 2025-01-30 NOTE — PROGRESS NOTES
[facility]  Neurosurgery Clinic Note         Patient Name: Bonny Jensen    Medical Record Number: [id number]    YOB: 1955    Date of Visit: 02/01/25         Visit Type: New Patient         CHIEF COMPLAINT:    Chief Complaint   Patient presents with    Post-Op Check       HISTORY OF PRESENT ILLNESS:    I had the pleasure of meeting Bonny Jensen in the neurosurgical clinic today. Bonny Jensen is a pleasant 69 y.o. year young female who presents for a 1 month postop visit after an L2-L5 laminectomy and decompression.  Patient reports hip postoperative pain has slightly improved however she is still having some significant back pain.  She is otherwise progressing slowly.  She reports given her postoperative pain she is hard to tell if her preoperative pain has improved.        Physical Examination    /61   Pulse 63   Temp 97.2 °F (36.2 °C) (Temporal)   Ht 1.6 m (5' 3\")   Wt 74.4 kg (164 lb)   LMP  (LMP Unknown)   SpO2 98%   BMI 29.05 kg/m²          Physical Exam         Lower Extremity Right Left   Illiopsoas (L2,3) 5 5   Quadriceps (L3,4) 5 5   Dorsiflexion (L4,5) 5 5   EHL (L5,S1) 5 5   Gastrocnemius (S1,2) 5 5     Reflex Level Grade   Knee jerk L3-4 R: 2+  L:2+   Ankle jerk S1 R: 2+  L:2+          Incision: Well-healed and intact         IMAGES: None             Diagnosis Orders   1. Spinal stenosis of lumbar region with neurogenic claudication  XR LUMBAR SPINE FLEXION AND EXTENSION ONLY      2. Spondylosis  XR LUMBAR SPINE FLEXION AND EXTENSION ONLY            ASSESSMENT and PLAN:    Bonny Jensen is a pleasant 69 y.o. year young female who presents for a 1 month postop visit after an L2-L5 laminectomy and decompression.    Patient seems to be slowly progressing towards the right direction.  I encouraged patient to continue continue walking and working with PT.  Patient to reach out to my office with any questions.  At this point patient can

## 2025-02-04 ENCOUNTER — OFFICE VISIT (OUTPATIENT)
Age: 70
End: 2025-02-04
Payer: MEDICARE

## 2025-02-04 ENCOUNTER — CARE COORDINATION (OUTPATIENT)
Dept: CARE COORDINATION | Facility: CLINIC | Age: 70
End: 2025-02-04

## 2025-02-04 VITALS — WEIGHT: 161 LBS | HEIGHT: 63 IN | BODY MASS INDEX: 28.53 KG/M2

## 2025-02-04 DIAGNOSIS — M79.641 RIGHT HAND PAIN: Primary | ICD-10-CM

## 2025-02-04 PROCEDURE — G8417 CALC BMI ABV UP PARAM F/U: HCPCS | Performed by: ORTHOPAEDIC SURGERY

## 2025-02-04 PROCEDURE — 1123F ACP DISCUSS/DSCN MKR DOCD: CPT | Performed by: ORTHOPAEDIC SURGERY

## 2025-02-04 PROCEDURE — 3017F COLORECTAL CA SCREEN DOC REV: CPT | Performed by: ORTHOPAEDIC SURGERY

## 2025-02-04 PROCEDURE — 4004F PT TOBACCO SCREEN RCVD TLK: CPT | Performed by: ORTHOPAEDIC SURGERY

## 2025-02-04 PROCEDURE — 99203 OFFICE O/P NEW LOW 30 MIN: CPT | Performed by: ORTHOPAEDIC SURGERY

## 2025-02-04 PROCEDURE — G8428 CUR MEDS NOT DOCUMENT: HCPCS | Performed by: ORTHOPAEDIC SURGERY

## 2025-02-04 PROCEDURE — 1090F PRES/ABSN URINE INCON ASSESS: CPT | Performed by: ORTHOPAEDIC SURGERY

## 2025-02-04 PROCEDURE — G8399 PT W/DXA RESULTS DOCUMENT: HCPCS | Performed by: ORTHOPAEDIC SURGERY

## 2025-02-04 NOTE — CARE COORDINATION
Care Transitions Note    Final Call     Patient Current Location:  [address]  [facility] [address]    Care Transition Nurse contacted the patient by telephone. Verified name and  as identifiers.    Patient graduated from the Care Transitions program on 2025.  Patient/family has the ability to self manage at this time.    Handoff:   Patient was not referred to the ACM team due to no additional needs identified.     Patient completed follow up with PCP and Post op with Dr. Maddox. Patient states she is doing well. Patient is able to manage her care.     Assessments:  Care Transitions Subsequent and Final Call    Subsequent and Final Calls  Care Transitions Interventions  Other Interventions:          Upcoming Appointments:    Future Appointments         Provider Specialty Dept Phone    2025 1:40 PM (Arrive by 1:25 PM) Juana Pan MD Orthopedic Surgery [phone]    2025 9:40 AM Rhianna Anne MD Internal Medicine [phone]    2025 2:00 PM Fito Maddox MD Neurosurgery [phone]    2025 10:00 AM [facility] Internal Medicine [phone]    2025 2:00 PM Rhianna Anne MD Internal Medicine [phone]        Patient has agreed to contact primary care provider and/or specialist for any further questions, concerns, or needs.    Keshia Becker RN

## 2025-02-05 NOTE — PROGRESS NOTES
Orthopaedic Hand Clinic Note    Name: oBnny Jensen  YOB: 1955  Gender: female  MRN: [id number]      CC: Patient referred for evaluation of upper extremity pain    HPI: Bonny Jensen is a 69 y.o. female with a chief complaint of right hand pain after having IV's placed in December for lumbar spine surgery. She says she had lumps on the hand and forearm, which have resolved. She continues to have swelling, tingling and pain on the back of the hand. She says she saw fluid on the skin surface around where the IV was placed.  .      ROS/Meds/PSH/PMH/FH/SH: I personally reviewed the patients standard intake form.  Pertinents are discussed in the HPI    Physical Examination:    Musculoskeletal Exam:  Examination on the right upper extremity demonstrates cap refill < 5 seconds in all fingers, there is mild soft tissue swelling over the dorsum of the hand. She has tenderness to palpation diffusely over the dorsum of the hand. No masses are palpated. She is able to fully flex and extend all digits and make a composite fist to the distal palmar crease. Light touch sensation is subjectively diminished over the dorsum of the hand.    Imaging / Electrodiagnostic Tests:     Hand XR: AP, Lateral, Oblique     Clinical Indication:  1. Right hand pain           Report: AP, lateral, and oblique x-ray of the right hand demonstrates no bony abnormalities; joint spaces are well preserved    Impression: as above     Juana Pan MD         Assessment:     ICD-10-CM    1. Right hand pain  M79.641 XR HAND RIGHT (MIN 3 VIEWS)          Plan:   We discussed the diagnosis and different treatment options. The patient may have had a hematoma or potentially infiltration of an IV during her hospitalization. There is no mass present at this time, and no evidence of tendon injury. Hypersensitivity over the dorsum of the hand and swelling are expected to resolve with time. We discussed observation, therapy,

## 2025-02-08 DIAGNOSIS — K21.9 GASTROESOPHAGEAL REFLUX DISEASE WITHOUT ESOPHAGITIS: ICD-10-CM

## 2025-02-10 RX ORDER — OMEPRAZOLE 20 MG/1
20 CAPSULE, DELAYED RELEASE ORAL
Qty: 90 CAPSULE | Refills: 1 | OUTPATIENT
Start: 2025-02-10

## 2025-03-04 ENCOUNTER — OFFICE VISIT (OUTPATIENT)
Dept: INTERNAL MEDICINE CLINIC | Facility: CLINIC | Age: 70
End: 2025-03-04
Payer: MEDICARE

## 2025-03-04 ENCOUNTER — LAB (OUTPATIENT)
Dept: INTERNAL MEDICINE CLINIC | Facility: CLINIC | Age: 70
End: 2025-03-04

## 2025-03-04 VITALS
RESPIRATION RATE: 17 BRPM | WEIGHT: 164.2 LBS | HEIGHT: 63 IN | SYSTOLIC BLOOD PRESSURE: 110 MMHG | DIASTOLIC BLOOD PRESSURE: 64 MMHG | OXYGEN SATURATION: 98 % | HEART RATE: 59 BPM | BODY MASS INDEX: 29.09 KG/M2 | TEMPERATURE: 97.3 F

## 2025-03-04 DIAGNOSIS — I10 PRIMARY HYPERTENSION: ICD-10-CM

## 2025-03-04 DIAGNOSIS — E78.00 HYPERCHOLESTEREMIA: Primary | ICD-10-CM

## 2025-03-04 DIAGNOSIS — Z00.00 MEDICARE ANNUAL WELLNESS VISIT, SUBSEQUENT: ICD-10-CM

## 2025-03-04 DIAGNOSIS — Z11.59 ENCOUNTER FOR HEPATITIS C SCREENING TEST FOR LOW RISK PATIENT: ICD-10-CM

## 2025-03-04 DIAGNOSIS — E55.9 VITAMIN D DEFICIENCY: ICD-10-CM

## 2025-03-04 DIAGNOSIS — R73.03 PREDIABETES: ICD-10-CM

## 2025-03-04 DIAGNOSIS — M48.062 NEUROGENIC CLAUDICATION DUE TO LUMBAR SPINAL STENOSIS: ICD-10-CM

## 2025-03-04 DIAGNOSIS — E78.00 HYPERCHOLESTEREMIA: ICD-10-CM

## 2025-03-04 DIAGNOSIS — M47.9 SPONDYLOSIS: ICD-10-CM

## 2025-03-04 LAB
25(OH)D3 SERPL-MCNC: 16.7 NG/ML (ref 30–100)
ALBUMIN SERPL-MCNC: 3.9 G/DL (ref 3.2–4.6)
ALBUMIN/GLOB SERPL: 1.4 (ref 1–1.9)
ALP SERPL-CCNC: 134 U/L (ref 35–104)
ALT SERPL-CCNC: 18 U/L (ref 8–45)
ANION GAP SERPL CALC-SCNC: 10 MMOL/L (ref 7–16)
APPEARANCE UR: ABNORMAL
AST SERPL-CCNC: 19 U/L (ref 15–37)
BACTERIA URNS QL MICRO: ABNORMAL /HPF
BASOPHILS # BLD: 0.05 K/UL (ref 0–0.2)
BASOPHILS NFR BLD: 0.5 % (ref 0–2)
BILIRUB SERPL-MCNC: <0.2 MG/DL (ref 0–1.2)
BILIRUB UR QL: NEGATIVE
BUN SERPL-MCNC: 13 MG/DL (ref 8–23)
CALCIUM SERPL-MCNC: 9.4 MG/DL (ref 8.8–10.2)
CHLORIDE SERPL-SCNC: 106 MMOL/L (ref 98–107)
CHOLEST SERPL-MCNC: 154 MG/DL (ref 0–200)
CO2 SERPL-SCNC: 27 MMOL/L (ref 20–29)
COLOR UR: ABNORMAL
CREAT SERPL-MCNC: 0.74 MG/DL (ref 0.6–1.1)
DIFFERENTIAL METHOD BLD: ABNORMAL
EOSINOPHIL # BLD: 0.11 K/UL (ref 0–0.8)
EOSINOPHIL NFR BLD: 1.1 % (ref 0.5–7.8)
EPI CELLS #/AREA URNS HPF: ABNORMAL /HPF (ref 0–5)
ERYTHROCYTE [DISTWIDTH] IN BLOOD BY AUTOMATED COUNT: 13.4 % (ref 11.9–14.6)
EST. AVERAGE GLUCOSE BLD GHB EST-MCNC: 148 MG/DL
GLOBULIN SER CALC-MCNC: 2.9 G/DL (ref 2.3–3.5)
GLUCOSE SERPL-MCNC: 95 MG/DL (ref 70–99)
GLUCOSE UR STRIP.AUTO-MCNC: NEGATIVE MG/DL
HBA1C MFR BLD: 6.8 % (ref 0–5.6)
HCT VFR BLD AUTO: 38.5 % (ref 35.8–46.3)
HCV AB SER QL: NONREACTIVE
HDLC SERPL-MCNC: 34 MG/DL (ref 40–60)
HDLC SERPL: 4.6 (ref 0–5)
HGB BLD-MCNC: 12.3 G/DL (ref 11.7–15.4)
HGB UR QL STRIP: NEGATIVE
HYALINE CASTS URNS QL MICRO: ABNORMAL /LPF
IMM GRANULOCYTES # BLD AUTO: 0.08 K/UL (ref 0–0.5)
IMM GRANULOCYTES NFR BLD AUTO: 0.8 % (ref 0–5)
KETONES UR QL STRIP.AUTO: NEGATIVE MG/DL
LDLC SERPL CALC-MCNC: 79 MG/DL (ref 0–100)
LEUKOCYTE ESTERASE UR QL STRIP.AUTO: ABNORMAL
LYMPHOCYTES # BLD: 3.8 K/UL (ref 0.5–4.6)
LYMPHOCYTES NFR BLD: 38.3 % (ref 13–44)
MCH RBC QN AUTO: 30.4 PG (ref 26.1–32.9)
MCHC RBC AUTO-ENTMCNC: 31.9 G/DL (ref 31.4–35)
MCV RBC AUTO: 95.3 FL (ref 82–102)
MONOCYTES # BLD: 0.62 K/UL (ref 0.1–1.3)
MONOCYTES NFR BLD: 6.3 % (ref 4–12)
NEUTS SEG # BLD: 5.25 K/UL (ref 1.7–8.2)
NEUTS SEG NFR BLD: 53 % (ref 43–78)
NITRITE UR QL STRIP.AUTO: NEGATIVE
NRBC # BLD: 0 K/UL (ref 0–0.2)
PH UR STRIP: 5.5 (ref 5–9)
PLATELET # BLD AUTO: 335 K/UL (ref 150–450)
PMV BLD AUTO: 10 FL (ref 9.4–12.3)
POTASSIUM SERPL-SCNC: 4.2 MMOL/L (ref 3.5–5.1)
PROT SERPL-MCNC: 6.8 G/DL (ref 6.3–8.2)
PROT UR STRIP-MCNC: ABNORMAL MG/DL
RBC # BLD AUTO: 4.04 M/UL (ref 4.05–5.2)
RBC #/AREA URNS HPF: ABNORMAL /HPF (ref 0–5)
SODIUM SERPL-SCNC: 143 MMOL/L (ref 136–145)
SP GR UR REFRACTOMETRY: 1.03 (ref 1–1.02)
T4 FREE SERPL-MCNC: 1.1 NG/DL (ref 0.9–1.7)
TRIGL SERPL-MCNC: 205 MG/DL (ref 0–150)
TSH, 3RD GENERATION: 2.31 UIU/ML (ref 0.27–4.2)
UROBILINOGEN UR QL STRIP.AUTO: 1 EU/DL (ref 0.2–1)
VLDLC SERPL CALC-MCNC: 41 MG/DL (ref 6–23)
WBC # BLD AUTO: 9.9 K/UL (ref 4.3–11.1)
WBC URNS QL MICRO: ABNORMAL /HPF (ref 0–4)

## 2025-03-04 PROCEDURE — 3074F SYST BP LT 130 MM HG: CPT | Performed by: INTERNAL MEDICINE

## 2025-03-04 PROCEDURE — 4004F PT TOBACCO SCREEN RCVD TLK: CPT | Performed by: INTERNAL MEDICINE

## 2025-03-04 PROCEDURE — G8417 CALC BMI ABV UP PARAM F/U: HCPCS | Performed by: INTERNAL MEDICINE

## 2025-03-04 PROCEDURE — G8427 DOCREV CUR MEDS BY ELIG CLIN: HCPCS | Performed by: INTERNAL MEDICINE

## 2025-03-04 PROCEDURE — 1090F PRES/ABSN URINE INCON ASSESS: CPT | Performed by: INTERNAL MEDICINE

## 2025-03-04 PROCEDURE — 1160F RVW MEDS BY RX/DR IN RCRD: CPT | Performed by: INTERNAL MEDICINE

## 2025-03-04 PROCEDURE — 1125F AMNT PAIN NOTED PAIN PRSNT: CPT | Performed by: INTERNAL MEDICINE

## 2025-03-04 PROCEDURE — 1159F MED LIST DOCD IN RCRD: CPT | Performed by: INTERNAL MEDICINE

## 2025-03-04 PROCEDURE — 3078F DIAST BP <80 MM HG: CPT | Performed by: INTERNAL MEDICINE

## 2025-03-04 PROCEDURE — 99214 OFFICE O/P EST MOD 30 MIN: CPT | Performed by: INTERNAL MEDICINE

## 2025-03-04 PROCEDURE — G8399 PT W/DXA RESULTS DOCUMENT: HCPCS | Performed by: INTERNAL MEDICINE

## 2025-03-04 PROCEDURE — 1123F ACP DISCUSS/DSCN MKR DOCD: CPT | Performed by: INTERNAL MEDICINE

## 2025-03-04 PROCEDURE — 3017F COLORECTAL CA SCREEN DOC REV: CPT | Performed by: INTERNAL MEDICINE

## 2025-03-04 RX ORDER — METHOCARBAMOL 750 MG/1
750 TABLET, FILM COATED ORAL 3 TIMES DAILY PRN
Qty: 90 TABLET | Refills: 1 | Status: SHIPPED | OUTPATIENT
Start: 2025-03-04

## 2025-03-04 RX ORDER — TRAMADOL HYDROCHLORIDE 50 MG/1
50 TABLET ORAL EVERY 8 HOURS PRN
Qty: 90 TABLET | Refills: 0 | Status: SHIPPED | OUTPATIENT
Start: 2025-03-04 | End: 2025-04-03

## 2025-03-04 ASSESSMENT — ENCOUNTER SYMPTOMS
RHINORRHEA: 0
SINUS PRESSURE: 0
DIARRHEA: 0
VOMITING: 0
COUGH: 0
NAUSEA: 0
ABDOMINAL PAIN: 0
BLOOD IN STOOL: 0
SHORTNESS OF BREATH: 0
CHEST TIGHTNESS: 0

## 2025-03-04 NOTE — PROGRESS NOTES
improve, for medicare annual w/ labs.         Medication Reconciliation:  Current Medications Verified: Current medications/ immunizations were reviewed, including purpose, with patient.  Family History, Social History, Current and Past Medical History was reviewed with patient and updated at today's office visit.  Medication Reconciliation list was given to patient/ family.  Patient was advised to discard old medication lists and provide all providers with current list at each visit and carry list with them in case of emergency.    Completed By:   Rhianna Anne MD    [facility]  [address] [place] [address], [address] [address]  [phone] fax  12:16 PM

## 2025-03-13 ENCOUNTER — TELEPHONE (OUTPATIENT)
Dept: DIABETES SERVICES | Age: 70
End: 2025-03-13

## 2025-03-13 ENCOUNTER — OFFICE VISIT (OUTPATIENT)
Dept: INTERNAL MEDICINE CLINIC | Facility: CLINIC | Age: 70
End: 2025-03-13

## 2025-03-13 VITALS
WEIGHT: 164.6 LBS | RESPIRATION RATE: 15 BRPM | SYSTOLIC BLOOD PRESSURE: 128 MMHG | HEIGHT: 63 IN | BODY MASS INDEX: 29.16 KG/M2 | HEART RATE: 59 BPM | DIASTOLIC BLOOD PRESSURE: 68 MMHG | OXYGEN SATURATION: 98 % | TEMPERATURE: 97.2 F

## 2025-03-13 DIAGNOSIS — J43.8 OTHER EMPHYSEMA (HCC): ICD-10-CM

## 2025-03-13 DIAGNOSIS — M54.42 CHRONIC LEFT-SIDED LOW BACK PAIN WITH LEFT-SIDED SCIATICA: ICD-10-CM

## 2025-03-13 DIAGNOSIS — G89.29 CHRONIC LEFT-SIDED LOW BACK PAIN WITH LEFT-SIDED SCIATICA: ICD-10-CM

## 2025-03-13 DIAGNOSIS — K21.9 GASTROESOPHAGEAL REFLUX DISEASE WITHOUT ESOPHAGITIS: ICD-10-CM

## 2025-03-13 DIAGNOSIS — R82.90 ABNORMAL URINALYSIS: ICD-10-CM

## 2025-03-13 DIAGNOSIS — E11.9 TYPE 2 DIABETES MELLITUS WITHOUT COMPLICATION, WITHOUT LONG-TERM CURRENT USE OF INSULIN (HCC): Primary | ICD-10-CM

## 2025-03-13 DIAGNOSIS — E78.00 HYPERCHOLESTEREMIA: ICD-10-CM

## 2025-03-13 DIAGNOSIS — R39.15 URINARY URGENCY: ICD-10-CM

## 2025-03-13 DIAGNOSIS — I10 PRIMARY HYPERTENSION: ICD-10-CM

## 2025-03-13 DIAGNOSIS — E55.9 VITAMIN D DEFICIENCY: ICD-10-CM

## 2025-03-13 RX ORDER — GABAPENTIN 300 MG/1
300 CAPSULE ORAL NIGHTLY
Qty: 90 CAPSULE | Refills: 1 | Status: CANCELLED | OUTPATIENT
Start: 2025-03-13 | End: 2025-09-09

## 2025-03-13 RX ORDER — LISINOPRIL 40 MG/1
40 TABLET ORAL DAILY
Qty: 90 TABLET | Refills: 1 | Status: CANCELLED | OUTPATIENT
Start: 2025-03-13

## 2025-03-13 RX ORDER — GABAPENTIN 300 MG/1
300 CAPSULE ORAL 3 TIMES DAILY
Qty: 270 CAPSULE | Refills: 0 | Status: SHIPPED | OUTPATIENT
Start: 2025-03-13 | End: 2025-06-11

## 2025-03-13 RX ORDER — TRAMADOL HYDROCHLORIDE 50 MG/1
50 TABLET ORAL EVERY 8 HOURS PRN
Qty: 90 TABLET | Refills: 0 | Status: CANCELLED | OUTPATIENT
Start: 2025-03-13 | End: 2025-04-12

## 2025-03-13 RX ORDER — FLUTICASONE PROPIONATE AND SALMETEROL 250; 50 UG/1; UG/1
POWDER RESPIRATORY (INHALATION)
Qty: 1 EACH | Refills: 5 | Status: SHIPPED | OUTPATIENT
Start: 2025-03-13

## 2025-03-13 RX ORDER — METFORMIN HYDROCHLORIDE 500 MG/1
500 TABLET, EXTENDED RELEASE ORAL
Qty: 90 TABLET | Refills: 1 | Status: SHIPPED | OUTPATIENT
Start: 2025-03-13

## 2025-03-13 RX ORDER — FLUTICASONE PROPIONATE AND SALMETEROL 250; 50 UG/1; UG/1
POWDER RESPIRATORY (INHALATION)
Qty: 1 EACH | Refills: 5 | Status: CANCELLED | OUTPATIENT
Start: 2025-03-13

## 2025-03-13 RX ORDER — CARVEDILOL 12.5 MG/1
12.5 TABLET ORAL 2 TIMES DAILY
Qty: 180 TABLET | Refills: 1 | Status: SHIPPED | OUTPATIENT
Start: 2025-03-13

## 2025-03-13 RX ORDER — AVOBENZONE, HOMOSALATE, OCTISALATE, OCTOCRYLENE 30; 40; 45; 26 MG/ML; MG/ML; MG/ML; MG/ML
CREAM TOPICAL
Qty: 100 EACH | Refills: 3 | Status: SHIPPED | OUTPATIENT
Start: 2025-03-13 | End: 2025-03-14

## 2025-03-13 RX ORDER — LISINOPRIL 40 MG/1
40 TABLET ORAL DAILY
Qty: 90 TABLET | Refills: 1 | Status: SHIPPED | OUTPATIENT
Start: 2025-03-13

## 2025-03-13 RX ORDER — OMEPRAZOLE 20 MG/1
20 CAPSULE, DELAYED RELEASE ORAL
Qty: 30 CAPSULE | Refills: 1 | Status: CANCELLED | OUTPATIENT
Start: 2025-03-13

## 2025-03-13 RX ORDER — ERGOCALCIFEROL 1.25 MG/1
50000 CAPSULE, LIQUID FILLED ORAL WEEKLY
Qty: 12 CAPSULE | Refills: 0 | Status: SHIPPED | OUTPATIENT
Start: 2025-03-13

## 2025-03-13 RX ORDER — FEXOFENADINE HCL 180 MG/1
180 TABLET ORAL DAILY
Qty: 90 TABLET | Refills: 3 | Status: CANCELLED | OUTPATIENT
Start: 2025-03-13

## 2025-03-13 RX ORDER — OMEPRAZOLE 20 MG/1
20 CAPSULE, DELAYED RELEASE ORAL
Qty: 90 CAPSULE | Refills: 1 | Status: SHIPPED | OUTPATIENT
Start: 2025-03-13

## 2025-03-13 RX ORDER — GLUCOSAMINE HCL/CHONDROITIN SU 500-400 MG
CAPSULE ORAL
Qty: 100 STRIP | Refills: 3 | Status: SHIPPED | OUTPATIENT
Start: 2025-03-13

## 2025-03-13 RX ORDER — CARVEDILOL 12.5 MG/1
12.5 TABLET ORAL 2 TIMES DAILY
Qty: 180 TABLET | Refills: 1 | Status: CANCELLED | OUTPATIENT
Start: 2025-03-13

## 2025-03-13 RX ORDER — BLOOD-GLUCOSE METER
EACH MISCELLANEOUS
Qty: 1 KIT | Refills: 0 | Status: SHIPPED | OUTPATIENT
Start: 2025-03-13

## 2025-03-13 ASSESSMENT — ENCOUNTER SYMPTOMS
ABDOMINAL PAIN: 0
SORE THROAT: 0
VOMITING: 0
NAUSEA: 0
WHEEZING: 0
COUGH: 0
SHORTNESS OF BREATH: 0

## 2025-03-13 NOTE — TELEPHONE ENCOUNTER
Type 2  Called about free Diabetes education.  She scheduled both Living and Coping virtual for April 14, 2025 and wants Nutrition too.  [phone] or [phone]

## 2025-03-13 NOTE — PROGRESS NOTES
EP catheter inserted at the coronary sinus.
Patient has appointment with you to review labs; she has not had fasting labs in over 2 years, which is why I did not fill any meds when I saw her earlier this month; looks like her A1c is in the diabetic range, and I know you will review this and DM2, etc at her visit with you today;   
swelling.   Gastrointestinal:  Negative for abdominal pain, nausea and vomiting.   Genitourinary:  Positive for frequency and urgency. Negative for dysuria and hematuria.   Neurological:  Negative for headaches.       Objective:  /68 (BP Site: Left Upper Arm, Patient Position: Sitting, BP Cuff Size: Medium Adult)   Pulse 59   Temp 97.2 °F (36.2 °C) (Temporal)   Resp 15   Ht 1.6 m (5' 3\")   Wt 74.7 kg (164 lb 9.6 oz)   LMP  (LMP Unknown)   SpO2 98%   BMI 29.16 kg/m²       Examination:  Physical Exam  Vitals and nursing note reviewed.   Constitutional:       General: She is not in acute distress.     Appearance: Normal appearance.   Cardiovascular:      Rate and Rhythm: Normal rate and regular rhythm.   Pulmonary:      Effort: Pulmonary effort is normal. No respiratory distress.      Breath sounds: Normal breath sounds.   Abdominal:      General: Bowel sounds are normal.      Palpations: Abdomen is soft.      Tenderness: There is no abdominal tenderness.   Musculoskeletal:      Right lower leg: No edema.      Left lower leg: No edema.   Skin:     General: Skin is warm and dry.   Neurological:      Mental Status: She is alert and oriented to person, place, and time.   Psychiatric:         Mood and Affect: Mood normal.             Lab Results   Component Value Date/Time    WBC 9.9 03/04/2025 12:17 PM    HGB 12.3 03/04/2025 12:17 PM    HCT 38.5 03/04/2025 12:17 PM    MCV 95.3 03/04/2025 12:17 PM    RDW 13.4 03/04/2025 12:17 PM     03/04/2025 12:17 PM    NEUTOPHILPCT 53.0 03/04/2025 12:17 PM    LYMPHOPCT 38.3 03/04/2025 12:17 PM    MONOPCT 6.3 03/04/2025 12:17 PM    BASOPCT 0.5 03/04/2025 12:17 PM    LYMPHSABS 3.80 03/04/2025 12:17 PM    MONOSABS 0.62 03/04/2025 12:17 PM    EOSABS 0.11 03/04/2025 12:17 PM    BASOSABS 0.05 03/04/2025 12:17 PM    IMMGRAN 0.8 03/04/2025 12:17 PM    GRANULOCYTEABSOLUTECOUNT 0.2 04/13/2022 06:40 PM       Lab Results   Component Value Date/Time     03/04/2025 12:17 PM

## 2025-03-14 DIAGNOSIS — E11.9 TYPE 2 DIABETES MELLITUS WITHOUT COMPLICATION, WITHOUT LONG-TERM CURRENT USE OF INSULIN (HCC): Primary | ICD-10-CM

## 2025-03-14 RX ORDER — LANCETS
EACH MISCELLANEOUS
Qty: 100 EACH | Refills: 3 | Status: SHIPPED | OUTPATIENT
Start: 2025-03-14

## 2025-03-15 LAB
BACTERIA SPEC CULT: NORMAL
SERVICE CMNT-IMP: NORMAL

## 2025-03-17 ENCOUNTER — RESULTS FOLLOW-UP (OUTPATIENT)
Dept: INTERNAL MEDICINE CLINIC | Facility: CLINIC | Age: 70
End: 2025-03-17

## 2025-03-17 ENCOUNTER — TELEPHONE (OUTPATIENT)
Dept: INTERNAL MEDICINE CLINIC | Facility: CLINIC | Age: 70
End: 2025-03-17

## 2025-03-17 NOTE — TELEPHONE ENCOUNTER
Pt called, states that she was told by pharmacy that they were out of stock of her lancets, and didn't have Rx for blood glucose strips or glucose meter. Pt asks that these be re-sent. Current pharmacy: [facility] [address]

## 2025-03-24 ENCOUNTER — TELEPHONE (OUTPATIENT)
Dept: INTERNAL MEDICINE CLINIC | Facility: CLINIC | Age: 70
End: 2025-03-24

## 2025-03-24 DIAGNOSIS — E11.9 TYPE 2 DIABETES MELLITUS WITHOUT COMPLICATION, WITHOUT LONG-TERM CURRENT USE OF INSULIN: ICD-10-CM

## 2025-03-24 RX ORDER — GLUCOSAMINE HCL/CHONDROITIN SU 500-400 MG
CAPSULE ORAL
Qty: 100 STRIP | Refills: 3 | Status: SHIPPED | OUTPATIENT
Start: 2025-03-24

## 2025-03-24 RX ORDER — BLOOD-GLUCOSE METER
EACH MISCELLANEOUS
Qty: 1 KIT | Refills: 0 | Status: SHIPPED | OUTPATIENT
Start: 2025-03-24 | End: 2025-03-28 | Stop reason: SDUPTHER

## 2025-03-24 NOTE — TELEPHONE ENCOUNTER
Have patient contact insurance and ask which brand of glucometer and supplies is preferred on their plan and to let us know;

## 2025-03-24 NOTE — TELEPHONE ENCOUNTER
[facility] pharmacy on [address] called saying RX for blood glucose monitoring supplies can not be filled with pt insurance. [facility] said it needs to specify a brand for insurance to fill rx. [facility] call back number [phone].

## 2025-03-25 NOTE — TELEPHONE ENCOUNTER
I called pt at [phone]  Per MD Anne  Have patient contact insurance and ask which brand of glucometer and supplies is preferred on their plan and to let us know;     Pt voiced understanding. Pt states that she stopped taking metformin because she thinks it is making her dizzy.

## 2025-03-28 RX ORDER — AVOBENZONE, HOMOSALATE, OCTISALATE, OCTOCRYLENE 30; 40; 45; 26 MG/ML; MG/ML; MG/ML; MG/ML
CREAM TOPICAL
Qty: 100 EACH | Refills: 3 | Status: SHIPPED | OUTPATIENT
Start: 2025-03-28

## 2025-03-28 RX ORDER — BLOOD-GLUCOSE METER
EACH MISCELLANEOUS
Qty: 1 KIT | Refills: 0 | Status: SHIPPED | OUTPATIENT
Start: 2025-03-28 | End: 2025-03-31 | Stop reason: SDUPTHER

## 2025-03-28 RX ORDER — GLUCOSAMINE HCL/CHONDROITIN SU 500-400 MG
CAPSULE ORAL
Qty: 100 STRIP | Refills: 3 | Status: SHIPPED | OUTPATIENT
Start: 2025-03-28 | End: 2025-03-31 | Stop reason: SDUPTHER

## 2025-03-28 NOTE — TELEPHONE ENCOUNTER
Pt states she was told by pharmacy that the glucometer needs to be:    Gold 365 Contour Next Blue Meter Kit

## 2025-03-31 ENCOUNTER — TELEMEDICINE (OUTPATIENT)
Dept: INTERNAL MEDICINE CLINIC | Facility: CLINIC | Age: 70
End: 2025-03-31

## 2025-03-31 DIAGNOSIS — R42 VERTIGO: Primary | ICD-10-CM

## 2025-03-31 DIAGNOSIS — E11.9 TYPE 2 DIABETES MELLITUS WITHOUT COMPLICATION, WITHOUT LONG-TERM CURRENT USE OF INSULIN: ICD-10-CM

## 2025-03-31 RX ORDER — MECLIZINE HCL 12.5 MG 12.5 MG/1
12.5 TABLET ORAL 3 TIMES DAILY PRN
Qty: 30 TABLET | Refills: 0 | Status: SHIPPED | OUTPATIENT
Start: 2025-03-31 | End: 2025-04-10

## 2025-03-31 RX ORDER — GLUCOSAMINE HCL/CHONDROITIN SU 500-400 MG
CAPSULE ORAL
Qty: 100 STRIP | Refills: 3 | Status: SHIPPED | OUTPATIENT
Start: 2025-03-31

## 2025-03-31 RX ORDER — BLOOD-GLUCOSE METER
EACH MISCELLANEOUS
Qty: 1 KIT | Refills: 0 | Status: SHIPPED | OUTPATIENT
Start: 2025-03-31

## 2025-03-31 ASSESSMENT — ENCOUNTER SYMPTOMS: RESPIRATORY NEGATIVE: 1

## 2025-03-31 NOTE — PROGRESS NOTES
Bonny Jensen, was evaluated through a synchronous (real-time) audio-video encounter. The patient (or guardian if applicable) is aware that this is a billable service, which includes applicable co-pays. This Virtual Visit was conducted with patient's (and/or legal guardian's) consent. Patient identification was verified, and a caregiver was present when appropriate.   The patient was located at Home: [address]  [place] [address]  Provider was located at Facility (Appt Dept): [address] [facility] [address] [facility] [address]  [place] [address]  Confirm you are appropriately licensed, registered, or certified to deliver care in the state where the patient is located as indicated above. If you are not or unsure, please re-schedule the visit: Yes, I confirm.     Bonny Jensen (:  1955) is a Established patient, presenting virtually for evaluation of the following:      Below is the assessment and plan developed based on review of pertinent history, physical exam, labs, studies, and medications.     Assessment & Plan  Vertigo   New, not at goal (unstable), changes made today: per orders; she is to call if no improvement or worsening symptoms;     Orders:    meclizine (ANTIVERT) 12.5 MG tablet; Take 1 tablet by mouth 3 times daily as needed for Dizziness    Type 2 diabetes mellitus without complication, without long-term current use of insulin         Orders:    blood glucose monitor strips; Use to check blood glucose once daily.    Blood Glucose Monitoring Suppl ([employer] BLOOD GLUCOSE METER) w/Device KIT; Gold 365 Contour Next Blue Meter Kit, check blood sugars daily, DM2, E11.9    blood glucose monitor strips; Gold 365 Contour Next Blue Meter Kit test strips, check blood sugars daily, DM2, E11.9      Return if symptoms worsen or fail to improve.       Subjective   HPI c/o dizziness since last week; she stopped taking the Metformin; she is not checking blood sugars; new rx for glucometer

## 2025-04-01 DIAGNOSIS — E55.9 VITAMIN D DEFICIENCY: ICD-10-CM

## 2025-04-01 RX ORDER — ERGOCALCIFEROL 1.25 MG/1
50000 CAPSULE, LIQUID FILLED ORAL WEEKLY
Qty: 12 CAPSULE | Refills: 0 | Status: SHIPPED | OUTPATIENT
Start: 2025-04-01

## 2025-04-01 NOTE — TELEPHONE ENCOUNTER
Pt called stating that she pharmacy would not fill blood glucose monitoring supplies because insurance said they needed more information. Pt said pharmacy gave her a number to speak with insurance [phone] to see what they needed because pt was not sure.     Medication Refill Request    Name of Medication : Vitamin D    Strength of Medication: 1.25 mg    Directions: 1 capsule by mouth once a week    30 day or 90 day supply:     Preferred Pharmacy: [facility]    Last Appt. Date: 3/31/25    Next Appt. Date: 6/18/25    Additional Information For Provider:

## 2025-04-08 ENCOUNTER — TELEPHONE (OUTPATIENT)
Dept: INTERNAL MEDICINE CLINIC | Facility: CLINIC | Age: 70
End: 2025-04-08

## 2025-04-08 NOTE — TELEPHONE ENCOUNTER
Outcome  Additional Information Required  There is an existing case within the State mental health facility environment that has the same patient, prescriber, and drug. This case must be finalized before proceeding with similar requests. PA Case ID #: [id number]  Rx #: [id number]    It would not allow me to submit the Pa as it is already being done by someone else

## 2025-04-08 NOTE — TELEPHONE ENCOUNTER
Outcome  Additional Information Required  Authorization already on file for this request. Authorization starting on 01/01/2025 and ending on 12/31/2025.

## 2025-04-08 NOTE — TELEPHONE ENCOUNTER
Thanh smalls has been on Snoobe desk she took them home with her and brought the papers back today I will do the PA for her

## 2025-04-08 NOTE — TELEPHONE ENCOUNTER
Pt called requesting an update about her Glucose Meter. Stated the insurance should have contacted the office and that there needing prior auth.     The pt also stated she has gone with out a checking her sugar for a month now.    Please advice and update pt (requesting a call back)...

## 2025-04-09 ENCOUNTER — TELEPHONE (OUTPATIENT)
Dept: INTERNAL MEDICINE CLINIC | Facility: CLINIC | Age: 70
End: 2025-04-09

## 2025-04-09 DIAGNOSIS — E11.9 TYPE 2 DIABETES MELLITUS WITHOUT COMPLICATION, WITHOUT LONG-TERM CURRENT USE OF INSULIN: Primary | ICD-10-CM

## 2025-04-09 NOTE — TELEPHONE ENCOUNTER
Pts blood glucose test stripes have been approved on 04/08/2024        I have submitted the information for the blood glucose kit there were three started on this patient which is why it didn't go through because one of them didn't have the prescribers address and number.

## 2025-04-10 RX ORDER — CARVEDILOL 25 MG/1
1 TABLET, FILM COATED ORAL DAILY
Qty: 100 EACH | Refills: 3 | Status: SHIPPED | OUTPATIENT
Start: 2025-04-10

## 2025-04-10 NOTE — TELEPHONE ENCOUNTER
I am unclear as to what is needed at this point.   \"On day of next appointment, please screen for temperature and COVID-19 symptoms prior to you clinic appointment. If any symptoms present, please call [phone] to reschedule. \"

## 2025-04-14 ENCOUNTER — TELEPHONE (OUTPATIENT)
Dept: DIABETES SERVICES | Age: 70
End: 2025-04-14

## 2025-04-14 ENCOUNTER — HOSPITAL ENCOUNTER (OUTPATIENT)
Dept: DIABETES SERVICES | Age: 70
Discharge: HOME | End: 2025-04-17

## 2025-04-14 NOTE — TELEPHONE ENCOUNTER
Went to Virtual appointment and patient was logged in a few minutes before 9 AM. Could converse but difficult to hear her, and she was saying something about a phone call, and asking what education was for? Tried to go over the first session topics and let her know will call, so we can discuss as cannot hear her well.  I called and all three calls came back as patient is not available.  She logged out of virtual session. Will call later today to attempt to reschedule. [phone] or [phone].

## 2025-04-15 ENCOUNTER — TELEPHONE (OUTPATIENT)
Dept: INTERNAL MEDICINE CLINIC | Facility: CLINIC | Age: 70
End: 2025-04-15

## 2025-04-15 DIAGNOSIS — E11.9 TYPE 2 DIABETES MELLITUS WITHOUT COMPLICATION, WITHOUT LONG-TERM CURRENT USE OF INSULIN (HCC): ICD-10-CM

## 2025-04-15 NOTE — TELEPHONE ENCOUNTER
Kayode from [employer] called requesting an update on the orders that were faxed on 4/2/2025. Stated it was for a Prior Auth for Blood Glucose Monitoring Suppl ([employer] BLOOD GLUCOSE METER) w/Device KIT    Reference number: [id number]    Please advise...

## 2025-04-15 NOTE — TELEPHONE ENCOUNTER
Pt's insurance prefers Accu-Chek or True Metrix. Please send new RX's for glucometer and test strips for that meter to pt's [facility] pharmacy.

## 2025-04-17 RX ORDER — BLOOD-GLUCOSE METER
1 KIT MISCELLANEOUS DAILY
Qty: 1 KIT | Refills: 0 | Status: SHIPPED | OUTPATIENT
Start: 2025-04-17

## 2025-04-17 RX ORDER — AVOBENZONE, HOMOSALATE, OCTISALATE, OCTOCRYLENE 30; 40; 45; 26 MG/ML; MG/ML; MG/ML; MG/ML
CREAM TOPICAL
Qty: 100 EACH | Refills: 3 | Status: SHIPPED | OUTPATIENT
Start: 2025-04-17

## 2025-04-17 RX ORDER — GLUCOSAMINE HCL/CHONDROITIN SU 500-400 MG
CAPSULE ORAL
Qty: 100 STRIP | Refills: 3 | Status: SHIPPED | OUTPATIENT
Start: 2025-04-17

## 2025-05-01 ENCOUNTER — OFFICE VISIT (OUTPATIENT)
Dept: NEUROSURGERY | Age: 70
End: 2025-05-01
Payer: MEDICARE

## 2025-05-01 ENCOUNTER — HOSPITAL ENCOUNTER (OUTPATIENT)
Dept: GENERAL RADIOLOGY | Age: 70
Discharge: HOME | End: 2025-05-03
Payer: MEDICARE

## 2025-05-01 VITALS — HEIGHT: 63 IN | BODY MASS INDEX: 29.16 KG/M2

## 2025-05-01 DIAGNOSIS — Z98.890 S/P LUMBAR LAMINECTOMY: Primary | ICD-10-CM

## 2025-05-01 DIAGNOSIS — M48.062 SPINAL STENOSIS OF LUMBAR REGION WITH NEUROGENIC CLAUDICATION: ICD-10-CM

## 2025-05-01 DIAGNOSIS — M47.9 SPONDYLOSIS: ICD-10-CM

## 2025-05-01 PROCEDURE — 72120 X-RAY BEND ONLY L-S SPINE: CPT

## 2025-05-01 PROCEDURE — 1090F PRES/ABSN URINE INCON ASSESS: CPT | Performed by: STUDENT IN AN ORGANIZED HEALTH CARE EDUCATION/TRAINING PROGRAM

## 2025-05-01 PROCEDURE — G8427 DOCREV CUR MEDS BY ELIG CLIN: HCPCS | Performed by: STUDENT IN AN ORGANIZED HEALTH CARE EDUCATION/TRAINING PROGRAM

## 2025-05-01 PROCEDURE — 99214 OFFICE O/P EST MOD 30 MIN: CPT | Performed by: STUDENT IN AN ORGANIZED HEALTH CARE EDUCATION/TRAINING PROGRAM

## 2025-05-01 PROCEDURE — 1126F AMNT PAIN NOTED NONE PRSNT: CPT | Performed by: STUDENT IN AN ORGANIZED HEALTH CARE EDUCATION/TRAINING PROGRAM

## 2025-05-01 PROCEDURE — 4004F PT TOBACCO SCREEN RCVD TLK: CPT | Performed by: STUDENT IN AN ORGANIZED HEALTH CARE EDUCATION/TRAINING PROGRAM

## 2025-05-01 PROCEDURE — 3017F COLORECTAL CA SCREEN DOC REV: CPT | Performed by: STUDENT IN AN ORGANIZED HEALTH CARE EDUCATION/TRAINING PROGRAM

## 2025-05-01 PROCEDURE — 1159F MED LIST DOCD IN RCRD: CPT | Performed by: STUDENT IN AN ORGANIZED HEALTH CARE EDUCATION/TRAINING PROGRAM

## 2025-05-01 PROCEDURE — G8417 CALC BMI ABV UP PARAM F/U: HCPCS | Performed by: STUDENT IN AN ORGANIZED HEALTH CARE EDUCATION/TRAINING PROGRAM

## 2025-05-01 PROCEDURE — G8399 PT W/DXA RESULTS DOCUMENT: HCPCS | Performed by: STUDENT IN AN ORGANIZED HEALTH CARE EDUCATION/TRAINING PROGRAM

## 2025-05-01 PROCEDURE — 1123F ACP DISCUSS/DSCN MKR DOCD: CPT | Performed by: STUDENT IN AN ORGANIZED HEALTH CARE EDUCATION/TRAINING PROGRAM

## 2025-05-01 NOTE — PROGRESS NOTES
[facility]  Neurosurgery Clinic Note         Patient Name: Bonny Jensen    Medical Record Number: [id number]    YOB: 1955    Date of Visit: 05/01/25         Visit Type: New Patient         CHIEF COMPLAINT:  No chief complaint on file.      HISTORY OF PRESENT ILLNESS:    I had the pleasure of meeting Bonny Jensen in the neurosurgical clinic today. Bonny Jensen is a pleasant 69 y.o. year young female who presents for a 4-month postop visit after an L4-L5 laminectomy and decompression.  Patient reports postop she is doing well.  She still has some residual back pain and sometimes feels like there is something moving in her back otherwise she reports had a postoperative pain has significantly improved.  She acknowledges that she has not been able to walk as much as she should or wants to.  She has difficulty getting a ride to [place], however she is working on.          Physical Examination        Physical Exam            Lower Extremity Right Left   Illiopsoas (L2,3) 5 5   Quadriceps (L3,4) 5 5   Dorsiflexion (L4,5) 5 5   EHL (L5,S1) 5 5   Gastrocnemius (S1,2) 5 5     Reflex Level Grade   Knee jerk L3-4 R: 2+  L:2+   Ankle jerk S1 R: 2+  L:2+              Incision: Well-healed and intact         IMAGES: Flexion-extension x-ray did not show any major gross instability             Diagnosis Orders   1. S/P lumbar laminectomy        2. Spinal stenosis of lumbar region with neurogenic claudication        3. Spondylosis              ASSESSMENT and PLAN:    Bonny Jensen is a pleasant 69 y.o. year young female who presents for a 4-month postop visit after an L4-L5 laminectomy and decompression    Postoperatively patient is doing great.  Patient's postoperative pain seem to have improved.  Still encourage patient to continue walking as much as she can.  Patient is cleared from restrictions standpoint.  Patient can follow-up on an as-needed basis      Fito Maddox,

## 2025-06-17 ENCOUNTER — LAB (OUTPATIENT)
Dept: INTERNAL MEDICINE CLINIC | Facility: CLINIC | Age: 70
End: 2025-06-17

## 2025-06-17 DIAGNOSIS — E55.9 VITAMIN D DEFICIENCY: ICD-10-CM

## 2025-06-17 DIAGNOSIS — E11.9 TYPE 2 DIABETES MELLITUS WITHOUT COMPLICATION, WITHOUT LONG-TERM CURRENT USE OF INSULIN (HCC): ICD-10-CM

## 2025-06-17 LAB
25(OH)D3 SERPL-MCNC: 51.7 NG/ML (ref 30–100)
ANION GAP SERPL CALC-SCNC: 11 MMOL/L (ref 7–16)
BUN SERPL-MCNC: 17 MG/DL (ref 8–23)
CALCIUM SERPL-MCNC: 9.3 MG/DL (ref 8.8–10.2)
CHLORIDE SERPL-SCNC: 107 MMOL/L (ref 98–107)
CO2 SERPL-SCNC: 24 MMOL/L (ref 20–29)
CREAT SERPL-MCNC: 0.98 MG/DL (ref 0.6–1.1)
EST. AVERAGE GLUCOSE BLD GHB EST-MCNC: 150 MG/DL
GLUCOSE SERPL-MCNC: 137 MG/DL (ref 70–99)
HBA1C MFR BLD: 6.8 % (ref 0–5.6)
POTASSIUM SERPL-SCNC: 4.4 MMOL/L (ref 3.5–5.1)
SODIUM SERPL-SCNC: 142 MMOL/L (ref 136–145)

## 2025-06-25 ENCOUNTER — OFFICE VISIT (OUTPATIENT)
Dept: INTERNAL MEDICINE CLINIC | Facility: CLINIC | Age: 70
End: 2025-06-25
Payer: MEDICARE

## 2025-06-25 VITALS
DIASTOLIC BLOOD PRESSURE: 60 MMHG | HEART RATE: 68 BPM | SYSTOLIC BLOOD PRESSURE: 100 MMHG | TEMPERATURE: 97.2 F | BODY MASS INDEX: 28.63 KG/M2 | WEIGHT: 161.6 LBS | OXYGEN SATURATION: 96 %

## 2025-06-25 DIAGNOSIS — I10 PRIMARY HYPERTENSION: Primary | ICD-10-CM

## 2025-06-25 DIAGNOSIS — F41.9 ANXIETY AND DEPRESSION: ICD-10-CM

## 2025-06-25 DIAGNOSIS — F32.A ANXIETY AND DEPRESSION: ICD-10-CM

## 2025-06-25 DIAGNOSIS — H91.8X3 OTHER SPECIFIED HEARING LOSS OF BOTH EARS: ICD-10-CM

## 2025-06-25 DIAGNOSIS — E55.9 VITAMIN D DEFICIENCY: ICD-10-CM

## 2025-06-25 DIAGNOSIS — E11.9 TYPE 2 DIABETES MELLITUS WITHOUT COMPLICATION, WITHOUT LONG-TERM CURRENT USE OF INSULIN (HCC): ICD-10-CM

## 2025-06-25 DIAGNOSIS — K59.09 OTHER CONSTIPATION: ICD-10-CM

## 2025-06-25 DIAGNOSIS — M54.12 CERVICAL MYELOPATHY WITH CERVICAL RADICULOPATHY (HCC): ICD-10-CM

## 2025-06-25 DIAGNOSIS — J30.1 NON-SEASONAL ALLERGIC RHINITIS DUE TO POLLEN: ICD-10-CM

## 2025-06-25 DIAGNOSIS — Z12.31 VISIT FOR SCREENING MAMMOGRAM: ICD-10-CM

## 2025-06-25 DIAGNOSIS — K21.9 GASTROESOPHAGEAL REFLUX DISEASE WITHOUT ESOPHAGITIS: ICD-10-CM

## 2025-06-25 DIAGNOSIS — G40.909 SEIZURE DISORDER (HCC): ICD-10-CM

## 2025-06-25 DIAGNOSIS — M54.42 CHRONIC LEFT-SIDED LOW BACK PAIN WITH LEFT-SIDED SCIATICA: ICD-10-CM

## 2025-06-25 DIAGNOSIS — E78.00 HYPERCHOLESTEREMIA: ICD-10-CM

## 2025-06-25 DIAGNOSIS — G95.9 CERVICAL MYELOPATHY WITH CERVICAL RADICULOPATHY (HCC): ICD-10-CM

## 2025-06-25 DIAGNOSIS — Z12.11 SCREEN FOR COLON CANCER: ICD-10-CM

## 2025-06-25 DIAGNOSIS — F17.200 TOBACCO DEPENDENCE SYNDROME: ICD-10-CM

## 2025-06-25 DIAGNOSIS — Z87.891 PERSONAL HISTORY OF TOBACCO USE: ICD-10-CM

## 2025-06-25 DIAGNOSIS — J43.8 OTHER EMPHYSEMA (HCC): ICD-10-CM

## 2025-06-25 DIAGNOSIS — G89.29 CHRONIC LEFT-SIDED LOW BACK PAIN WITH LEFT-SIDED SCIATICA: ICD-10-CM

## 2025-06-25 DIAGNOSIS — M85.89 OSTEOPENIA OF MULTIPLE SITES: ICD-10-CM

## 2025-06-25 PROBLEM — R73.03 PREDIABETES: Status: RESOLVED | Noted: 2024-10-24 | Resolved: 2025-06-25

## 2025-06-25 PROCEDURE — G0296 VISIT TO DETERM LDCT ELIG: HCPCS | Performed by: INTERNAL MEDICINE

## 2025-06-25 PROCEDURE — 3017F COLORECTAL CA SCREEN DOC REV: CPT | Performed by: INTERNAL MEDICINE

## 2025-06-25 PROCEDURE — 3044F HG A1C LEVEL LT 7.0%: CPT | Performed by: INTERNAL MEDICINE

## 2025-06-25 PROCEDURE — 3023F SPIROM DOC REV: CPT | Performed by: INTERNAL MEDICINE

## 2025-06-25 PROCEDURE — 3078F DIAST BP <80 MM HG: CPT | Performed by: INTERNAL MEDICINE

## 2025-06-25 PROCEDURE — G8417 CALC BMI ABV UP PARAM F/U: HCPCS | Performed by: INTERNAL MEDICINE

## 2025-06-25 PROCEDURE — 1160F RVW MEDS BY RX/DR IN RCRD: CPT | Performed by: INTERNAL MEDICINE

## 2025-06-25 PROCEDURE — 1159F MED LIST DOCD IN RCRD: CPT | Performed by: INTERNAL MEDICINE

## 2025-06-25 PROCEDURE — 99214 OFFICE O/P EST MOD 30 MIN: CPT | Performed by: INTERNAL MEDICINE

## 2025-06-25 PROCEDURE — G8427 DOCREV CUR MEDS BY ELIG CLIN: HCPCS | Performed by: INTERNAL MEDICINE

## 2025-06-25 PROCEDURE — 1090F PRES/ABSN URINE INCON ASSESS: CPT | Performed by: INTERNAL MEDICINE

## 2025-06-25 PROCEDURE — 4004F PT TOBACCO SCREEN RCVD TLK: CPT | Performed by: INTERNAL MEDICINE

## 2025-06-25 PROCEDURE — 3074F SYST BP LT 130 MM HG: CPT | Performed by: INTERNAL MEDICINE

## 2025-06-25 PROCEDURE — G8399 PT W/DXA RESULTS DOCUMENT: HCPCS | Performed by: INTERNAL MEDICINE

## 2025-06-25 PROCEDURE — 2022F DILAT RTA XM EVC RTNOPTHY: CPT | Performed by: INTERNAL MEDICINE

## 2025-06-25 PROCEDURE — 1123F ACP DISCUSS/DSCN MKR DOCD: CPT | Performed by: INTERNAL MEDICINE

## 2025-06-25 RX ORDER — CARVEDILOL 12.5 MG/1
12.5 TABLET ORAL 2 TIMES DAILY
Qty: 180 TABLET | Refills: 1 | Status: SHIPPED | OUTPATIENT
Start: 2025-06-25

## 2025-06-25 RX ORDER — FLUTICASONE PROPIONATE AND SALMETEROL 250; 50 UG/1; UG/1
POWDER RESPIRATORY (INHALATION)
Qty: 1 EACH | Refills: 5 | Status: SHIPPED | OUTPATIENT
Start: 2025-06-25

## 2025-06-25 RX ORDER — AMLODIPINE BESYLATE 5 MG/1
5 TABLET ORAL DAILY
Qty: 90 TABLET | Refills: 3 | Status: SHIPPED | OUTPATIENT
Start: 2025-06-25

## 2025-06-25 RX ORDER — LACTULOSE 10 G/15ML
20 SOLUTION ORAL 3 TIMES DAILY
Qty: 300 ML | Refills: 1 | Status: CANCELLED | OUTPATIENT
Start: 2025-06-25

## 2025-06-25 RX ORDER — ATORVASTATIN CALCIUM 40 MG/1
40 TABLET, FILM COATED ORAL EVERY EVENING
Qty: 90 TABLET | Refills: 3 | Status: SHIPPED | OUTPATIENT
Start: 2025-06-25

## 2025-06-25 RX ORDER — OMEPRAZOLE 20 MG/1
20 CAPSULE, DELAYED RELEASE ORAL
Qty: 90 CAPSULE | Refills: 1 | Status: SHIPPED | OUTPATIENT
Start: 2025-06-25

## 2025-06-25 RX ORDER — METFORMIN HYDROCHLORIDE 500 MG/1
500 TABLET, EXTENDED RELEASE ORAL
Qty: 90 TABLET | Refills: 1 | Status: SHIPPED | OUTPATIENT
Start: 2025-06-25

## 2025-06-25 RX ORDER — FLUTICASONE PROPIONATE 50 MCG
1 SPRAY, SUSPENSION (ML) NASAL DAILY PRN
Qty: 16 G | Refills: 5 | Status: SHIPPED | OUTPATIENT
Start: 2025-06-25

## 2025-06-25 RX ORDER — SENNOSIDES 8.6 MG/1
1 TABLET ORAL DAILY
Qty: 30 TABLET | Refills: 0 | Status: SHIPPED | OUTPATIENT
Start: 2025-06-25 | End: 2025-07-25

## 2025-06-25 RX ORDER — GABAPENTIN 300 MG/1
300 CAPSULE ORAL 3 TIMES DAILY
Qty: 270 CAPSULE | Refills: 0 | Status: SHIPPED | OUTPATIENT
Start: 2025-06-25 | End: 2025-09-23

## 2025-06-25 RX ORDER — LISINOPRIL 40 MG/1
40 TABLET ORAL DAILY
Qty: 90 TABLET | Refills: 1 | Status: SHIPPED | OUTPATIENT
Start: 2025-06-25

## 2025-06-25 RX ORDER — AVOBENZONE, HOMOSALATE, OCTISALATE, OCTOCRYLENE 30; 40; 45; 26 MG/ML; MG/ML; MG/ML; MG/ML
CREAM TOPICAL
Qty: 100 EACH | Refills: 3 | Status: SHIPPED | OUTPATIENT
Start: 2025-06-25 | End: 2025-06-26 | Stop reason: SDUPTHER

## 2025-06-25 RX ORDER — FLUOXETINE HYDROCHLORIDE 40 MG/1
40 CAPSULE ORAL EVERY EVENING
Qty: 90 CAPSULE | Refills: 3 | Status: SHIPPED | OUTPATIENT
Start: 2025-06-25

## 2025-06-25 ASSESSMENT — PATIENT HEALTH QUESTIONNAIRE - PHQ9
7. TROUBLE CONCENTRATING ON THINGS, SUCH AS READING THE NEWSPAPER OR WATCHING TELEVISION: NOT AT ALL
3. TROUBLE FALLING OR STAYING ASLEEP: NEARLY EVERY DAY
5. POOR APPETITE OR OVEREATING: NOT AT ALL
SUM OF ALL RESPONSES TO PHQ QUESTIONS 1-9: 9
SUM OF ALL RESPONSES TO PHQ QUESTIONS 1-9: 9
10. IF YOU CHECKED OFF ANY PROBLEMS, HOW DIFFICULT HAVE THESE PROBLEMS MADE IT FOR YOU TO DO YOUR WORK, TAKE CARE OF THINGS AT HOME, OR GET ALONG WITH OTHER PEOPLE: SOMEWHAT DIFFICULT
4. FEELING TIRED OR HAVING LITTLE ENERGY: NEARLY EVERY DAY
SUM OF ALL RESPONSES TO PHQ QUESTIONS 1-9: 9
2. FEELING DOWN, DEPRESSED OR HOPELESS: SEVERAL DAYS
6. FEELING BAD ABOUT YOURSELF - OR THAT YOU ARE A FAILURE OR HAVE LET YOURSELF OR YOUR FAMILY DOWN: NOT AT ALL
8. MOVING OR SPEAKING SO SLOWLY THAT OTHER PEOPLE COULD HAVE NOTICED. OR THE OPPOSITE, BEING SO FIGETY OR RESTLESS THAT YOU HAVE BEEN MOVING AROUND A LOT MORE THAN USUAL: SEVERAL DAYS
SUM OF ALL RESPONSES TO PHQ QUESTIONS 1-9: 9
1. LITTLE INTEREST OR PLEASURE IN DOING THINGS: SEVERAL DAYS
9. THOUGHTS THAT YOU WOULD BE BETTER OFF DEAD, OR OF HURTING YOURSELF: NOT AT ALL

## 2025-06-25 ASSESSMENT — ENCOUNTER SYMPTOMS
COUGH: 0
NAUSEA: 0
DIARRHEA: 0
SINUS PRESSURE: 0
ABDOMINAL PAIN: 0
CHEST TIGHTNESS: 0
BLOOD IN STOOL: 0
RHINORRHEA: 0
SHORTNESS OF BREATH: 0
VOMITING: 0

## 2025-06-25 NOTE — PROGRESS NOTES
Take 1 tablet by mouth every evening, Disp: 90 tablet, Rfl: 3    carvedilol (COREG) 12.5 MG tablet, Take 1 tablet by mouth 2 times daily, Disp: 180 tablet, Rfl: 1    FLUoxetine (PROZAC) 40 MG capsule, Take 1 capsule by mouth every evening, Disp: 90 capsule, Rfl: 3    fluticasone (FLONASE) 50 MCG/ACT nasal spray, 1 spray by Each Nostril route daily as needed for Rhinitis, Disp: 16 g, Rfl: 5    fluticasone-salmeterol (ADVAIR DISKUS) 250-50 MCG/ACT AEPB diskus inhaler, Inhale 1 puff twice a day by inhalation route for 30 days., Disp: 1 each, Rfl: 5    gabapentin (NEURONTIN) 300 MG capsule, Take 1 capsule by mouth 3 times daily for 90 days. Intended supply: 30 days, Disp: 270 capsule, Rfl: 0    lisinopril (PRINIVIL;ZESTRIL) 40 MG tablet, Take 1 tablet by mouth daily, Disp: 90 tablet, Rfl: 1    metFORMIN (GLUCOPHAGE-XR) 500 MG extended release tablet, Take 1 tablet by mouth daily (with breakfast), Disp: 90 tablet, Rfl: 1    omeprazole (PRILOSEC) 20 MG delayed release capsule, Take 1 capsule by mouth every morning (before breakfast), Disp: 90 capsule, Rfl: 1    blood glucose test strips (CONTOUR TEST) strip, 1 each by In Vitro route daily As needed., Disp: 100 each, Rfl: 3    vitamin D (ERGOCALCIFEROL) 1.25 MG (29140 UT) CAPS capsule, Take 1 capsule by mouth once a week, Disp: 12 capsule, Rfl: 0    Blood Glucose Monitoring Suppl ([employer]S BLOOD GLUCOSE METER) w/Device KIT, Gold 365 Contour Next Blue Meter Kit, check blood sugars daily, DM2, E11.9, Disp: 1 kit, Rfl: 0    blood glucose monitor strips, Gold 365 Contour Next Blue Meter Kit test strips, check blood sugars daily, DM2, E11.9, Disp: 100 strip, Rfl: 3    methocarbamol (ROBAXIN) 750 MG tablet, Take 1 tablet by mouth 3 times daily as needed (back pain), Disp: 90 tablet, Rfl: 1    acetaminophen (TYLENOL) 500 MG tablet, Take 1 tablet by mouth every 6 hours as needed for Pain, Disp: , Rfl:     lactulose (CHRONULAC) 10 GM/15ML solution, Take 30 mLs by mouth 3 times daily,

## 2025-06-26 ENCOUNTER — TELEPHONE (OUTPATIENT)
Dept: INTERNAL MEDICINE CLINIC | Facility: CLINIC | Age: 70
End: 2025-06-26

## 2025-06-26 DIAGNOSIS — E11.9 TYPE 2 DIABETES MELLITUS WITHOUT COMPLICATION, WITHOUT LONG-TERM CURRENT USE OF INSULIN (HCC): ICD-10-CM

## 2025-06-26 RX ORDER — BLOOD-GLUCOSE METER
KIT MISCELLANEOUS
Qty: 1 KIT | Refills: 0 | Status: SHIPPED | OUTPATIENT
Start: 2025-06-26

## 2025-06-26 RX ORDER — GLUCOSAMINE HCL/CHONDROITIN SU 500-400 MG
CAPSULE ORAL
Qty: 100 STRIP | Refills: 3 | Status: SHIPPED | OUTPATIENT
Start: 2025-06-26

## 2025-06-26 RX ORDER — AVOBENZONE, HOMOSALATE, OCTISALATE, OCTOCRYLENE 30; 40; 45; 26 MG/ML; MG/ML; MG/ML; MG/ML
CREAM TOPICAL
Qty: 100 EACH | Refills: 3 | Status: SHIPPED | OUTPATIENT
Start: 2025-06-26

## 2025-06-26 NOTE — TELEPHONE ENCOUNTER
Called the patient about her previous message and she would like to know if Dr anne would like for her to take the senokot- I told her that it was sent in for her so I assumed Dr Anne wanted her to but she would like for you to clarify this please  Thank you, Lynn

## 2025-06-26 NOTE — TELEPHONE ENCOUNTER
Ascensia Contour Next Ez Blood Glucose Meter - she needs the lancets in this brand    This is the information that you needed from her visit yesterday

## 2025-06-27 NOTE — TELEPHONE ENCOUNTER
Spoke to patient and informed of message regarding Sennekot. Pt wants clarification regarding the Omega as to which one she needs to get also did you mention pt getting flaxseed and inquired about recommendations for something for mental clarity

## 2025-07-02 DIAGNOSIS — E11.9 TYPE 2 DIABETES MELLITUS WITHOUT COMPLICATION, WITHOUT LONG-TERM CURRENT USE OF INSULIN (HCC): ICD-10-CM

## 2025-07-03 ENCOUNTER — OFFICE VISIT (OUTPATIENT)
Dept: AUDIOLOGY | Age: 70
End: 2025-07-03
Payer: MEDICARE

## 2025-07-03 DIAGNOSIS — H90.3 SENSORINEURAL HEARING LOSS, BILATERAL: Primary | ICD-10-CM

## 2025-07-03 PROCEDURE — 92557 COMPREHENSIVE HEARING TEST: CPT | Performed by: AUDIOLOGIST

## 2025-07-03 PROCEDURE — 92567 TYMPANOMETRY: CPT | Performed by: AUDIOLOGIST

## 2025-07-03 RX ORDER — GLUCOSAMINE HCL/CHONDROITIN SU 500-400 MG
CAPSULE ORAL
Qty: 100 STRIP | Refills: 3 | Status: SHIPPED | OUTPATIENT
Start: 2025-07-03

## 2025-07-03 NOTE — PROGRESS NOTES
AUDIOLOGY EVALUATION    Bonny Jensen had Tympanometry and Audiometry performed today.    The patient reports hearing difficulties and intermittent sinus issues.    Results as follows:    Tympanometry    Type B -  bilaterally    Audiometry    Test Performed - Comprehensive Audiogram    Type of Loss - Right Ear: abnormal hearing: degree of loss is mild to severe sensorineural hearing loss                           Left Ear: abnormal hearing: degree of loss is mild to moderately severe sensorineural hearing loss     SRT   Measurement Right Ear Left Ear   Value 35 35   Unit dB dB     Discrimination  Measurement Right Ear Left Ear   Value 100% 100%   Unit dB dB     Recommend  Further testing due to asymmetry, per ENT  Binaural amplification following medical clearance  Retest per ENT    Ginette Alexis, CCC-A

## 2025-07-10 DIAGNOSIS — E11.9 TYPE 2 DIABETES MELLITUS WITHOUT COMPLICATION, WITHOUT LONG-TERM CURRENT USE OF INSULIN (HCC): ICD-10-CM

## 2025-07-10 RX ORDER — AVOBENZONE, HOMOSALATE, OCTISALATE, OCTOCRYLENE 30; 40; 45; 26 MG/ML; MG/ML; MG/ML; MG/ML
CREAM TOPICAL
Qty: 100 EACH | Refills: 3 | Status: SHIPPED | OUTPATIENT
Start: 2025-07-10

## 2025-07-18 ENCOUNTER — TELEMEDICINE (OUTPATIENT)
Dept: INTERNAL MEDICINE CLINIC | Facility: CLINIC | Age: 70
End: 2025-07-18

## 2025-07-18 DIAGNOSIS — E78.00 HYPERCHOLESTEREMIA: Primary | ICD-10-CM

## 2025-07-18 DIAGNOSIS — K59.09 OTHER CONSTIPATION: ICD-10-CM

## 2025-07-18 RX ORDER — NEOMYCIN/POLYMYXIN B/PRAMOXINE 3.5-10K-1
CREAM (GRAM) TOPICAL DAILY
COMMUNITY

## 2025-07-18 RX ORDER — LACTULOSE 10 G/15ML
20 SOLUTION ORAL DAILY PRN
Qty: 237 ML | Refills: 1 | Status: SHIPPED | OUTPATIENT
Start: 2025-07-18

## 2025-07-18 ASSESSMENT — ENCOUNTER SYMPTOMS
CONSTIPATION: 1
RESPIRATORY NEGATIVE: 1

## 2025-07-18 NOTE — ASSESSMENT & PLAN NOTE
Recommended low fat, low cholesterol diet, regular exercise.  Trial of Omega 3 supplement otc. She is to keep f/u in November with fasting labs as previously ordered.

## 2025-07-18 NOTE — PROGRESS NOTES
Bonny Jensen, was evaluated through a synchronous (real-time) audio-video encounter. The patient (or guardian if applicable) is aware that this is a billable service, which includes applicable co-pays. This Virtual Visit was conducted with patient's (and/or legal guardian's) consent. Patient identification was verified, and a caregiver was present when appropriate.   The patient was located at Home: [address]  [place] [address]  Provider was located at Facility (Appt Dept): [address]  [place] [address]  Confirm you are appropriately licensed, registered, or certified to deliver care in the state where the patient is located as indicated above. If you are not or unsure, please re-schedule the visit: Yes, I confirm.     Bonny Jensen (:  1955) is a Established patient, presenting virtually for evaluation of the following:      Below is the assessment and plan developed based on review of pertinent history, physical exam, labs, studies, and medications.     Assessment & Plan  Hypercholesteremia  Recommended low fat, low cholesterol diet, regular exercise.  Trial of Omega 3 supplement otc. She is to keep f/u in November with fasting labs as previously ordered.         Other constipation   Chronic, not at goal (unstable), continue current treatment plan; recommended increasing fiber in the diet, adequate hydration.    Orders:    lactulose (CHRONULAC) 10 GM/15ML solution; Take 30 mLs by mouth daily as needed (constipation)      Return if symptoms worsen or fail to improve, for keep f/u w/ labs in November.       Subjective   HPI She has hyperlipidemia, has questions about Omega 3; she recently bough Omega 3 gummies otc;   She also would like refills on Lactulose, which she has taken in the past for constipation;   Review of Systems   Constitutional: Negative.    HENT: Negative.     Respiratory: Negative.     Cardiovascular: Negative.

## 2025-07-24 ENCOUNTER — HOSPITAL ENCOUNTER (OUTPATIENT)
Dept: MAMMOGRAPHY | Age: 70
Discharge: HOME | End: 2025-07-27
Attending: INTERNAL MEDICINE
Payer: MEDICARE

## 2025-07-24 ENCOUNTER — HOSPITAL ENCOUNTER (OUTPATIENT)
Dept: CT IMAGING | Age: 70
Discharge: HOME | End: 2025-07-27
Attending: INTERNAL MEDICINE
Payer: MEDICARE

## 2025-07-24 DIAGNOSIS — Z87.891 PERSONAL HISTORY OF TOBACCO USE: ICD-10-CM

## 2025-07-24 DIAGNOSIS — Z12.31 VISIT FOR SCREENING MAMMOGRAM: ICD-10-CM

## 2025-07-24 DIAGNOSIS — F17.200 TOBACCO DEPENDENCE SYNDROME: ICD-10-CM

## 2025-07-24 PROCEDURE — 71271 CT THORAX LUNG CANCER SCR C-: CPT

## 2025-07-24 PROCEDURE — 77063 BREAST TOMOSYNTHESIS BI: CPT

## 2025-08-12 DIAGNOSIS — M47.9 SPONDYLOSIS: ICD-10-CM

## 2025-08-12 DIAGNOSIS — M48.062 NEUROGENIC CLAUDICATION DUE TO LUMBAR SPINAL STENOSIS: ICD-10-CM

## 2025-08-13 ENCOUNTER — OFFICE VISIT (OUTPATIENT)
Dept: ENT CLINIC | Age: 70
End: 2025-08-13
Payer: MEDICARE

## 2025-08-13 VITALS — BODY MASS INDEX: 28.34 KG/M2 | WEIGHT: 166 LBS | HEIGHT: 64 IN

## 2025-08-13 DIAGNOSIS — R09.82 PND (POST-NASAL DRIP): ICD-10-CM

## 2025-08-13 DIAGNOSIS — M27.0 TORUS PALATINUS: ICD-10-CM

## 2025-08-13 DIAGNOSIS — H90.3 SENSORINEURAL HEARING LOSS (SNHL) OF BOTH EARS: Primary | ICD-10-CM

## 2025-08-13 PROCEDURE — 1159F MED LIST DOCD IN RCRD: CPT | Performed by: OTOLARYNGOLOGY

## 2025-08-13 PROCEDURE — G8417 CALC BMI ABV UP PARAM F/U: HCPCS | Performed by: OTOLARYNGOLOGY

## 2025-08-13 PROCEDURE — G8399 PT W/DXA RESULTS DOCUMENT: HCPCS | Performed by: OTOLARYNGOLOGY

## 2025-08-13 PROCEDURE — 1126F AMNT PAIN NOTED NONE PRSNT: CPT | Performed by: OTOLARYNGOLOGY

## 2025-08-13 PROCEDURE — 3017F COLORECTAL CA SCREEN DOC REV: CPT | Performed by: OTOLARYNGOLOGY

## 2025-08-13 PROCEDURE — 1123F ACP DISCUSS/DSCN MKR DOCD: CPT | Performed by: OTOLARYNGOLOGY

## 2025-08-13 PROCEDURE — 1090F PRES/ABSN URINE INCON ASSESS: CPT | Performed by: OTOLARYNGOLOGY

## 2025-08-13 PROCEDURE — 4004F PT TOBACCO SCREEN RCVD TLK: CPT | Performed by: OTOLARYNGOLOGY

## 2025-08-13 PROCEDURE — G8427 DOCREV CUR MEDS BY ELIG CLIN: HCPCS | Performed by: OTOLARYNGOLOGY

## 2025-08-13 PROCEDURE — 99204 OFFICE O/P NEW MOD 45 MIN: CPT | Performed by: OTOLARYNGOLOGY

## 2025-08-13 RX ORDER — METHOCARBAMOL 750 MG/1
750 TABLET, FILM COATED ORAL 3 TIMES DAILY PRN
Qty: 90 TABLET | Refills: 1 | Status: SHIPPED | OUTPATIENT
Start: 2025-08-13

## 2025-08-13 ASSESSMENT — ENCOUNTER SYMPTOMS
EYES NEGATIVE: 1
RESPIRATORY NEGATIVE: 1
GASTROINTESTINAL NEGATIVE: 1
ALLERGIC/IMMUNOLOGIC NEGATIVE: 1

## 2025-08-18 ENCOUNTER — ANESTHESIA EVENT (OUTPATIENT)
Dept: ENDOSCOPY | Age: 70
End: 2025-08-18
Payer: MEDICARE

## 2025-08-18 ENCOUNTER — HOSPITAL ENCOUNTER (OUTPATIENT)
Age: 70
Setting detail: OUTPATIENT SURGERY
Discharge: HOME | End: 2025-08-18
Attending: INTERNAL MEDICINE | Admitting: INTERNAL MEDICINE
Payer: MEDICARE

## 2025-08-18 ENCOUNTER — ANESTHESIA (OUTPATIENT)
Dept: ENDOSCOPY | Age: 70
End: 2025-08-18
Payer: MEDICARE

## 2025-08-18 VITALS
RESPIRATION RATE: 23 BRPM | OXYGEN SATURATION: 98 % | SYSTOLIC BLOOD PRESSURE: 97 MMHG | HEIGHT: 64 IN | WEIGHT: 157.2 LBS | HEART RATE: 63 BPM | BODY MASS INDEX: 26.84 KG/M2 | DIASTOLIC BLOOD PRESSURE: 75 MMHG | TEMPERATURE: 97.6 F

## 2025-08-18 DIAGNOSIS — Z12.11 SCREEN FOR COLON CANCER: ICD-10-CM

## 2025-08-18 LAB
GLUCOSE BLD STRIP.AUTO-MCNC: 148 MG/DL (ref 65–100)
SERVICE CMNT-IMP: ABNORMAL

## 2025-08-18 PROCEDURE — 3609010300 HC COLONOSCOPY W/BIOPSY SINGLE/MULTIPLE: Performed by: INTERNAL MEDICINE

## 2025-08-18 PROCEDURE — 6360000002 HC RX W HCPCS: Performed by: NURSE ANESTHETIST, CERTIFIED REGISTERED

## 2025-08-18 PROCEDURE — 3700000001 HC ADD 15 MINUTES (ANESTHESIA): Performed by: INTERNAL MEDICINE

## 2025-08-18 PROCEDURE — 2580000003 HC RX 258: Performed by: ANESTHESIOLOGY

## 2025-08-18 PROCEDURE — 88305 TISSUE EXAM BY PATHOLOGIST: CPT

## 2025-08-18 PROCEDURE — 7100000011 HC PHASE II RECOVERY - ADDTL 15 MIN: Performed by: INTERNAL MEDICINE

## 2025-08-18 PROCEDURE — 82962 GLUCOSE BLOOD TEST: CPT

## 2025-08-18 PROCEDURE — 45380 COLONOSCOPY AND BIOPSY: CPT | Performed by: INTERNAL MEDICINE

## 2025-08-18 PROCEDURE — 7100000010 HC PHASE II RECOVERY - FIRST 15 MIN: Performed by: INTERNAL MEDICINE

## 2025-08-18 PROCEDURE — 3700000000 HC ANESTHESIA ATTENDED CARE: Performed by: INTERNAL MEDICINE

## 2025-08-18 PROCEDURE — 2709999900 HC NON-CHARGEABLE SUPPLY: Performed by: INTERNAL MEDICINE

## 2025-08-18 RX ORDER — IPRATROPIUM BROMIDE AND ALBUTEROL SULFATE 2.5; .5 MG/3ML; MG/3ML
1 SOLUTION RESPIRATORY (INHALATION)
Status: DISCONTINUED | OUTPATIENT
Start: 2025-08-18 | End: 2025-08-18 | Stop reason: HOSPADM

## 2025-08-18 RX ORDER — ONDANSETRON 2 MG/ML
4 INJECTION INTRAMUSCULAR; INTRAVENOUS
Status: DISCONTINUED | OUTPATIENT
Start: 2025-08-18 | End: 2025-08-18 | Stop reason: HOSPADM

## 2025-08-18 RX ORDER — SODIUM CHLORIDE, SODIUM LACTATE, POTASSIUM CHLORIDE, CALCIUM CHLORIDE 600; 310; 30; 20 MG/100ML; MG/100ML; MG/100ML; MG/100ML
INJECTION, SOLUTION INTRAVENOUS CONTINUOUS
Status: DISCONTINUED | OUTPATIENT
Start: 2025-08-18 | End: 2025-08-18 | Stop reason: HOSPADM

## 2025-08-18 RX ORDER — LIDOCAINE HYDROCHLORIDE 20 MG/ML
INJECTION, SOLUTION EPIDURAL; INFILTRATION; INTRACAUDAL; PERINEURAL
Status: DISCONTINUED | OUTPATIENT
Start: 2025-08-18 | End: 2025-08-18 | Stop reason: SDUPTHER

## 2025-08-18 RX ORDER — LIDOCAINE HYDROCHLORIDE 10 MG/ML
1 INJECTION, SOLUTION INFILTRATION; PERINEURAL
Status: DISCONTINUED | OUTPATIENT
Start: 2025-08-18 | End: 2025-08-18 | Stop reason: HOSPADM

## 2025-08-18 RX ORDER — SODIUM CHLORIDE 0.9 % (FLUSH) 0.9 %
5-40 SYRINGE (ML) INJECTION PRN
Status: DISCONTINUED | OUTPATIENT
Start: 2025-08-18 | End: 2025-08-18 | Stop reason: HOSPADM

## 2025-08-18 RX ORDER — HALOPERIDOL 5 MG/ML
1 INJECTION INTRAMUSCULAR
Status: DISCONTINUED | OUTPATIENT
Start: 2025-08-18 | End: 2025-08-18 | Stop reason: HOSPADM

## 2025-08-18 RX ORDER — IPRATROPIUM BROMIDE AND ALBUTEROL SULFATE 2.5; .5 MG/3ML; MG/3ML
1 SOLUTION RESPIRATORY (INHALATION)
Status: CANCELLED | OUTPATIENT
Start: 2025-08-18

## 2025-08-18 RX ORDER — PROPOFOL 10 MG/ML
INJECTION, EMULSION INTRAVENOUS
Status: DISCONTINUED | OUTPATIENT
Start: 2025-08-18 | End: 2025-08-18 | Stop reason: SDUPTHER

## 2025-08-18 RX ORDER — SODIUM CHLORIDE 0.9 % (FLUSH) 0.9 %
5-40 SYRINGE (ML) INJECTION EVERY 12 HOURS SCHEDULED
Status: DISCONTINUED | OUTPATIENT
Start: 2025-08-18 | End: 2025-08-18 | Stop reason: HOSPADM

## 2025-08-18 RX ADMIN — SODIUM CHLORIDE, SODIUM LACTATE, POTASSIUM CHLORIDE, AND CALCIUM CHLORIDE: 600; 310; 30; 20 INJECTION, SOLUTION INTRAVENOUS at 11:42

## 2025-08-18 RX ADMIN — LIDOCAINE HYDROCHLORIDE 50 MG: 20 INJECTION, SOLUTION EPIDURAL; INFILTRATION; INTRACAUDAL; PERINEURAL at 11:47

## 2025-08-18 RX ADMIN — PROPOFOL 150 MCG/KG/MIN: 10 INJECTION, EMULSION INTRAVENOUS at 11:48

## 2025-08-18 RX ADMIN — PROPOFOL 50 MG: 10 INJECTION, EMULSION INTRAVENOUS at 11:47

## 2025-08-18 ASSESSMENT — PAIN SCALES - GENERAL: PAINLEVEL_OUTOF10: 0

## 2025-08-26 DIAGNOSIS — E55.9 VITAMIN D DEFICIENCY: ICD-10-CM

## 2025-08-27 RX ORDER — ERGOCALCIFEROL 1.25 MG/1
50000 CAPSULE, LIQUID FILLED ORAL WEEKLY
Qty: 12 CAPSULE | Refills: 1 | Status: SHIPPED | OUTPATIENT
Start: 2025-08-27

## (undated) DEVICE — MANIFOLD SUCT SMK EVAC SGL PRT DISP NEPTUNE 2

## (undated) DEVICE — POSTERIOR LAMI VANPLT-LUCAS: Brand: MEDLINE INDUSTRIES, INC.

## (undated) DEVICE — DRAPE,UNDERBUTTOCKS,PCH,STERILE: Brand: MEDLINE

## (undated) DEVICE — SPONGE GZ W4XL4IN RAYON POLY CONSTRUCTED WV FINISHED EDGE

## (undated) DEVICE — SPONGE,NEURO,0.5"X1",XR,STRL,LF,10/PK: Brand: MEDLINE

## (undated) DEVICE — CONNECTOR TBNG OD5-7MM O2 END DISP

## (undated) DEVICE — SUTURE STRATAFIX SYMMETRIC SZ 1 L18IN ABSRB VLT CT1 L36CM SXPP1A404

## (undated) DEVICE — GARMENT,MEDLINE,DVT,INT,CALF,MED, GEN2: Brand: MEDLINE

## (undated) DEVICE — STERILE POLYISOPRENE POWDER-FREE SURGICAL GLOVES WITH EMOLLIENT COATING: Brand: PROTEXIS

## (undated) DEVICE — TIP SUCTION TRANSPAR RIB SURF STD BLB RIG  YANKAUER

## (undated) DEVICE — UNIVERSAL DRAPES: Brand: MEDLINE INDUSTRIES, INC.

## (undated) DEVICE — ABSORBENT, WATERPROOF, BACTERIA PROOF FILM DRESSING: Brand: OPSITE POST OP 20X10CM CTN 20

## (undated) DEVICE — MINOR LITHOTOMY PACK: Brand: MEDLINE INDUSTRIES, INC.

## (undated) DEVICE — DEVICE TISS REM L32CM DIA3MM S STL ULT HRD HI WR RESISTANCE

## (undated) DEVICE — TUBING, SUCTION, 1/4" X 10', STRAIGHT: Brand: MEDLINE

## (undated) DEVICE — SYRINGE MED 10ML LUERLOCK TIP W/O SFTY DISP

## (undated) DEVICE — 3M™ STERI-DRAPE™ INSTRUMENT POUCH 1018: Brand: STERI-DRAPE™

## (undated) DEVICE — ABSORBENT, WATERPROOF, BACTERIA PROOF FILM DRESSING: Brand: OPSITE POST OP 9.5X8.5CM CTN 20

## (undated) DEVICE — SUTURE MONOCRYL STRATAFIX SPRL + SZ 4-0 L12IN ABSRB UD PS-2 SXMP1B117

## (undated) DEVICE — SUTURE VICRYL + SZ 3-0 L18IN ABSRB UD SH 1/2 CIR TAPERCUT NDL VCP864D

## (undated) DEVICE — SOLUTION IRRIG 1000ML H2O PIC PLAS SHATTERPROOF CONTAINER

## (undated) DEVICE — AGENT HEMOSTATIC SURGIFLOW MATRIX KIT W/THROMBIN

## (undated) DEVICE — CONTAINER FORMALIN PREFILLED 10% NBF 60ML

## (undated) DEVICE — ADHESIVE SKIN CLOSURE WND 8.661X1.5 IN 22 CM LIQUIBAND SECUR

## (undated) DEVICE — FLUID MGMT SYS FLUENT KIT 6/PK

## (undated) DEVICE — ENDOSCOPIC KIT 1.1+ OP4 CA DE 2 GWN AAMI LEVEL 3

## (undated) DEVICE — SUTURE CHROMIC GUT SZ 3-0 L27IN ABSRB BRN L26MM SH 1/2 CIR G122H

## (undated) DEVICE — SHEET,DRAPE,53X77,STERILE: Brand: MEDLINE

## (undated) DEVICE — GLOVE SURG SZ 85 L12IN FNGR THK79MIL GRN LTX FREE

## (undated) DEVICE — ELECTRODE EKG 1 3/8X17/8IN SQ SHP AD FOAM BK SNAP CONN GEL

## (undated) DEVICE — TUBING O2 L7FT CRUSH RESIST

## (undated) DEVICE — NEEDLE SYRINGE 18GA L1.5IN RED PLAS HUB S STL BLNT FILL W/O

## (undated) DEVICE — Device

## (undated) DEVICE — GOWN,REINF,POLY,ECL,PP SLV,XL: Brand: MEDLINE

## (undated) DEVICE — SET ENDOSCP SEAL HYSTEROSCOPE RIG OUTFLO CHN DISP MYOSURE

## (undated) DEVICE — SUTURE ETHIBOND EXCEL SZ 0 L18IN NONABSORBABLE GRN L26MM MO-6 CX45D

## (undated) DEVICE — FORCEPS BX L240CM JAW DIA2.8MM L CAP W/ NDL MIC MESH TOOTH

## (undated) DEVICE — CARDINAL HEALTH FLEXIBLE LIGHT HANDLE COVER: Brand: CARDINAL HEALTH

## (undated) DEVICE — KIT ARMOR C DRP COLLAPSIBLE AND SELF EXP TOP CVR FOR FLUOROSCOPIC

## (undated) DEVICE — GLOVE SURG SZ 65 THK91MIL LTX FREE SYN POLYISOPRENE

## (undated) DEVICE — SOLUTION IRRIG 3000ML 0.9% SOD CHL USP UROMATIC PLAS CONT

## (undated) DEVICE — FORCEPS BPLR L210MM TIP L1.5 WRK L105MM STR BAYNT INSUL DISP

## (undated) DEVICE — BIPOLAR SEALER 23-112-1 AQM 6.0: Brand: AQUAMANTYS ®

## (undated) DEVICE — SUTURE VICRYL SZ 2-0 L18IN ABSRB UD CT-1 L36MM 1/2 CIR J839D

## (undated) DEVICE — SOLUTION IRRIG 1000ML 0.9% SOD CHL USP POUR PLAS BTL

## (undated) DEVICE — SYRINGE MEDICAL 3ML CLEAR PLASTIC STANDARD NON CONTROL LUERLOCK TIP DISPOSABLE